# Patient Record
Sex: MALE | Race: WHITE | Employment: OTHER | URBAN - METROPOLITAN AREA
[De-identification: names, ages, dates, MRNs, and addresses within clinical notes are randomized per-mention and may not be internally consistent; named-entity substitution may affect disease eponyms.]

---

## 2020-02-28 RX ORDER — GABAPENTIN 300 MG/1
900 CAPSULE ORAL 2 TIMES DAILY
COMMUNITY
Start: 2019-12-18 | End: 2020-05-07

## 2020-02-28 RX ORDER — GABAPENTIN 600 MG/1
600 TABLET ORAL 2 TIMES DAILY
COMMUNITY

## 2020-02-28 RX ORDER — GLIPIZIDE 5 MG/1
5 TABLET ORAL DAILY
COMMUNITY
Start: 2019-09-25

## 2020-02-28 RX ORDER — ENALAPRIL MALEATE 10 MG/1
10 TABLET ORAL DAILY
COMMUNITY
Start: 2019-09-25

## 2020-02-28 RX ORDER — ROSUVASTATIN CALCIUM 10 MG/1
10 TABLET, COATED ORAL DAILY
COMMUNITY
Start: 2019-09-25

## 2020-02-28 RX ORDER — AMOXICILLIN AND CLAVULANATE POTASSIUM 875; 125 MG/1; MG/1
1 TABLET, FILM COATED ORAL 2 TIMES DAILY
COMMUNITY
Start: 2020-02-12

## 2020-02-28 NOTE — PROGRESS NOTES
Patient instructed to:  Bring picture ID, insurance card,proof of address  Dress in comfortable,loose clothing,no jewelry, no make up/or finger polish/nocontact lenses dos if appplicable  ALL Angelita Apo on operative limb must be removed prior to DOS -if applicable  Nothing to eat or drink after midnight the night before surgery   If instructed to take medicines day of surgery by PCP, take only with sips of water  If you are taking insulin or diabetic medications check with your PCP to adjust doses according to your NPO (not eating) status  Arrange for transportation to and from surgery  With a caregiver staying with you for 24 hours  --make sure you are bring appropriate clothes to fit over large operative drsg - if applicable  Bring copies of H&P and or ekg dos     If your are taking NSAIDS/ASA products/vitamins regularly confirm this with your surgeon regarding taking them preop 5 days before surgery     Notify your Surgeon if you develop any illness between now and surgery time; cough, cold, fever, sore throat, nausea, vomiting, etc.

## 2020-02-28 NOTE — PROGRESS NOTES
Reyna Nicci    Age 59 y.o.    male    1955    [facility] [id number]    3/3/2020  Arrival Time_____________  OR Time____________[address]     Procedure(s):  AMPUTATION OF RIGHT GREAT TOE                      Monitor Anesthesia Care    Surgeon(s):  Keenan Laurent, DPM       Phone [phone] (home) [phone] (work)    [address]  Cell Work  _________________________________________________________________  _________________________________________________________________  _________________________________________________________________  _________________________________________________________________  _________________________________________________________________      PCP _____________________________ Phone_________________       H&P__________________Bringing    Chart            Epic  DOS     Called_______  EKG__________________Bringing    Chart            Epic  DOS     Called_______  LAB__________________ Bringing    Chart            Epic  DOS     Called_______  Cardiac Clearance_______Bringing    Chart            Epic      DOS       Called_______    Cardiologist________________________ Phone___________________________      ? Catholic concerns / Waiver on Chart            PAT Communications_____________  ? Pre-op Instructions Given [place]          ______________________________  ? Directions to Surgery Center                          ______________________________  ? Transportation Home_______________      _______________________________  ?  Crutches/Walker__________________        _______________________________      ________Pre-op Orders   _______Transcribed    _______Wt.  ________Pharmacy          _______SCD  ______VTE     ______Beta Blocker  ________Consent             ________TED Louanna Robert

## 2020-03-03 ENCOUNTER — ANESTHESIA EVENT (OUTPATIENT)
Dept: OPERATING ROOM | Age: 65
End: 2020-03-03
Payer: COMMERCIAL

## 2020-03-03 ENCOUNTER — HOSPITAL ENCOUNTER (OUTPATIENT)
Age: 65
Setting detail: OUTPATIENT SURGERY
Discharge: HOME | End: 2020-03-03
Attending: PODIATRIST | Admitting: PODIATRIST
Payer: COMMERCIAL

## 2020-03-03 ENCOUNTER — ANESTHESIA (OUTPATIENT)
Dept: OPERATING ROOM | Age: 65
End: 2020-03-03
Payer: COMMERCIAL

## 2020-03-03 VITALS
HEIGHT: 70 IN | WEIGHT: 209 LBS | OXYGEN SATURATION: 97 % | BODY MASS INDEX: 29.92 KG/M2 | HEART RATE: 60 BPM | DIASTOLIC BLOOD PRESSURE: 71 MMHG | SYSTOLIC BLOOD PRESSURE: 104 MMHG | TEMPERATURE: 97 F | RESPIRATION RATE: 15 BRPM

## 2020-03-03 VITALS — TEMPERATURE: 98.6 F | SYSTOLIC BLOOD PRESSURE: 92 MMHG | OXYGEN SATURATION: 96 % | DIASTOLIC BLOOD PRESSURE: 67 MMHG

## 2020-03-03 LAB
GLUCOSE BLD-MCNC: 164 MG/DL (ref 70–99)
GLUCOSE BLD-MCNC: 184 MG/DL (ref 70–99)
PERFORMED ON: ABNORMAL
PERFORMED ON: ABNORMAL

## 2020-03-03 PROCEDURE — 7100000011 HC PHASE II RECOVERY - ADDTL 15 MIN: Performed by: PODIATRIST

## 2020-03-03 PROCEDURE — 2580000003 HC RX 258: Performed by: ANESTHESIOLOGY

## 2020-03-03 PROCEDURE — 2500000003 HC RX 250 WO HCPCS: Performed by: PODIATRIST

## 2020-03-03 PROCEDURE — 3700000000 HC ANESTHESIA ATTENDED CARE: Performed by: PODIATRIST

## 2020-03-03 PROCEDURE — 2709999900 HC NON-CHARGEABLE SUPPLY: Performed by: PODIATRIST

## 2020-03-03 PROCEDURE — 6360000002 HC RX W HCPCS: Performed by: NURSE ANESTHETIST, CERTIFIED REGISTERED

## 2020-03-03 PROCEDURE — 3700000001 HC ADD 15 MINUTES (ANESTHESIA): Performed by: PODIATRIST

## 2020-03-03 PROCEDURE — 3600000003 HC SURGERY LEVEL 3 BASE: Performed by: PODIATRIST

## 2020-03-03 PROCEDURE — 3600000013 HC SURGERY LEVEL 3 ADDTL 15MIN: Performed by: PODIATRIST

## 2020-03-03 PROCEDURE — 7100000010 HC PHASE II RECOVERY - FIRST 15 MIN: Performed by: PODIATRIST

## 2020-03-03 PROCEDURE — 88311 DECALCIFY TISSUE: CPT

## 2020-03-03 PROCEDURE — 88305 TISSUE EXAM BY PATHOLOGIST: CPT

## 2020-03-03 PROCEDURE — 6360000002 HC RX W HCPCS: Performed by: PODIATRIST

## 2020-03-03 PROCEDURE — 2500000003 HC RX 250 WO HCPCS: Performed by: NURSE ANESTHETIST, CERTIFIED REGISTERED

## 2020-03-03 RX ORDER — ONDANSETRON 2 MG/ML
4 INJECTION INTRAMUSCULAR; INTRAVENOUS
Status: DISCONTINUED | OUTPATIENT
Start: 2020-03-03 | End: 2020-03-03 | Stop reason: HOSPADM

## 2020-03-03 RX ORDER — HYDRALAZINE HYDROCHLORIDE 20 MG/ML
5 INJECTION INTRAMUSCULAR; INTRAVENOUS EVERY 10 MIN PRN
Status: DISCONTINUED | OUTPATIENT
Start: 2020-03-03 | End: 2020-03-03 | Stop reason: HOSPADM

## 2020-03-03 RX ORDER — SODIUM CHLORIDE, SODIUM LACTATE, POTASSIUM CHLORIDE, CALCIUM CHLORIDE 600; 310; 30; 20 MG/100ML; MG/100ML; MG/100ML; MG/100ML
INJECTION, SOLUTION INTRAVENOUS CONTINUOUS
Status: DISCONTINUED | OUTPATIENT
Start: 2020-03-03 | End: 2020-03-03 | Stop reason: HOSPADM

## 2020-03-03 RX ORDER — LIDOCAINE HYDROCHLORIDE 20 MG/ML
INJECTION, SOLUTION INFILTRATION; PERINEURAL PRN
Status: DISCONTINUED | OUTPATIENT
Start: 2020-03-03 | End: 2020-03-03 | Stop reason: SDUPTHER

## 2020-03-03 RX ORDER — MORPHINE SULFATE 2 MG/ML
2 INJECTION, SOLUTION INTRAMUSCULAR; INTRAVENOUS EVERY 5 MIN PRN
Status: DISCONTINUED | OUTPATIENT
Start: 2020-03-03 | End: 2020-03-03 | Stop reason: HOSPADM

## 2020-03-03 RX ORDER — MEPERIDINE HYDROCHLORIDE 50 MG/ML
12.5 INJECTION INTRAMUSCULAR; INTRAVENOUS; SUBCUTANEOUS EVERY 5 MIN PRN
Status: DISCONTINUED | OUTPATIENT
Start: 2020-03-03 | End: 2020-03-03 | Stop reason: HOSPADM

## 2020-03-03 RX ORDER — PHENYLEPHRINE HCL IN 0.9% NACL 1 MG/10 ML
SYRINGE (ML) INTRAVENOUS PRN
Status: DISCONTINUED | OUTPATIENT
Start: 2020-03-03 | End: 2020-03-03 | Stop reason: SDUPTHER

## 2020-03-03 RX ORDER — SODIUM CHLORIDE 0.9 % (FLUSH) 0.9 %
10 SYRINGE (ML) INJECTION PRN
Status: DISCONTINUED | OUTPATIENT
Start: 2020-03-03 | End: 2020-03-03 | Stop reason: HOSPADM

## 2020-03-03 RX ORDER — PROMETHAZINE HYDROCHLORIDE 25 MG/ML
6.25 INJECTION, SOLUTION INTRAMUSCULAR; INTRAVENOUS
Status: DISCONTINUED | OUTPATIENT
Start: 2020-03-03 | End: 2020-03-03 | Stop reason: HOSPADM

## 2020-03-03 RX ORDER — MIDAZOLAM HYDROCHLORIDE 1 MG/ML
INJECTION INTRAMUSCULAR; INTRAVENOUS PRN
Status: DISCONTINUED | OUTPATIENT
Start: 2020-03-03 | End: 2020-03-03 | Stop reason: SDUPTHER

## 2020-03-03 RX ORDER — OXYCODONE HYDROCHLORIDE AND ACETAMINOPHEN 5; 325 MG/1; MG/1
1 TABLET ORAL EVERY 6 HOURS PRN
Qty: 20 TABLET | Refills: 0 | Status: SHIPPED | OUTPATIENT
Start: 2020-03-03 | End: 2020-03-08

## 2020-03-03 RX ORDER — DIPHENHYDRAMINE HYDROCHLORIDE 50 MG/ML
12.5 INJECTION INTRAMUSCULAR; INTRAVENOUS
Status: DISCONTINUED | OUTPATIENT
Start: 2020-03-03 | End: 2020-03-03 | Stop reason: HOSPADM

## 2020-03-03 RX ORDER — FENTANYL CITRATE 50 UG/ML
INJECTION, SOLUTION INTRAMUSCULAR; INTRAVENOUS PRN
Status: DISCONTINUED | OUTPATIENT
Start: 2020-03-03 | End: 2020-03-03 | Stop reason: SDUPTHER

## 2020-03-03 RX ORDER — OXYCODONE HYDROCHLORIDE AND ACETAMINOPHEN 5; 325 MG/1; MG/1
2 TABLET ORAL PRN
Status: DISCONTINUED | OUTPATIENT
Start: 2020-03-03 | End: 2020-03-03 | Stop reason: HOSPADM

## 2020-03-03 RX ORDER — GLYCOPYRROLATE 1 MG/5 ML
SYRINGE (ML) INTRAVENOUS PRN
Status: DISCONTINUED | OUTPATIENT
Start: 2020-03-03 | End: 2020-03-03 | Stop reason: SDUPTHER

## 2020-03-03 RX ORDER — PROPOFOL 10 MG/ML
INJECTION, EMULSION INTRAVENOUS PRN
Status: DISCONTINUED | OUTPATIENT
Start: 2020-03-03 | End: 2020-03-03 | Stop reason: SDUPTHER

## 2020-03-03 RX ORDER — ONDANSETRON 2 MG/ML
INJECTION INTRAMUSCULAR; INTRAVENOUS PRN
Status: DISCONTINUED | OUTPATIENT
Start: 2020-03-03 | End: 2020-03-03 | Stop reason: SDUPTHER

## 2020-03-03 RX ORDER — MORPHINE SULFATE 2 MG/ML
1 INJECTION, SOLUTION INTRAMUSCULAR; INTRAVENOUS EVERY 5 MIN PRN
Status: DISCONTINUED | OUTPATIENT
Start: 2020-03-03 | End: 2020-03-03 | Stop reason: HOSPADM

## 2020-03-03 RX ORDER — OXYCODONE HYDROCHLORIDE AND ACETAMINOPHEN 5; 325 MG/1; MG/1
1 TABLET ORAL PRN
Status: DISCONTINUED | OUTPATIENT
Start: 2020-03-03 | End: 2020-03-03 | Stop reason: HOSPADM

## 2020-03-03 RX ORDER — SODIUM CHLORIDE 0.9 % (FLUSH) 0.9 %
10 SYRINGE (ML) INJECTION EVERY 12 HOURS SCHEDULED
Status: DISCONTINUED | OUTPATIENT
Start: 2020-03-03 | End: 2020-03-03 | Stop reason: HOSPADM

## 2020-03-03 RX ORDER — LIDOCAINE HYDROCHLORIDE 10 MG/ML
0.3 INJECTION, SOLUTION EPIDURAL; INFILTRATION; INTRACAUDAL; PERINEURAL
Status: DISCONTINUED | OUTPATIENT
Start: 2020-03-03 | End: 2020-03-03 | Stop reason: HOSPADM

## 2020-03-03 RX ORDER — LABETALOL HYDROCHLORIDE 5 MG/ML
5 INJECTION, SOLUTION INTRAVENOUS EVERY 10 MIN PRN
Status: DISCONTINUED | OUTPATIENT
Start: 2020-03-03 | End: 2020-03-03 | Stop reason: HOSPADM

## 2020-03-03 RX ADMIN — Medication 100 MCG: at 10:38

## 2020-03-03 RX ADMIN — FENTANYL CITRATE 50 MCG: 50 INJECTION INTRAMUSCULAR; INTRAVENOUS at 10:26

## 2020-03-03 RX ADMIN — SODIUM CHLORIDE, POTASSIUM CHLORIDE, SODIUM LACTATE AND CALCIUM CHLORIDE: 600; 310; 30; 20 INJECTION, SOLUTION INTRAVENOUS at 09:23

## 2020-03-03 RX ADMIN — Medication 100 MCG: at 10:43

## 2020-03-03 RX ADMIN — PROPOFOL 300 MG: 10 INJECTION, EMULSION INTRAVENOUS at 10:26

## 2020-03-03 RX ADMIN — FENTANYL CITRATE 25 MCG: 50 INJECTION INTRAMUSCULAR; INTRAVENOUS at 10:31

## 2020-03-03 RX ADMIN — LIDOCAINE HYDROCHLORIDE 3 ML: 20 INJECTION, SOLUTION INFILTRATION; PERINEURAL at 10:26

## 2020-03-03 RX ADMIN — FENTANYL CITRATE 50 MCG: 50 INJECTION INTRAMUSCULAR; INTRAVENOUS at 10:23

## 2020-03-03 RX ADMIN — ONDANSETRON 4 MG: 2 INJECTION INTRAMUSCULAR; INTRAVENOUS at 10:35

## 2020-03-03 RX ADMIN — MIDAZOLAM HYDROCHLORIDE 2 MG: 2 INJECTION, SOLUTION INTRAMUSCULAR; INTRAVENOUS at 10:21

## 2020-03-03 RX ADMIN — CEFAZOLIN SODIUM 2 G: 10 INJECTION, POWDER, FOR SOLUTION INTRAVENOUS at 10:23

## 2020-03-03 RX ADMIN — FENTANYL CITRATE 25 MCG: 50 INJECTION INTRAMUSCULAR; INTRAVENOUS at 10:32

## 2020-03-03 RX ADMIN — FENTANYL CITRATE 50 MCG: 50 INJECTION INTRAMUSCULAR; INTRAVENOUS at 10:33

## 2020-03-03 RX ADMIN — Medication 0.2 MG: at 10:48

## 2020-03-03 ASSESSMENT — PAIN SCALES - GENERAL
PAINLEVEL_OUTOF10: 0

## 2020-03-03 ASSESSMENT — PULMONARY FUNCTION TESTS
PIF_VALUE: 1
PIF_VALUE: 1
PIF_VALUE: 0
PIF_VALUE: 1
PIF_VALUE: 2
PIF_VALUE: 1

## 2020-03-03 ASSESSMENT — PAIN - FUNCTIONAL ASSESSMENT: PAIN_FUNCTIONAL_ASSESSMENT: 0-10

## 2020-03-03 NOTE — ANESTHESIA PRE PROCEDURE
 sodium chloride flush 0.9 % injection 10 mL  10 mL Intravenous PRN Tadeo Pereira MD        lidocaine PF 1 % injection 0.3 mL  0.3 mL Intradermal Once PRN Tadeo Pereira MD           Allergies:  No Known Allergies    Problem List:  There is no problem list on file for this patient. Past Medical History:        Diagnosis Date    Diabetes mellitus ([address]     Hyperlipidemia     Hypertension        Past Surgical History:        Procedure Laterality Date    APPENDECTOMY      COLONOSCOPY      EYE SURGERY Left     lasik    FOOT SURGERY Right 2017    big toe       Social History:    Social History     Tobacco Use    Smoking status: Never Smoker    Smokeless tobacco: Never Used   Substance Use Topics    Alcohol use: Yes     Alcohol/week: 14.0 standard drinks     Types: 14 Glasses of wine per week     Comment: 2/day                                Counseling given: Not Answered      Vital Signs (Current):   Vitals:    02/28/20 1434 03/03/20 0900   BP:  (!) 106/58   Pulse:  84   Resp:  16   Temp:  98.4 °F (36.9 °C)   TempSrc:  Temporal   SpO2:  97%   Weight: 209 lb (94.8 kg) 209 lb (94.8 kg)   Height: 5' 10\" (1.778 m) 5' 10\" (1.778 m)                                              BP Readings from Last 3 Encounters:   03/03/20 (!) 106/58       NPO Status: Time of last liquid consumption: 2200                        Time of last solid consumption: 2200                        Date of last liquid consumption: 03/02/20                        Date of last solid food consumption: 03/02/20    BMI:   Wt Readings from Last 3 Encounters:   03/03/20 209 lb (94.8 kg)     Body mass index is 29.99 kg/m².     CBC: No results found for: WBC, RBC, HGB, HCT, MCV, RDW, PLT    CMP: No results found for: NA, K, CL, CO2, BUN, CREATININE, GFRAA, AGRATIO, LABGLOM, GLUCOSE, PROT, CALCIUM, BILITOT, ALKPHOS, AST, ALT    POC Tests:   Recent Labs     03/03/20  0916   POCGLU 164*       Coags: No results found for: PROTIME, INR, APTT    HCG

## 2020-03-03 NOTE — ANESTHESIA POSTPROCEDURE EVALUATION
Department of Anesthesiology  Postprocedure Note    Patient: Edmar Rojas  MRN: [id number]  YOB: 1955  Date of evaluation: 3/3/2020  Time:  11:46 AM     Procedure Summary     Date:  03/03/20 Room / Location:  [facility]  / [facility]    Anesthesia Start:  0108 Anesthesia Stop:  5531    Procedure:  AMPUTATION OF RIGHT GREAT TOE (Right Foot) Diagnosis:       Chronic ulcer of right great toe, with necrosis of muscle ([address]      (Chronic ulcer of right great toe, with necrosis of muscle ([address] [A77.326])    Surgeon:  Sanjana Townsend DPM Responsible Provider:  Shravan De La Rosa MD    Anesthesia Type:  MAC ASA Status:  3          Anesthesia Type: No value filed. Burke Phase I: Burke Score: 10    Burke Phase II: Burke Score: 10    Last vitals: Reviewed and per EMR flowsheets. Anesthesia Post Evaluation    Patient location during evaluation: PACU  Patient participation: complete - patient participated  Level of consciousness: awake and alert  Airway patency: patent  Nausea & Vomiting: no nausea and no vomiting  Complications: no  Cardiovascular status: blood pressure returned to baseline  Respiratory status: acceptable  Hydration status: euvolemic  Comments: VSS on transfer to phase 2 recovery. No anesthetic complications.

## 2020-03-03 NOTE — PROGRESS NOTES
Wife to bedside. Discharge instructions reviewed with patient and responsible adult. Discharge instructions signed and copy given with no additional questions. Patient to be discharged home with belongings. Percocet script given.

## 2020-03-03 NOTE — OP NOTE
[address]                                OPERATIVE REPORT    PATIENT NAME: Candi Chowdhury                 :        1955  MED REC NO:   [id number]                          ROOM:  ACCOUNT NO:   [de-identified]                           ADMIT DATE: 2020  PROVIDER:     Warren López DPM    DATE OF PROCEDURE:  2020    PREOPERATIVE DIAGNOSIS:  Osteomyelitis of the right hallux. POSTOPERATIVE DIAGNOSIS:  Osteomyelitis of the right hallux. PROCEDURE:  Amputation, right hallux. INDICATIONS FOR THE SURGERY:  The patient has been bothered by an ulcer  that was infected with Strep and it has improved with antibiotics, but  the bone was infected in the distal phalanx by plain films. Therefore,  he elected to proceed with surgical intervention. Est Blood Loss: minimal under 5cc    OPERATIVE FINDINGS:  The patient was brought into the operating room and  placed on the operating table in the supine position. Anesthesia was  accomplished by means of posterior tibial and local infiltration  utilizing 10 mL of 1:1 mixture of 0.5% Marcaine plain and 1% lidocaine  plain. The right foot was then prepped and draped in the usual sterile  manner. Foot was elevated and exsanguinated with an Esmarch. Ankle  tourniquet was inflated to 250 mmHg. Attention was directed to the  hallux, where there was a plantar ulceration. A fishmouth incision was  made plantarly proximal to that ulceration and then tapered down  dorsally. The toe was disarticulated at the head of the proximal  phalanx and the infected part of the toe was removed in toto. The head  of the proximal phalanx looked to be solid without any infectious  process in it, but it was cut back, because there was no _____ skin to  close the area approximately 1/3 of the length utilizing an oscillating  equipment.     The flexor and extensor tendons were

## 2020-03-03 NOTE — PROGRESS NOTES
POCT      Blood Glucose: 184    (Normal Range 70-99)  Patient will resume home medication at home. No complaints.

## 2020-04-02 NOTE — BRIEF OP NOTE
Brief Postoperative Note      Patient: Isaac Hernandez  YOB: 1955  MRN: [id number]    Date of Procedure: 3/3/2020    Pre-Op Diagnosis: Chronic ulcer of right great toe, with necrosis of muscle ([facility] [L97.513]    Post-Op Diagnosis: Same       Procedure(s):  AMPUTATION OF RIGHT GREAT TOE    Surgeon(s):  Katina Drake DPM    Assistant:  Surgical Assistant: Juan Prasad    Anesthesia: Monitor Anesthesia Care    Estimated Blood Loss (mL): less than 50     Complications: None    Specimens:   ID Type Source Tests Collected by Time Destination   A : right great toe Tissue Tissue SURGICAL PATHOLOGY Katina Drake DPM 3/3/2020 1042        Implants:  * No implants in log *      Drains: * No LDAs found *    Findings: consistent with dx    Electronically signed by Ktaina Drake DPM on 4/2/2020 at 9:26 AM

## 2022-08-11 LAB
BASOPHILS ABSOLUTE: 0 /ΜL
BASOPHILS RELATIVE PERCENT: 0.4 %
EOSINOPHILS ABSOLUTE: 0.1 /ΜL
EOSINOPHILS RELATIVE PERCENT: 1.3 %
HCT VFR BLD CALC: 40.9 % (ref 41–53)
HEMOGLOBIN: 13.6 G/DL (ref 13.5–17.5)
LYMPHOCYTES ABSOLUTE: 2.1 /ΜL
LYMPHOCYTES RELATIVE PERCENT: 24.3 %
MCH RBC QN AUTO: 31.1 PG
MCHC RBC AUTO-ENTMCNC: 33.3 G/DL
MCV RBC AUTO: 93.6 FL
MONOCYTES ABSOLUTE: 0.7 /ΜL
MONOCYTES RELATIVE PERCENT: 7.9 %
NEUTROPHILS ABSOLUTE: 5.6 /ΜL
NEUTROPHILS RELATIVE PERCENT: 65.9 %
PDW BLD-RTO: 12.8 %
PLATELET # BLD: 333 K/ΜL
PMV BLD AUTO: 10 FL
RBC # BLD: 4.37 10^6/ΜL
WBC # BLD: 8.5 10^3/ML

## 2022-08-23 ENCOUNTER — HOSPITAL ENCOUNTER (OUTPATIENT)
Dept: VASCULAR LAB | Age: 67
Discharge: HOME | End: 2022-08-23
Payer: MEDICARE

## 2022-08-23 DIAGNOSIS — E11.51 TYPE II DIABETES MELLITUS WITH PERIPHERAL CIRCULATORY DISORDER (HCC): ICD-10-CM

## 2022-08-23 PROCEDURE — 93925 LOWER EXTREMITY STUDY: CPT

## 2022-09-21 ENCOUNTER — HOSPITAL ENCOUNTER (OUTPATIENT)
Dept: MRI IMAGING | Age: 67
Discharge: HOME | End: 2022-09-21
Payer: MEDICARE

## 2022-09-21 DIAGNOSIS — L97.512 NON-PRESSURE CHRONIC ULCER OF OTHER PART OF RIGHT FOOT WITH FAT LAYER EXPOSED (HCC): ICD-10-CM

## 2022-09-21 PROCEDURE — 73718 MRI LOWER EXTREMITY W/O DYE: CPT

## 2023-02-02 LAB
CREATININE, URINE: 200
MICROALBUMIN/CREAT 24H UR: 30 MG/G{CREAT}
MICROALBUMIN/CREAT UR-RTO: <30

## 2023-04-04 LAB
ALBUMIN SERPL-MCNC: 4.1 G/DL
ALP BLD-CCNC: 126 U/L
ALT SERPL-CCNC: 7 U/L
ANION GAP SERPL CALCULATED.3IONS-SCNC: NORMAL MMOL/L
AST SERPL-CCNC: 11 U/L
BILIRUB SERPL-MCNC: 0.7 MG/DL (ref 0.1–1.4)
BUN BLDV-MCNC: 15 MG/DL
CALCIUM SERPL-MCNC: 9.4 MG/DL
CHLORIDE BLD-SCNC: 103 MMOL/L
CHOLESTEROL, TOTAL: 103 MG/DL
CHOLESTEROL/HDL RATIO: ABNORMAL
CO2: 21 MMOL/L
CREAT SERPL-MCNC: 0.99 MG/DL
EGFR: 83
GLUCOSE BLD-MCNC: 137 MG/DL
HDLC SERPL-MCNC: 34 MG/DL (ref 35–70)
LDL CHOLESTEROL CALCULATED: 49 MG/DL (ref 0–160)
NONHDLC SERPL-MCNC: ABNORMAL MG/DL
POTASSIUM SERPL-SCNC: 4.3 MMOL/L
SODIUM BLD-SCNC: 141 MMOL/L
TOTAL PROTEIN: 6.8
TRIGL SERPL-MCNC: 107 MG/DL
VLDLC SERPL CALC-MCNC: 20 MG/DL

## 2023-05-01 ENCOUNTER — OFFICE VISIT (OUTPATIENT)
Dept: INTERNAL MEDICINE CLINIC | Age: 68
End: 2023-05-01
Payer: MEDICARE

## 2023-05-01 VITALS
DIASTOLIC BLOOD PRESSURE: 74 MMHG | BODY MASS INDEX: 31.55 KG/M2 | SYSTOLIC BLOOD PRESSURE: 116 MMHG | OXYGEN SATURATION: 99 % | HEIGHT: 69 IN | HEART RATE: 90 BPM | WEIGHT: 213 LBS

## 2023-05-01 DIAGNOSIS — Z12.11 SCREEN FOR COLON CANCER: ICD-10-CM

## 2023-05-01 DIAGNOSIS — Z00.00 INITIAL MEDICARE ANNUAL WELLNESS VISIT: Primary | ICD-10-CM

## 2023-05-01 DIAGNOSIS — E11.43 TYPE 2 DIABETES MELLITUS WITH DIABETIC AUTONOMIC NEUROPATHY, WITHOUT LONG-TERM CURRENT USE OF INSULIN (HCC): ICD-10-CM

## 2023-05-01 DIAGNOSIS — E11.69 HYPERLIPIDEMIA ASSOCIATED WITH TYPE 2 DIABETES MELLITUS (HCC): ICD-10-CM

## 2023-05-01 DIAGNOSIS — S90.414S: ICD-10-CM

## 2023-05-01 DIAGNOSIS — E78.5 HYPERLIPIDEMIA ASSOCIATED WITH TYPE 2 DIABETES MELLITUS (HCC): ICD-10-CM

## 2023-05-01 DIAGNOSIS — G62.9 NEUROPATHY: ICD-10-CM

## 2023-05-01 DIAGNOSIS — I10 PRIMARY HYPERTENSION: ICD-10-CM

## 2023-05-01 DIAGNOSIS — L08.9: ICD-10-CM

## 2023-05-01 LAB — HBA1C MFR BLD: 7.6 %

## 2023-05-01 PROCEDURE — G8427 DOCREV CUR MEDS BY ELIG CLIN: HCPCS | Performed by: INTERNAL MEDICINE

## 2023-05-01 PROCEDURE — 3074F SYST BP LT 130 MM HG: CPT | Performed by: INTERNAL MEDICINE

## 2023-05-01 PROCEDURE — 3017F COLORECTAL CA SCREEN DOC REV: CPT | Performed by: INTERNAL MEDICINE

## 2023-05-01 PROCEDURE — 2022F DILAT RTA XM EVC RTNOPTHY: CPT | Performed by: INTERNAL MEDICINE

## 2023-05-01 PROCEDURE — G0438 PPPS, INITIAL VISIT: HCPCS | Performed by: INTERNAL MEDICINE

## 2023-05-01 PROCEDURE — G8417 CALC BMI ABV UP PARAM F/U: HCPCS | Performed by: INTERNAL MEDICINE

## 2023-05-01 PROCEDURE — 3078F DIAST BP <80 MM HG: CPT | Performed by: INTERNAL MEDICINE

## 2023-05-01 PROCEDURE — 83036 HEMOGLOBIN GLYCOSYLATED A1C: CPT | Performed by: INTERNAL MEDICINE

## 2023-05-01 PROCEDURE — 1036F TOBACCO NON-USER: CPT | Performed by: INTERNAL MEDICINE

## 2023-05-01 PROCEDURE — 1123F ACP DISCUSS/DSCN MKR DOCD: CPT | Performed by: INTERNAL MEDICINE

## 2023-05-01 PROCEDURE — 99204 OFFICE O/P NEW MOD 45 MIN: CPT | Performed by: INTERNAL MEDICINE

## 2023-05-01 PROCEDURE — 3051F HG A1C>EQUAL 7.0%<8.0%: CPT | Performed by: INTERNAL MEDICINE

## 2023-05-01 RX ORDER — ASPIRIN 81 MG/1
81 TABLET ORAL DAILY
Qty: 1 TABLET | Refills: 0 | COMMUNITY
Start: 2023-05-01

## 2023-05-01 RX ORDER — ENALAPRIL MALEATE 10 MG/1
10 TABLET ORAL 2 TIMES DAILY
Qty: 180 TABLET | Refills: 3 | Status: SHIPPED | OUTPATIENT
Start: 2023-05-01

## 2023-05-01 RX ORDER — GLIPIZIDE 10 MG/1
10 TABLET ORAL 2 TIMES DAILY
Qty: 180 TABLET | Refills: 3 | Status: SHIPPED | OUTPATIENT
Start: 2023-05-01

## 2023-05-01 RX ORDER — GABAPENTIN 800 MG/1
800 TABLET ORAL DAILY
Qty: 180 TABLET | Refills: 1 | Status: SHIPPED | OUTPATIENT
Start: 2023-05-01 | End: 2023-10-28

## 2023-05-01 RX ORDER — ROSUVASTATIN CALCIUM 10 MG/1
10 TABLET, COATED ORAL DAILY
Qty: 90 TABLET | Refills: 3 | Status: SHIPPED | OUTPATIENT
Start: 2023-05-01

## 2023-05-01 SDOH — ECONOMIC STABILITY: INCOME INSECURITY: HOW HARD IS IT FOR YOU TO PAY FOR THE VERY BASICS LIKE FOOD, HOUSING, MEDICAL CARE, AND HEATING?: NOT HARD AT ALL

## 2023-05-01 SDOH — ECONOMIC STABILITY: HOUSING INSECURITY
IN THE LAST 12 MONTHS, WAS THERE A TIME WHEN YOU DID NOT HAVE A STEADY PLACE TO SLEEP OR SLEPT IN A SHELTER (INCLUDING NOW)?: NO

## 2023-05-01 SDOH — ECONOMIC STABILITY: FOOD INSECURITY: WITHIN THE PAST 12 MONTHS, YOU WORRIED THAT YOUR FOOD WOULD RUN OUT BEFORE YOU GOT MONEY TO BUY MORE.: NEVER TRUE

## 2023-05-01 SDOH — ECONOMIC STABILITY: FOOD INSECURITY: WITHIN THE PAST 12 MONTHS, THE FOOD YOU BOUGHT JUST DIDN'T LAST AND YOU DIDN'T HAVE MONEY TO GET MORE.: NEVER TRUE

## 2023-05-01 ASSESSMENT — PATIENT HEALTH QUESTIONNAIRE - PHQ9
9. THOUGHTS THAT YOU WOULD BE BETTER OFF DEAD, OR OF HURTING YOURSELF: 0
SUM OF ALL RESPONSES TO PHQ9 QUESTIONS 1 & 2: 0
SUM OF ALL RESPONSES TO PHQ QUESTIONS 1-9: 0
4. FEELING TIRED OR HAVING LITTLE ENERGY: 0
SUM OF ALL RESPONSES TO PHQ QUESTIONS 1-9: 0
6. FEELING BAD ABOUT YOURSELF - OR THAT YOU ARE A FAILURE OR HAVE LET YOURSELF OR YOUR FAMILY DOWN: 0
SUM OF ALL RESPONSES TO PHQ QUESTIONS 1-9: 0
SUM OF ALL RESPONSES TO PHQ QUESTIONS 1-9: 0
8. MOVING OR SPEAKING SO SLOWLY THAT OTHER PEOPLE COULD HAVE NOTICED. OR THE OPPOSITE, BEING SO FIGETY OR RESTLESS THAT YOU HAVE BEEN MOVING AROUND A LOT MORE THAN USUAL: 0
2. FEELING DOWN, DEPRESSED OR HOPELESS: 0
1. LITTLE INTEREST OR PLEASURE IN DOING THINGS: 0
3. TROUBLE FALLING OR STAYING ASLEEP: 0
7. TROUBLE CONCENTRATING ON THINGS, SUCH AS READING THE NEWSPAPER OR WATCHING TELEVISION: 0
5. POOR APPETITE OR OVEREATING: 0

## 2023-05-01 ASSESSMENT — LIFESTYLE VARIABLES
HOW MANY STANDARD DRINKS CONTAINING ALCOHOL DO YOU HAVE ON A TYPICAL DAY: 1 OR 2
HOW OFTEN DO YOU HAVE A DRINK CONTAINING ALCOHOL: MONTHLY OR LESS

## 2023-05-01 NOTE — PATIENT INSTRUCTIONS
is done to find the right prescription for glasses and contact lenses. A color vision test is done to check for color blindness. Color vision is often tested as part of a routine exam. You may also have this test when you apply for a job where recognizing different colors is important, such as , electronics, or the [employer]. How are vision tests done? Visual acuity test   You cover one eye at a time. You read aloud from a wall chart across the room. You read aloud from a small card that you hold in your hand. Refraction   You look into a special device. The device puts lenses of different strengths in front of each eye to see how strong your glasses or contact lenses need to be. Visual field tests   Your doctor may have you look through special machines. Or your doctor may simply have you stare straight ahead while they move a finger into and out of your field of vision. Color vision test   You look at pieces of printed test patterns in various colors. You say what number or symbol you see. Your doctor may have you trace the number or symbol using a pointer. How do these tests feel? There is very little chance of having a problem from this test. If dilating drops are used for a vision test, they may make the eyes sting and cause a medicine taste in the mouth. Follow-up care is a key part of your treatment and safety. Be sure to make and go to all appointments, and call your doctor if you are having problems. It's also a good idea to know your test results and keep a list of the medicines you take. Where can you learn more? Go to http://www.ruiz.com/ and enter G551 to learn more about \"Learning About Vision Tests. \"  Current as of: October 12, 2022               Content Version: 13.6  © 0195-2889 Healthwise, Incorporated. Care instructions adapted under license by [facility] ([facility]).  If you have questions about a medical condition or this instruction, always ask your

## 2023-05-01 NOTE — PROGRESS NOTES
Yajaira Baca  YOB: 1955    Date of Service:  5/1/2023    Chief Complaint:      Chief Complaint   Patient presents with    New Patient    Medicare AWV    Diabetes    Hypertension    Cholesterol Problem    Medication Refill       Assessment/Plan:  Marianne Zepeda was seen today for new patient, medicare awv, diabetes, hypertension, cholesterol problem and medication refill. Diagnoses and all orders for this visit:    Initial Medicare annual wellness visit    Screen for colon cancer  -     AFL - Mily Wade MD, Gastroenterology (ERCP & EUS), [facility]    Type 2 diabetes mellitus with diabetic autonomic neuropathy, without long-term current use of insulin ([facility]  -     POCT glycosylated hemoglobin (Hb A1C)  Change to glipiZIDE (GLUCOTROL) 10 MG tablet; Take 1 tablet by mouth 2 times daily  -     metFORMIN (GLUCOPHAGE) 500 MG tablet; Take 2 tablets by mouth 2 times daily (with meals)  -     Diabetic Foot Exam    Primary hypertension  -     enalapril (VASOTEC) 10 MG tablet; Take 1 tablet by mouth 2 times daily    Neuropathy  Change to gabapentin (NEURONTIN) 800 MG tablet; Take 1 tablet by mouth daily for 180 days. Hyperlipidemia associated with type 2 diabetes mellitus (HCC)  -     rosuvastatin (CRESTOR) 10 MG tablet; Take 1 tablet by mouth daily    Infected abrasion of toe of right foot, sequela  Follow up with podiatrist    Return Diabetes, right big toe, BP, cholesterol 11/1. HPI:  Yajaira Baca is a 79 y.o. He cut his right big toe in Nov, was on Augmentin in Feb and have seen a podiatrist with multiple tests that came back ok.     Treatment Adherence:   Medication compliance:  compliant most of the time  Diet compliance:  compliant most of the time  Weight trend: stable  Current exercise: walks 5 time(s) per week  Barriers: none    Diabetes Mellitus Type 2: Current symptoms/problems include Neuropathy on Neurontin 300 mg 3 bid    Home blood sugar records: fasting range: 180 AM

## 2023-05-01 NOTE — PROGRESS NOTES
Medicare Annual Wellness Visit    Cyndee Mario is here for New Patient and Medicare Suellen Jaquez   Initial Medicare annual wellness visit      Recommendations for Preventive Services Due: see orders and patient instructions/AVS.  Recommended screening schedule for the next 5-10 years is provided to the patient in written form: see Patient Instructions/AVS.     No follow-ups on file. Subjective       Patient's complete Health Risk Assessment and screening values have been reviewed and are found in Flowsheets. The following problems were reviewed today and where indicated follow up appointments were made and/or referrals ordered. Positive Risk Factor Screenings with Interventions:               General HRA Questions:  Select all that apply: (!) New or Increased Pain (soreness in ankles from working)    Pain Interventions:  See above       Weight and Activity:  Physical Activity: Sufficiently Active    Days of Exercise per Week: 4 days    Minutes of Exercise per Session: 40 min     On average, how many days per week do you engage in moderate to strenuous exercise (like a brisk walk)?: 4 days  Have you lost any weight without trying in the past 3 months?: No  Body mass index is 31.45 kg/m². (!) Abnormal  Obesity Interventions:  Patient comments: only pain after walking for 2 miles          Dentist Screen:  Have you seen the dentist within the past year?: (!) No (needs to schedule)    Intervention:  Advised to schedule with their dentist     Vision Screen:  Do you have difficulty driving, watching TV, or doing any of your daily activities because of your eyesight?: (!) Yes (left eye is foggy)  Have you had an eye exam within the past year?: (!) No (needs to schedule)  No results found.     Interventions:   Patient encouraged to make appointment with their eye specialist    Safety:  Do you have either shower bars, grab bars, non-slip mats or non-slip surfaces in your shower or bathtub?: (!) No (pt

## 2023-11-01 ENCOUNTER — OFFICE VISIT (OUTPATIENT)
Dept: INTERNAL MEDICINE CLINIC | Age: 68
End: 2023-11-01
Payer: MEDICARE

## 2023-11-01 VITALS
WEIGHT: 210 LBS | DIASTOLIC BLOOD PRESSURE: 74 MMHG | HEART RATE: 70 BPM | HEIGHT: 69 IN | OXYGEN SATURATION: 99 % | BODY MASS INDEX: 31.1 KG/M2 | SYSTOLIC BLOOD PRESSURE: 128 MMHG

## 2023-11-01 DIAGNOSIS — M15.8 OTHER OSTEOARTHRITIS INVOLVING MULTIPLE JOINTS: ICD-10-CM

## 2023-11-01 DIAGNOSIS — E78.5 HYPERLIPIDEMIA ASSOCIATED WITH TYPE 2 DIABETES MELLITUS (HCC): ICD-10-CM

## 2023-11-01 DIAGNOSIS — G62.9 NEUROPATHY: ICD-10-CM

## 2023-11-01 DIAGNOSIS — M15.9 PRIMARY OSTEOARTHRITIS INVOLVING MULTIPLE JOINTS: ICD-10-CM

## 2023-11-01 DIAGNOSIS — E11.69 HYPERLIPIDEMIA ASSOCIATED WITH TYPE 2 DIABETES MELLITUS (HCC): ICD-10-CM

## 2023-11-01 DIAGNOSIS — E11.43 TYPE 2 DIABETES MELLITUS WITH DIABETIC AUTONOMIC NEUROPATHY, WITHOUT LONG-TERM CURRENT USE OF INSULIN (HCC): Primary | ICD-10-CM

## 2023-11-01 DIAGNOSIS — M06.9 RHEUMATOID ARTHRITIS INVOLVING MULTIPLE JOINTS (HCC): ICD-10-CM

## 2023-11-01 DIAGNOSIS — I10 PRIMARY HYPERTENSION: ICD-10-CM

## 2023-11-01 LAB — HBA1C MFR BLD: 7 %

## 2023-11-01 PROCEDURE — G8417 CALC BMI ABV UP PARAM F/U: HCPCS | Performed by: INTERNAL MEDICINE

## 2023-11-01 PROCEDURE — G8427 DOCREV CUR MEDS BY ELIG CLIN: HCPCS | Performed by: INTERNAL MEDICINE

## 2023-11-01 PROCEDURE — 3051F HG A1C>EQUAL 7.0%<8.0%: CPT | Performed by: INTERNAL MEDICINE

## 2023-11-01 PROCEDURE — 99214 OFFICE O/P EST MOD 30 MIN: CPT | Performed by: INTERNAL MEDICINE

## 2023-11-01 PROCEDURE — 3078F DIAST BP <80 MM HG: CPT | Performed by: INTERNAL MEDICINE

## 2023-11-01 PROCEDURE — 1123F ACP DISCUSS/DSCN MKR DOCD: CPT | Performed by: INTERNAL MEDICINE

## 2023-11-01 PROCEDURE — 3074F SYST BP LT 130 MM HG: CPT | Performed by: INTERNAL MEDICINE

## 2023-11-01 PROCEDURE — G8484 FLU IMMUNIZE NO ADMIN: HCPCS | Performed by: INTERNAL MEDICINE

## 2023-11-01 PROCEDURE — 83036 HEMOGLOBIN GLYCOSYLATED A1C: CPT | Performed by: INTERNAL MEDICINE

## 2023-11-01 PROCEDURE — 1036F TOBACCO NON-USER: CPT | Performed by: INTERNAL MEDICINE

## 2023-11-01 PROCEDURE — 3017F COLORECTAL CA SCREEN DOC REV: CPT | Performed by: INTERNAL MEDICINE

## 2023-11-01 PROCEDURE — 2022F DILAT RTA XM EVC RTNOPTHY: CPT | Performed by: INTERNAL MEDICINE

## 2023-11-01 RX ORDER — DICLOFENAC SODIUM 75 MG/1
75 TABLET, DELAYED RELEASE ORAL 2 TIMES DAILY WITH MEALS
Qty: 60 TABLET | Refills: 2 | Status: SHIPPED | OUTPATIENT
Start: 2023-11-01

## 2023-11-01 RX ORDER — GABAPENTIN 800 MG/1
800 TABLET ORAL DAILY
Qty: 90 TABLET | Refills: 1 | Status: SHIPPED | OUTPATIENT
Start: 2023-11-01 | End: 2024-04-29

## 2023-11-01 NOTE — PROGRESS NOTES
Krystian Calvillo  YOB: 1955    Date of Service:  11/1/2023    Chief Complaint:      Chief Complaint   Patient presents with    Diabetes    Toe Pain    Hypertension    Hyperlipidemia    Joint Pain     Thinks he may have arthritis. Has pain in hands, wrists, elbows, and shoulders. Assessment/Plan:  Corey Lott was seen today for diabetes, toe pain, hypertension, hyperlipidemia and joint pain. Diagnoses and all orders for this visit:    Type 2 diabetes mellitus with diabetic autonomic neuropathy, without long-term current use of insulin (HCC)  -     POCT glycosylated hemoglobin (Hb A1C)    Neuropathy  -     gabapentin (NEURONTIN) 800 MG tablet; Take 1 tablet by mouth daily for 180 days. Hyperlipidemia associated with type 2 diabetes mellitus ([address])    Primary hypertension    Primary osteoarthritis involving multiple joints    Other osteoarthritis involving multiple joints  Start diclofenac (VOLTAREN) 75 MG EC tablet; Take 1 tablet by mouth 2 times daily (with meals)  -     SARAH Miguel MD, Rheumatology, [facility]    Rheumatoid arthritis involving multiple joints ([address])  -     SARAH Miguel MD, Rheumatology, [facility]        Return Fasting AWV and f/u 5/1, arrive by 9:15 AM.      HPI:  Krystian Calvillo is a 76 y.o. Right big toe plantar area lesion is still open. No pain or discharge. He has not follow up with the podiatrist.    He complains of arthritis pain in his hands, wrist, elbow and shoulders.   He was on methotrexate per Dr. Savannah Mckenzie in the past?    Treatment Adherence:   Medication compliance:  compliant most of the time  Diet compliance:  compliant most of the time  Weight trend: stable  Current exercise: walks 5 time(s) per week  Barriers: none     Diabetes Mellitus Type 2: Current symptoms/problems include Neuropathy on Neurontin 800 mg qhs     Home blood sugar records: fasting range: 140-150 AM and 120-170 PM  Glipizide 10 mg 2 twice a day and Metformin 500

## 2023-12-26 ENCOUNTER — APPOINTMENT (OUTPATIENT)
Dept: GENERAL RADIOLOGY | Age: 68
End: 2023-12-26
Payer: MEDICARE

## 2023-12-26 ENCOUNTER — HOSPITAL ENCOUNTER (INPATIENT)
Age: 68
LOS: 10 days | Discharge: HOME HEALTH | End: 2024-01-06
Attending: EMERGENCY MEDICINE | Admitting: STUDENT IN AN ORGANIZED HEALTH CARE EDUCATION/TRAINING PROGRAM
Payer: MEDICARE

## 2023-12-26 DIAGNOSIS — A41.9 SEPTIC SHOCK (HCC): Primary | ICD-10-CM

## 2023-12-26 DIAGNOSIS — R65.21 SEPTIC SHOCK (HCC): Primary | ICD-10-CM

## 2023-12-26 DIAGNOSIS — I42.8 OTHER CARDIOMYOPATHY (HCC): ICD-10-CM

## 2023-12-26 DIAGNOSIS — M86.9 OSTEOMYELITIS OF RIGHT FOOT, UNSPECIFIED TYPE (HCC): ICD-10-CM

## 2023-12-26 DIAGNOSIS — I48.0 PAF (PAROXYSMAL ATRIAL FIBRILLATION) (HCC): ICD-10-CM

## 2023-12-26 LAB
ALBUMIN SERPL-MCNC: 3.4 G/DL (ref 3.4–5)
ALBUMIN/GLOB SERPL: 0.8 {RATIO} (ref 1.1–2.2)
ALP SERPL-CCNC: 124 U/L (ref 40–129)
ALT SERPL-CCNC: 18 U/L (ref 10–40)
ANION GAP SERPL CALCULATED.3IONS-SCNC: 20 MMOL/L (ref 3–16)
AST SERPL-CCNC: 22 U/L (ref 15–37)
BASE EXCESS BLDV CALC-SCNC: -8.2 MMOL/L (ref -3–3)
BASOPHILS # BLD: 0 K/UL (ref 0–0.2)
BASOPHILS NFR BLD: 0.3 %
BILIRUB SERPL-MCNC: 0.8 MG/DL (ref 0–1)
BUN SERPL-MCNC: 37 MG/DL (ref 7–20)
CALCIUM SERPL-MCNC: 9.3 MG/DL (ref 8.3–10.6)
CHLORIDE SERPL-SCNC: 96 MMOL/L (ref 99–110)
CO2 BLDV-SCNC: 18 MMOL/L
CO2 SERPL-SCNC: 16 MMOL/L (ref 21–32)
COHGB MFR BLDV: 3.5 % (ref 0–1.5)
CREAT SERPL-MCNC: 2.2 MG/DL (ref 0.8–1.3)
DEPRECATED RDW RBC AUTO: 12.9 % (ref 12.4–15.4)
EOSINOPHIL # BLD: 0 K/UL (ref 0–0.6)
EOSINOPHIL NFR BLD: 1.1 %
ERYTHROCYTE [SEDIMENTATION RATE] IN BLOOD BY WESTERGREN METHOD: 73 MM/HR (ref 0–20)
GFR SERPLBLD CREATININE-BSD FMLA CKD-EPI: 32 ML/MIN/{1.73_M2}
GLUCOSE BLD-MCNC: 163 MG/DL (ref 70–99)
GLUCOSE SERPL-MCNC: 153 MG/DL (ref 70–99)
HCO3 BLDV-SCNC: 16.9 MMOL/L (ref 23–29)
HCT VFR BLD AUTO: 32.9 % (ref 40.5–52.5)
HGB BLD-MCNC: 11.2 G/DL (ref 13.5–17.5)
LACTATE BLDV-SCNC: 5.6 MMOL/L (ref 0.4–2)
LYMPHOCYTES # BLD: 0.6 K/UL (ref 1–5.1)
LYMPHOCYTES NFR BLD: 14.5 %
MCH RBC QN AUTO: 29.9 PG (ref 26–34)
MCHC RBC AUTO-ENTMCNC: 34 G/DL (ref 31–36)
MCV RBC AUTO: 87.8 FL (ref 80–100)
METHGB MFR BLDV: 0.1 %
MONOCYTES # BLD: 0 K/UL (ref 0–1.3)
MONOCYTES NFR BLD: 0.4 %
NEUTROPHILS # BLD: 3.2 K/UL (ref 1.7–7.7)
NEUTROPHILS NFR BLD: 83.7 %
O2 THERAPY: ABNORMAL
PCO2 BLDV: 33.3 MMHG (ref 40–50)
PERFORMED ON: ABNORMAL
PH BLDV: 7.32 [PH] (ref 7.35–7.45)
PLATELET # BLD AUTO: 310 K/UL (ref 135–450)
PMV BLD AUTO: 7.7 FL (ref 5–10.5)
PO2 BLDV: 39.5 MMHG (ref 25–40)
POTASSIUM SERPL-SCNC: 4.6 MMOL/L (ref 3.5–5.1)
PROT SERPL-MCNC: 7.6 G/DL (ref 6.4–8.2)
RBC # BLD AUTO: 3.75 M/UL (ref 4.2–5.9)
SAO2 % BLDV: 71 %
SODIUM SERPL-SCNC: 132 MMOL/L (ref 136–145)
TROPONIN, HIGH SENSITIVITY: 59 NG/L (ref 0–22)
WBC # BLD AUTO: 3.9 K/UL (ref 4–11)

## 2023-12-26 PROCEDURE — 6360000002 HC RX W HCPCS: Performed by: EMERGENCY MEDICINE

## 2023-12-26 PROCEDURE — 87150 DNA/RNA AMPLIFIED PROBE: CPT

## 2023-12-26 PROCEDURE — 87076 CULTURE ANAEROBE IDENT EACH: CPT

## 2023-12-26 PROCEDURE — 82077 ASSAY SPEC XCP UR&BREATH IA: CPT

## 2023-12-26 PROCEDURE — 87186 SC STD MICRODIL/AGAR DIL: CPT

## 2023-12-26 PROCEDURE — 2580000003 HC RX 258: Performed by: EMERGENCY MEDICINE

## 2023-12-26 PROCEDURE — 73630 X-RAY EXAM OF FOOT: CPT

## 2023-12-26 PROCEDURE — 83605 ASSAY OF LACTIC ACID: CPT

## 2023-12-26 PROCEDURE — 87077 CULTURE AEROBIC IDENTIFY: CPT

## 2023-12-26 PROCEDURE — 87636 SARSCOV2 & INF A&B AMP PRB: CPT

## 2023-12-26 PROCEDURE — 85652 RBC SED RATE AUTOMATED: CPT

## 2023-12-26 PROCEDURE — 85025 COMPLETE CBC W/AUTO DIFF WBC: CPT

## 2023-12-26 PROCEDURE — 71045 X-RAY EXAM CHEST 1 VIEW: CPT

## 2023-12-26 PROCEDURE — 87040 BLOOD CULTURE FOR BACTERIA: CPT

## 2023-12-26 PROCEDURE — 96365 THER/PROPH/DIAG IV INF INIT: CPT

## 2023-12-26 PROCEDURE — 2580000003 HC RX 258: Performed by: NURSE PRACTITIONER

## 2023-12-26 PROCEDURE — 84484 ASSAY OF TROPONIN QUANT: CPT

## 2023-12-26 PROCEDURE — 93005 ELECTROCARDIOGRAM TRACING: CPT | Performed by: NURSE PRACTITIONER

## 2023-12-26 PROCEDURE — 99285 EMERGENCY DEPT VISIT HI MDM: CPT

## 2023-12-26 PROCEDURE — 6370000000 HC RX 637 (ALT 250 FOR IP): Performed by: NURSE PRACTITIONER

## 2023-12-26 PROCEDURE — 80053 COMPREHEN METABOLIC PANEL: CPT

## 2023-12-26 PROCEDURE — 82803 BLOOD GASES ANY COMBINATION: CPT

## 2023-12-26 RX ORDER — 0.9 % SODIUM CHLORIDE 0.9 %
30 INTRAVENOUS SOLUTION INTRAVENOUS ONCE
Status: COMPLETED | OUTPATIENT
Start: 2023-12-26 | End: 2023-12-27

## 2023-12-26 RX ORDER — ASPIRIN 325 MG
325 TABLET ORAL DAILY
COMMUNITY

## 2023-12-26 RX ORDER — ACETAMINOPHEN 500 MG
1000 TABLET ORAL ONCE
Status: COMPLETED | OUTPATIENT
Start: 2023-12-26 | End: 2023-12-26

## 2023-12-26 RX ADMIN — ACETAMINOPHEN 1000 MG: 500 TABLET ORAL at 23:45

## 2023-12-26 RX ADMIN — SODIUM CHLORIDE 2586 ML: 9 INJECTION, SOLUTION INTRAVENOUS at 23:32

## 2023-12-26 RX ADMIN — CEFEPIME 2000 MG: 2 INJECTION, POWDER, FOR SOLUTION INTRAVENOUS at 23:36

## 2023-12-26 ASSESSMENT — PAIN - FUNCTIONAL ASSESSMENT: PAIN_FUNCTIONAL_ASSESSMENT: NONE - DENIES PAIN

## 2023-12-27 ENCOUNTER — APPOINTMENT (OUTPATIENT)
Dept: MRI IMAGING | Age: 68
End: 2023-12-27
Payer: MEDICARE

## 2023-12-27 ENCOUNTER — APPOINTMENT (OUTPATIENT)
Dept: GENERAL RADIOLOGY | Age: 68
End: 2023-12-27
Payer: MEDICARE

## 2023-12-27 PROBLEM — E11.42 TYPE 2 DIABETES MELLITUS WITH DIABETIC POLYNEUROPATHY (HCC): Status: ACTIVE | Noted: 2023-05-01

## 2023-12-27 PROBLEM — R65.21 SEPTIC SHOCK (HCC): Status: ACTIVE | Noted: 2023-12-27

## 2023-12-27 PROBLEM — A41.9 SEPTIC SHOCK (HCC): Status: ACTIVE | Noted: 2023-12-27

## 2023-12-27 PROBLEM — L97.511 ULCER OF RIGHT FOOT, LIMITED TO BREAKDOWN OF SKIN (HCC): Status: ACTIVE | Noted: 2023-12-27

## 2023-12-27 LAB
AMORPH SED URNS QL MICRO: NORMAL /HPF
ANION GAP SERPL CALCULATED.3IONS-SCNC: 16 MMOL/L (ref 3–16)
ANION GAP SERPL CALCULATED.3IONS-SCNC: 16 MMOL/L (ref 3–16)
BASE EXCESS BLDV CALC-SCNC: -10 MMOL/L (ref -3–3)
BASOPHILS # BLD: 0.1 K/UL (ref 0–0.2)
BASOPHILS NFR BLD: 0.3 %
BILIRUB UR QL STRIP.AUTO: ABNORMAL
BUN SERPL-MCNC: 38 MG/DL (ref 7–20)
BUN SERPL-MCNC: 41 MG/DL (ref 7–20)
CALCIUM SERPL-MCNC: 7.6 MG/DL (ref 8.3–10.6)
CALCIUM SERPL-MCNC: 8.1 MG/DL (ref 8.3–10.6)
CHLORIDE SERPL-SCNC: 101 MMOL/L (ref 99–110)
CHLORIDE SERPL-SCNC: 97 MMOL/L (ref 99–110)
CLARITY UR: ABNORMAL
CO2 BLDV-SCNC: 17 MMOL/L
CO2 SERPL-SCNC: 14 MMOL/L (ref 21–32)
CO2 SERPL-SCNC: 15 MMOL/L (ref 21–32)
COLOR UR: YELLOW
CREAT SERPL-MCNC: 2.5 MG/DL (ref 0.8–1.3)
CREAT SERPL-MCNC: 2.6 MG/DL (ref 0.8–1.3)
DEPRECATED RDW RBC AUTO: 12.8 % (ref 12.4–15.4)
EKG ATRIAL RATE: 100 BPM
EKG ATRIAL RATE: 160 BPM
EKG DIAGNOSIS: NORMAL
EKG DIAGNOSIS: NORMAL
EKG P AXIS: 34 DEGREES
EKG P-R INTERVAL: 134 MS
EKG P-R INTERVAL: 144 MS
EKG Q-T INTERVAL: 302 MS
EKG Q-T INTERVAL: 338 MS
EKG QRS DURATION: 68 MS
EKG QRS DURATION: 78 MS
EKG QTC CALCULATION (BAZETT): 436 MS
EKG QTC CALCULATION (BAZETT): 492 MS
EKG R AXIS: 32 DEGREES
EKG R AXIS: 37 DEGREES
EKG T AXIS: 34 DEGREES
EKG T AXIS: 52 DEGREES
EKG VENTRICULAR RATE: 100 BPM
EKG VENTRICULAR RATE: 160 BPM
EOSINOPHIL # BLD: 0 K/UL (ref 0–0.6)
EOSINOPHIL NFR BLD: 0 %
EPI CELLS #/AREA URNS HPF: NORMAL /HPF (ref 0–5)
ETHANOLAMINE SERPL-MCNC: NORMAL MG/DL (ref 0–0.08)
FLUAV RNA RESP QL NAA+PROBE: NOT DETECTED
FLUBV RNA RESP QL NAA+PROBE: NOT DETECTED
GFR SERPLBLD CREATININE-BSD FMLA CKD-EPI: 26 ML/MIN/{1.73_M2}
GFR SERPLBLD CREATININE-BSD FMLA CKD-EPI: 27 ML/MIN/{1.73_M2}
GLUCOSE BLD-MCNC: 178 MG/DL (ref 70–99)
GLUCOSE BLD-MCNC: 247 MG/DL (ref 70–99)
GLUCOSE BLD-MCNC: 252 MG/DL (ref 70–99)
GLUCOSE SERPL-MCNC: 181 MG/DL (ref 70–99)
GLUCOSE SERPL-MCNC: 215 MG/DL (ref 70–99)
GLUCOSE UR STRIP.AUTO-MCNC: NEGATIVE MG/DL
HCO3 BLDV-SCNC: 16.1 MMOL/L (ref 23–29)
HCT VFR BLD AUTO: 27.8 % (ref 40.5–52.5)
HGB BLD-MCNC: 9.3 G/DL (ref 13.5–17.5)
HGB UR QL STRIP.AUTO: NEGATIVE
HYALINE CASTS #/AREA URNS LPF: NORMAL /LPF (ref 0–2)
KETONES UR STRIP.AUTO-MCNC: ABNORMAL MG/DL
LACTATE BLD-SCNC: 1.53 MMOL/L (ref 0.4–2)
LACTATE BLDV-SCNC: 2.1 MMOL/L (ref 0.4–1.9)
LACTATE BLDV-SCNC: 2.5 MMOL/L (ref 0.4–1.9)
LACTATE BLDV-SCNC: 2.8 MMOL/L (ref 0.4–2)
LEUKOCYTE ESTERASE UR QL STRIP.AUTO: NEGATIVE
LYMPHOCYTES # BLD: 0.4 K/UL (ref 1–5.1)
LYMPHOCYTES NFR BLD: 1.7 %
MCH RBC QN AUTO: 29.3 PG (ref 26–34)
MCHC RBC AUTO-ENTMCNC: 33.4 G/DL (ref 31–36)
MCV RBC AUTO: 87.8 FL (ref 80–100)
MONOCYTES # BLD: 0.9 K/UL (ref 0–1.3)
MONOCYTES NFR BLD: 3.7 %
NEUTROPHILS # BLD: 22.4 K/UL (ref 1.7–7.7)
NEUTROPHILS NFR BLD: 94.3 %
NITRITE UR QL STRIP.AUTO: NEGATIVE
PCO2 BLDV: 29.3 MM HG (ref 40–50)
PERFORMED ON: ABNORMAL
PH BLDV: 7.35 [PH] (ref 7.35–7.45)
PH UR STRIP.AUTO: 5.5 [PH] (ref 5–8)
PLATELET # BLD AUTO: 256 K/UL (ref 135–450)
PMV BLD AUTO: 7.7 FL (ref 5–10.5)
PO2 BLDV: 40 MM HG
POC SAMPLE TYPE: ABNORMAL
POTASSIUM SERPL-SCNC: 4.2 MMOL/L (ref 3.5–5.1)
POTASSIUM SERPL-SCNC: 4.6 MMOL/L (ref 3.5–5.1)
PROT UR STRIP.AUTO-MCNC: 30 MG/DL
RBC # BLD AUTO: 3.16 M/UL (ref 4.2–5.9)
RBC #/AREA URNS HPF: NORMAL /HPF (ref 0–4)
REPORT: NORMAL
SAO2 % BLDV: 73 %
SARS-COV-2 RNA RESP QL NAA+PROBE: NOT DETECTED
SODIUM SERPL-SCNC: 128 MMOL/L (ref 136–145)
SODIUM SERPL-SCNC: 131 MMOL/L (ref 136–145)
SP GR UR STRIP.AUTO: >=1.03 (ref 1–1.03)
TROPONIN, HIGH SENSITIVITY: 208 NG/L (ref 0–22)
TROPONIN, HIGH SENSITIVITY: 435 NG/L (ref 0–22)
UA COMPLETE W REFLEX CULTURE PNL UR: ABNORMAL
UA DIPSTICK W REFLEX MICRO PNL UR: YES
URN SPEC COLLECT METH UR: ABNORMAL
UROBILINOGEN UR STRIP-ACNC: 0.2 E.U./DL
VANCOMYCIN SERPL-MCNC: 13.2 UG/ML
WBC # BLD AUTO: 23.8 K/UL (ref 4–11)
WBC #/AREA URNS HPF: NORMAL /HPF (ref 0–5)

## 2023-12-27 PROCEDURE — 6360000002 HC RX W HCPCS: Performed by: INTERNAL MEDICINE

## 2023-12-27 PROCEDURE — 73720 MRI LWR EXTREMITY W/O&W/DYE: CPT

## 2023-12-27 PROCEDURE — 82728 ASSAY OF FERRITIN: CPT

## 2023-12-27 PROCEDURE — 2580000003 HC RX 258: Performed by: EMERGENCY MEDICINE

## 2023-12-27 PROCEDURE — 93306 TTE W/DOPPLER COMPLETE: CPT

## 2023-12-27 PROCEDURE — 51702 INSERT TEMP BLADDER CATH: CPT

## 2023-12-27 PROCEDURE — 82803 BLOOD GASES ANY COMBINATION: CPT

## 2023-12-27 PROCEDURE — 96361 HYDRATE IV INFUSION ADD-ON: CPT

## 2023-12-27 PROCEDURE — 2500000003 HC RX 250 WO HCPCS: Performed by: NURSE PRACTITIONER

## 2023-12-27 PROCEDURE — 96365 THER/PROPH/DIAG IV INF INIT: CPT

## 2023-12-27 PROCEDURE — 81001 URINALYSIS AUTO W/SCOPE: CPT

## 2023-12-27 PROCEDURE — 80048 BASIC METABOLIC PNL TOTAL CA: CPT

## 2023-12-27 PROCEDURE — 6370000000 HC RX 637 (ALT 250 FOR IP): Performed by: STUDENT IN AN ORGANIZED HEALTH CARE EDUCATION/TRAINING PROGRAM

## 2023-12-27 PROCEDURE — 96367 TX/PROPH/DG ADDL SEQ IV INF: CPT

## 2023-12-27 PROCEDURE — 2580000003 HC RX 258: Performed by: STUDENT IN AN ORGANIZED HEALTH CARE EDUCATION/TRAINING PROGRAM

## 2023-12-27 PROCEDURE — 99222 1ST HOSP IP/OBS MODERATE 55: CPT | Performed by: INTERNAL MEDICINE

## 2023-12-27 PROCEDURE — 6370000000 HC RX 637 (ALT 250 FOR IP): Performed by: INTERNAL MEDICINE

## 2023-12-27 PROCEDURE — 36592 COLLECT BLOOD FROM PICC: CPT

## 2023-12-27 PROCEDURE — 83605 ASSAY OF LACTIC ACID: CPT

## 2023-12-27 PROCEDURE — 93005 ELECTROCARDIOGRAM TRACING: CPT | Performed by: NURSE PRACTITIONER

## 2023-12-27 PROCEDURE — 96366 THER/PROPH/DIAG IV INF ADDON: CPT

## 2023-12-27 PROCEDURE — 99291 CRITICAL CARE FIRST HOUR: CPT | Performed by: INTERNAL MEDICINE

## 2023-12-27 PROCEDURE — 36415 COLL VENOUS BLD VENIPUNCTURE: CPT

## 2023-12-27 PROCEDURE — 6360000004 HC RX CONTRAST MEDICATION: Performed by: STUDENT IN AN ORGANIZED HEALTH CARE EDUCATION/TRAINING PROGRAM

## 2023-12-27 PROCEDURE — 36556 INSERT NON-TUNNEL CV CATH: CPT

## 2023-12-27 PROCEDURE — 6360000002 HC RX W HCPCS: Performed by: STUDENT IN AN ORGANIZED HEALTH CARE EDUCATION/TRAINING PROGRAM

## 2023-12-27 PROCEDURE — 2580000003 HC RX 258: Performed by: NURSE PRACTITIONER

## 2023-12-27 PROCEDURE — 71045 X-RAY EXAM CHEST 1 VIEW: CPT

## 2023-12-27 PROCEDURE — 2000000000 HC ICU R&B

## 2023-12-27 PROCEDURE — 2580000003 HC RX 258: Performed by: INTERNAL MEDICINE

## 2023-12-27 PROCEDURE — 83540 ASSAY OF IRON: CPT

## 2023-12-27 PROCEDURE — 87641 MR-STAPH DNA AMP PROBE: CPT

## 2023-12-27 PROCEDURE — 93010 ELECTROCARDIOGRAM REPORT: CPT | Performed by: INTERNAL MEDICINE

## 2023-12-27 PROCEDURE — 84484 ASSAY OF TROPONIN QUANT: CPT

## 2023-12-27 PROCEDURE — 83550 IRON BINDING TEST: CPT

## 2023-12-27 PROCEDURE — 51798 US URINE CAPACITY MEASURE: CPT

## 2023-12-27 PROCEDURE — 82746 ASSAY OF FOLIC ACID SERUM: CPT

## 2023-12-27 PROCEDURE — 82947 ASSAY GLUCOSE BLOOD QUANT: CPT

## 2023-12-27 PROCEDURE — 80202 ASSAY OF VANCOMYCIN: CPT

## 2023-12-27 PROCEDURE — 82607 VITAMIN B-12: CPT

## 2023-12-27 PROCEDURE — A9579 GAD-BASE MR CONTRAST NOS,1ML: HCPCS | Performed by: STUDENT IN AN ORGANIZED HEALTH CARE EDUCATION/TRAINING PROGRAM

## 2023-12-27 PROCEDURE — 6360000002 HC RX W HCPCS: Performed by: EMERGENCY MEDICINE

## 2023-12-27 PROCEDURE — 02HV33Z INSERTION OF INFUSION DEVICE INTO SUPERIOR VENA CAVA, PERCUTANEOUS APPROACH: ICD-10-PCS | Performed by: INTERNAL MEDICINE

## 2023-12-27 PROCEDURE — 85025 COMPLETE CBC W/AUTO DIFF WBC: CPT

## 2023-12-27 PROCEDURE — 2500000003 HC RX 250 WO HCPCS: Performed by: STUDENT IN AN ORGANIZED HEALTH CARE EDUCATION/TRAINING PROGRAM

## 2023-12-27 PROCEDURE — 3E033XZ INTRODUCTION OF VASOPRESSOR INTO PERIPHERAL VEIN, PERCUTANEOUS APPROACH: ICD-10-PCS | Performed by: INTERNAL MEDICINE

## 2023-12-27 RX ORDER — GABAPENTIN 300 MG/1
300 CAPSULE ORAL 3 TIMES DAILY
Status: DISCONTINUED | OUTPATIENT
Start: 2023-12-27 | End: 2024-01-06 | Stop reason: HOSPADM

## 2023-12-27 RX ORDER — ACETAMINOPHEN 325 MG/1
650 TABLET ORAL EVERY 6 HOURS PRN
Status: DISCONTINUED | OUTPATIENT
Start: 2023-12-27 | End: 2024-01-06 | Stop reason: HOSPADM

## 2023-12-27 RX ORDER — SODIUM CHLORIDE 0.9 % (FLUSH) 0.9 %
5-40 SYRINGE (ML) INJECTION PRN
Status: DISCONTINUED | OUTPATIENT
Start: 2023-12-27 | End: 2024-01-06 | Stop reason: HOSPADM

## 2023-12-27 RX ORDER — DEXTROSE MONOHYDRATE 100 MG/ML
INJECTION, SOLUTION INTRAVENOUS CONTINUOUS PRN
Status: DISCONTINUED | OUTPATIENT
Start: 2023-12-27 | End: 2024-01-06 | Stop reason: HOSPADM

## 2023-12-27 RX ORDER — INSULIN LISPRO 100 [IU]/ML
0-4 INJECTION, SOLUTION INTRAVENOUS; SUBCUTANEOUS NIGHTLY
Status: DISCONTINUED | OUTPATIENT
Start: 2023-12-27 | End: 2024-01-06 | Stop reason: HOSPADM

## 2023-12-27 RX ORDER — SODIUM CHLORIDE 9 MG/ML
INJECTION, SOLUTION INTRAVENOUS PRN
Status: DISCONTINUED | OUTPATIENT
Start: 2023-12-27 | End: 2024-01-06 | Stop reason: HOSPADM

## 2023-12-27 RX ORDER — SODIUM CHLORIDE, SODIUM LACTATE, POTASSIUM CHLORIDE, CALCIUM CHLORIDE 600; 310; 30; 20 MG/100ML; MG/100ML; MG/100ML; MG/100ML
INJECTION, SOLUTION INTRAVENOUS CONTINUOUS
Status: DISCONTINUED | OUTPATIENT
Start: 2023-12-27 | End: 2023-12-29

## 2023-12-27 RX ORDER — FAMOTIDINE 10 MG/ML
20 INJECTION, SOLUTION INTRAVENOUS DAILY
Status: COMPLETED | OUTPATIENT
Start: 2023-12-27 | End: 2023-12-28

## 2023-12-27 RX ORDER — SODIUM CHLORIDE, SODIUM LACTATE, POTASSIUM CHLORIDE, AND CALCIUM CHLORIDE .6; .31; .03; .02 G/100ML; G/100ML; G/100ML; G/100ML
1000 INJECTION, SOLUTION INTRAVENOUS ONCE
Status: COMPLETED | OUTPATIENT
Start: 2023-12-27 | End: 2023-12-27

## 2023-12-27 RX ORDER — INSULIN LISPRO 100 [IU]/ML
0-8 INJECTION, SOLUTION INTRAVENOUS; SUBCUTANEOUS
Status: DISCONTINUED | OUTPATIENT
Start: 2023-12-27 | End: 2024-01-06 | Stop reason: HOSPADM

## 2023-12-27 RX ORDER — INSULIN GLARGINE 100 [IU]/ML
0.15 INJECTION, SOLUTION SUBCUTANEOUS NIGHTLY
Status: DISCONTINUED | OUTPATIENT
Start: 2023-12-27 | End: 2024-01-06 | Stop reason: HOSPADM

## 2023-12-27 RX ORDER — ROSUVASTATIN CALCIUM 10 MG/1
10 TABLET, COATED ORAL DAILY
Status: DISCONTINUED | OUTPATIENT
Start: 2023-12-27 | End: 2024-01-06 | Stop reason: HOSPADM

## 2023-12-27 RX ORDER — ASPIRIN 81 MG/1
81 TABLET ORAL DAILY
Status: DISCONTINUED | OUTPATIENT
Start: 2023-12-27 | End: 2024-01-06 | Stop reason: HOSPADM

## 2023-12-27 RX ORDER — 0.9 % SODIUM CHLORIDE 0.9 %
500 INTRAVENOUS SOLUTION INTRAVENOUS ONCE
Status: COMPLETED | OUTPATIENT
Start: 2023-12-27 | End: 2023-12-27

## 2023-12-27 RX ORDER — GLUCAGON 1 MG/ML
1 KIT INJECTION PRN
Status: DISCONTINUED | OUTPATIENT
Start: 2023-12-27 | End: 2024-01-06 | Stop reason: HOSPADM

## 2023-12-27 RX ORDER — ENOXAPARIN SODIUM 100 MG/ML
40 INJECTION SUBCUTANEOUS DAILY
Status: DISCONTINUED | OUTPATIENT
Start: 2023-12-27 | End: 2024-01-06 | Stop reason: HOSPADM

## 2023-12-27 RX ORDER — SODIUM CHLORIDE 0.9 % (FLUSH) 0.9 %
5-40 SYRINGE (ML) INJECTION EVERY 12 HOURS SCHEDULED
Status: DISCONTINUED | OUTPATIENT
Start: 2023-12-27 | End: 2024-01-06 | Stop reason: HOSPADM

## 2023-12-27 RX ORDER — ACETAMINOPHEN 650 MG/1
650 SUPPOSITORY RECTAL EVERY 6 HOURS PRN
Status: DISCONTINUED | OUTPATIENT
Start: 2023-12-27 | End: 2024-01-06 | Stop reason: HOSPADM

## 2023-12-27 RX ORDER — INSULIN LISPRO 100 [IU]/ML
0.05 INJECTION, SOLUTION INTRAVENOUS; SUBCUTANEOUS
Status: DISCONTINUED | OUTPATIENT
Start: 2023-12-27 | End: 2024-01-06 | Stop reason: HOSPADM

## 2023-12-27 RX ADMIN — Medication 10 ML: at 11:13

## 2023-12-27 RX ADMIN — GABAPENTIN 300 MG: 300 CAPSULE ORAL at 19:50

## 2023-12-27 RX ADMIN — FAMOTIDINE 20 MG: 10 INJECTION, SOLUTION INTRAVENOUS at 10:58

## 2023-12-27 RX ADMIN — SODIUM CHLORIDE 4 MCG/MIN: 9 INJECTION, SOLUTION INTRAVENOUS at 01:26

## 2023-12-27 RX ADMIN — INSULIN LISPRO 4 UNITS: 100 INJECTION, SOLUTION INTRAVENOUS; SUBCUTANEOUS at 17:45

## 2023-12-27 RX ADMIN — SODIUM CHLORIDE, POTASSIUM CHLORIDE, SODIUM LACTATE AND CALCIUM CHLORIDE 1000 ML: 600; 310; 30; 20 INJECTION, SOLUTION INTRAVENOUS at 16:17

## 2023-12-27 RX ADMIN — ENOXAPARIN SODIUM 40 MG: 100 INJECTION SUBCUTANEOUS at 11:24

## 2023-12-27 RX ADMIN — MUPIROCIN: 20 OINTMENT TOPICAL at 11:00

## 2023-12-27 RX ADMIN — ROSUVASTATIN 10 MG: 10 TABLET, FILM COATED ORAL at 10:58

## 2023-12-27 RX ADMIN — INSULIN GLARGINE 13 UNITS: 100 INJECTION, SOLUTION SUBCUTANEOUS at 19:50

## 2023-12-27 RX ADMIN — INSULIN LISPRO 4 UNITS: 100 INJECTION, SOLUTION INTRAVENOUS; SUBCUTANEOUS at 12:25

## 2023-12-27 RX ADMIN — SODIUM CHLORIDE 500 ML: 9 INJECTION, SOLUTION INTRAVENOUS at 03:56

## 2023-12-27 RX ADMIN — GABAPENTIN 300 MG: 300 CAPSULE ORAL at 14:49

## 2023-12-27 RX ADMIN — GABAPENTIN 300 MG: 300 CAPSULE ORAL at 10:58

## 2023-12-27 RX ADMIN — ASPIRIN 81 MG: 81 TABLET, COATED ORAL at 10:58

## 2023-12-27 RX ADMIN — SODIUM CHLORIDE 20 MCG/MIN: 0.9 INJECTION, SOLUTION INTRAVENOUS at 03:34

## 2023-12-27 RX ADMIN — INSULIN LISPRO 2 UNITS: 100 INJECTION, SOLUTION INTRAVENOUS; SUBCUTANEOUS at 12:25

## 2023-12-27 RX ADMIN — SODIUM CHLORIDE: 9 INJECTION, SOLUTION INTRAVENOUS at 22:15

## 2023-12-27 RX ADMIN — SODIUM CHLORIDE, POTASSIUM CHLORIDE, SODIUM LACTATE AND CALCIUM CHLORIDE: 600; 310; 30; 20 INJECTION, SOLUTION INTRAVENOUS at 03:33

## 2023-12-27 RX ADMIN — MUPIROCIN: 20 OINTMENT TOPICAL at 19:35

## 2023-12-27 RX ADMIN — SODIUM CHLORIDE, POTASSIUM CHLORIDE, SODIUM LACTATE AND CALCIUM CHLORIDE: 600; 310; 30; 20 INJECTION, SOLUTION INTRAVENOUS at 17:48

## 2023-12-27 RX ADMIN — CEFEPIME 2000 MG: 2 INJECTION, POWDER, FOR SOLUTION INTRAVENOUS at 22:15

## 2023-12-27 RX ADMIN — VANCOMYCIN HYDROCHLORIDE 1000 MG: 1 INJECTION, POWDER, LYOPHILIZED, FOR SOLUTION INTRAVENOUS at 19:34

## 2023-12-27 RX ADMIN — CEFEPIME 2000 MG: 2 INJECTION, POWDER, FOR SOLUTION INTRAVENOUS at 11:24

## 2023-12-27 RX ADMIN — Medication 10 ML: at 19:34

## 2023-12-27 RX ADMIN — GADOTERIDOL 20 ML: 279.3 INJECTION, SOLUTION INTRAVENOUS at 15:48

## 2023-12-27 RX ADMIN — VANCOMYCIN HYDROCHLORIDE 2000 MG: 1 INJECTION, POWDER, LYOPHILIZED, FOR SOLUTION INTRAVENOUS at 00:18

## 2023-12-27 ASSESSMENT — PAIN SCALES - GENERAL: PAINLEVEL_OUTOF10: 0

## 2023-12-27 NOTE — CONSULTS
[facility] Nurse  Consult Note       NAME:  Ant Carrasquillo  MEDICAL RECORD NUMBER:  [id number]  AGE: 68 y.o.   GENDER: male  : 1955  TODAY'S DATE:  2023    Subjective; I am going to see Dr. Luong but got so sick couldn't  piece toilet paper   Reason for WOCN Evaluation and Assessment:        Diabetic wounds      Ant Carrasquillo is a 68 y.o. male referred by:   [] Physician  [x] Nursing  [] Other:     Wound Identification:  Wound Type: diabetic  Contributing Factors: diabetes    Wound History: 68 y.o. male who presented to [facility] with shaking.  PMHx significant for diabetes, hypertension, hyperlipidemia, diabetic foot ulcer, right foot toe amputation.    Current Wound Care Treatment:  MAI    Patient Goal of Care:  [x] Wound Healing  [] Odor Control  [] Palliative Care  [x] Pain Control   [] Other:         PAST MEDICAL HISTORY        Diagnosis Date    Diabetes mellitus (HCC)     Hyperlipidemia     Hypertension        PAST SURGICAL HISTORY    Past Surgical History:   Procedure Laterality Date    APPENDECTOMY      COLONOSCOPY      EYE SURGERY Left     lasik    FOOT SURGERY Right 2017    big toe    TOE AMPUTATION Right 3/3/2020    AMPUTATION OF RIGHT GREAT TOE performed by Candi Nixon DPM at [facility] OR       FAMILY HISTORY    Family History   Problem Relation Age of Onset    Other Mother         complication from hip surgery    Diabetes Father     No Known Problems Sister     No Known Problems Sister     No Known Problems Brother     No Known Problems Maternal Grandmother     No Known Problems Maternal Grandfather     No Known Problems Paternal Grandmother     No Known Problems Paternal Grandfather        SOCIAL HISTORY    Social History     Tobacco Use    Smoking status: Never    Smokeless tobacco: Never   Vaping Use    Vaping Use: Never used   Substance Use Topics    Alcohol use: Yes     Comment: ETOH today    Drug use: Never       ALLERGIES    No Known  12/27/23 1714   Dressing/Treatment Xeroform;Foam 12/27/23 1714   Offloading for Diabetic Foot Ulcers Offloading ordered 12/27/23 1714   Dressing Change Due 12/28/23 12/27/23 1714   Wound Length (cm) 0.3 cm 12/27/23 1714   Wound Width (cm) 0.3 cm 12/27/23 1714   Wound Depth (cm) 0.1 cm 12/27/23 1714   Wound Surface Area (cm^2) 0.09 cm^2 12/27/23 1714   Wound Volume (cm^3) 0.009 cm^3 12/27/23 1714   Distance Tunneling (cm) 0 cm 12/27/23 1714   Tunneling Position ___ O'Clock 0 12/27/23 1714   Undermining Starts ___ O'Clock 0 12/27/23 1714   Undermining Ends___ O'Clock 0 12/27/23 1714   Undermining Maxium Distance (cm) 0 12/27/23 1714   Wound Assessment Dry 12/27/23 1714   Drainage Amount None (dry) 12/27/23 1714   Odor None 12/27/23 1714   Christiana-wound Assessment Dry/flaky 12/27/23 1714   Margins Unattached edges 12/27/23 1714   Wound Thickness Description not for Pressure Injury Partial thickness 12/27/23 1714   Number of days: 0    Rt plantar     Wound 12/27/23 Toe (Comment  which one) Left;Medial medial hallux left (Active)   Wound Image   12/27/23 1714   Wound Etiology Diabetic Aquino 2 12/27/23 1714   Dressing Status New dressing applied 12/27/23 1714   Wound Cleansed Cleansed with saline 12/27/23 1714   Dressing/Treatment Xeroform;Gauze dressing/dressing sponge 12/27/23 1714   Offloading for Diabetic Foot Ulcers Offloading ordered 12/27/23 1714   Dressing Change Due 12/28/23 12/27/23 1714   Wound Length (cm) 0.2 cm 12/27/23 1714   Wound Width (cm) 1 cm 12/27/23 1714   Wound Depth (cm) 0.1 cm 12/27/23 1714   Wound Surface Area (cm^2) 0.2 cm^2 12/27/23 1714   Wound Volume (cm^3) 0.02 cm^3 12/27/23 1714   Distance Tunneling (cm) 0 cm 12/27/23 1714   Tunneling Position ___ O'Clock 0 12/27/23 1714   Undermining Starts ___ O'Clock 0 12/27/23 1714   Undermining Ends___ O'Clock 0 12/27/23 1714   Undermining Maxium Distance (cm) 0 12/27/23 1714   Wound Assessment Pink/red 12/27/23 1714   Drainage Amount None (dry) 12/27/23

## 2023-12-27 NOTE — PROGRESS NOTES
0322: Patient admitted to room 234 from emergency department. Levophed gtt infusing @20mcg/min via central line. Four eyed skin assessment completed, wound consulted per hospital policy for wounds present on admission. CHG bath completed, call light education provided and placed within reach.     Electronically signed by Nivia Haas RN on 12/27/2023 at 4:37 AM

## 2023-12-27 NOTE — PROGRESS NOTES
0500: patient's spouse notified of admission to ICU, all questions answered appropriately.     Electronically signed by Nivia Haas RN on 12/27/2023 at 5:32 AM

## 2023-12-27 NOTE — DISCHARGE INSTRUCTIONS
Wound Care;  Daily cleanse with normal saline bi lateral diabetic feet, pat dry.     Right mid plantar raised area with open dry area apply Xeroform cover with foam /     Right plantar near base of toes, dry area paint with Betadine swab.   Left large toe medial base posterior open area, apply Xeroform, dry gauze, soft tape.    [employer] MONITOR PATCH PATIENT INSTRUCTIONS    Remove Patch in 1 week from application.  Recalibrate and apply next patch.  Call  [employer] CUSTOMER SUPPORT: [phone] and they will assist if needed.    o PIN: [id number]    o Keep the phone with you as much as possible. Be sure to charge the phone overnight and when the battery gets low. If the phone dies completely, be sure to restart the phone and enter the PIN number to confirm the patch is connected to your phone.    o If you are away from the phone for more than 8 hours, please stay within a 30 foot range of the phone for 3 hours to ensure your data fully uploads.    o If you go somewhere with no cellphone service, still keep the phone with you and charged. Your data will upload when you reach cellphone service. You can stay outside of cellphone range for up to 10 days.    o This phone cannot make calls, take pictures, etc. It is solely for medical use and will only Bluetooth connect to your patch.    · Patch Instructions:    o Wait to exercise or shower for 30 minutes after application.    o Shower with the water stream to your back and avoid putting the patch directly under the water stream.    o Do not bathe, swim or fully submerge the patch.    o If the patch starts to lift off after showering or sweating, dry the patch with a towel and allow the adhesive to dry. It should re-adhere to your skin. If it continues to lift, use the adhesive overlay to re-adhere the patch. Video instructions for the adhesive overlay are on the phone under the Menu Tab in the right hand corner - “Help- [employer] Adhesive Overlay”    o One patch

## 2023-12-27 NOTE — CONSULTS
Infectious Diseases   Consult Note      Reason for Consult:  right foot infection, sepsis    Requesting Physician: Dr. Murguia     Date of Admission: 12/26/2023  Subjective:   CHIEF COMPLAINT: rigors     HPI:  68 y.o. male with hx of HTN, HLD, DM that presented on 12/27 for rigors and shaking chills. He has hx of diabetic foot ulcer and right hallux amputation in 3/2020. Denies any fevers, chest pain, shortness of breath, cough, nausea or diarrhea. Patient has a chronic ulcer on the bottom of the right foot that has been draining more recently. He states worsening in last week, does not always wear shoes in the house and does have neuropathy, denies any injury known to the area. The patient also has small wound on the area of right hallux amputation that he sustained about 1 yr ago when he crushed it on sliding door, states it bleeds occasionally. Has not followed with podiatrist but states made new appt for Jan 2024.   On presentation WBC was 3.9 initially that trended up to 28.8, he was noted to have temp of 101.9 and was admitted to the CVU.   He was started on vanco and cefepime. X ray right foot showed ulcer with no bony erosion. Mri is pending. ESR was 73.                      Current abx: vanco, cefepime          Past Surgical History:       Diagnosis Date    Diabetes mellitus (HCC)     Hyperlipidemia     Hypertension          Procedure Laterality Date    APPENDECTOMY      COLONOSCOPY      EYE SURGERY Left     lasik    FOOT SURGERY Right 2017    big toe    TOE AMPUTATION Right 3/3/2020    AMPUTATION OF RIGHT GREAT TOE performed by Candi Nixon DPM at [facility] ASC OR       Social History:    TOBACCO:   reports that he has never smoked. He has never used smokeless tobacco.  ETOH:   reports current alcohol use.  There is no history of illicit drug use or other significant epidemiologic exposures.    Family History:       Problem Relation Age of Onset    Other Mother         complication from hip surgery     white, conjunctive full.  OP with moist mucosal membranes, no thrush, tongue protrudes midline  NECK:  Supple, symmetrical, trachea midline, no adenopathy  LUNGS:  no increased work of breathing and clear to auscultation. No accessory muscle use  CARDIOVASCULAR: S1 and S2, no murmur  ABDOMEN:  normal bowel sounds, non-tender, non-distended, no hepatosplenomegaly  LYMPHADENOPATHY:  no axillary or supraclavicular adenopathy. No cervical adnenopathy  PSYCHIATRIC: Oriented to person place and time. No obvious depression or anxiety.  MUSCULOSKELETAL: slight edema right lower extremity, right foot big toe with amputation and ulceration on the plantar aspect in the middle of the foot with swelling, no drainage significant erythema or warmth   SKIN:  normal skin color, texture, turgor and no redness, warmth, or swelling. No palpable nodules or stigmata of embolic phenomenon  NEUROLOGIC: nonfocal exam  ACCESS: right IJ      DATA:    Old records have been reviewed    CBC:  Recent Labs     12/26/23 2300 12/27/23  0603   WBC 3.9* 23.8*   RBC 3.75* 3.16*   HGB 11.2* 9.3*   HCT 32.9* 27.8*    256   MCV 87.8 87.8   MCH 29.9 29.3   MCHC 34.0 33.4   RDW 12.9 12.8      BMP:  Recent Labs     12/26/23  2300 12/27/23  0603   * 131*   K 4.6 4.2   CL 96* 101   CO2 16* 14*   BUN 37* 38*   CREATININE 2.2* 2.5*   CALCIUM 9.3 8.1*   GLUCOSE 153* 181*        Cultures:   Bcx x 2 12/26: neg to date     Radiology Review:  All pertinent images / reports were reviewed as a part of this visit.     X ray right foot 12/27:  Ulcer/cellulitis along the plantar aspect of the mid foot however there is no adjacent bony erosion to suggest osteomyelitis.  Pes planus with postsurgical changes and extensive degenerative changes as above.    Portable chest X ray 12/27:  Placement of right IJ line with tip projecting over the SVC otherwise no  significant interval change.    Assessment:     Patient Active Problem List   Diagnosis    Type 2 diabetes

## 2023-12-27 NOTE — PLAN OF CARE
Hospital Medicine Plan of Care Note      Date of Admission: 12/26/2023  Hospital Day: 2    Chief Admission Complaint:  Rigors      Subjective:  no new c/o.     Presenting Admission History:         \"68 y.o. male who presented to [facility] with shaking.  PMHx significant for diabetes, hypertension, hyperlipidemia, diabetic foot ulcer, right foot toe amputation.  Patient states around 9 PM yesterday he started to develop significant shaking associated with nausea.  He states that shaking also bad that he was unable to do anything and decided to come to the ER.  Denies any fevers, chest pain, shortness of breath, cough, nausea or diarrhea, foot pain, presyncope or syncope.  Patient states that he has an ulcer on the bottom of his foot which has been draining recently.  He also had 1 episode of vomiting in the ER\"     Assessment/Plan:      Current Principal Problem:  Septic shock (HCC)      Septic shock likely secondary to diabetic foot infection with concern for osteomyelitis  Diabetic foot ulcer  Elevated troponin in the setting of septic shock  Diabetes  Hypertension  Hyperlipidemia  History of right foot toe amputation     Plan:  Received IV fluid boluses in ER and started on Levophed with central line placement in the ER  Improvement in lactic acidosis, will continue to trend lactic acid  Broad-spectrum antibiotics with vancomycin and cefepime, blood cultures pending  Will obtain MRI of the foot for better evaluation of his diabetic foot ulcer with suspected abscess and potentially osteomyelitis given elevated ESR  Insulin for glucose management  Hold home anti-hypertensives  Cardiology consulted from the ER for troponin, recommend trending troponin in the morning.    Physical Exam NOT necessarily Performed:        /62   Pulse 93   Temp 97.7 °F (36.5 °C) (Core)   Resp 20   Ht 1.778 m (5' 10\")   Wt 92.1 kg (203 lb 0.7 oz)   SpO2 98%   BMI 29.13 kg/m²     Diet: ADULT DIET; Regular; 4 carb choices

## 2023-12-27 NOTE — ED PROVIDER NOTES
Emergency Department Attending Provider Note  Location: [facility] C2 CARD TELEMETRY  12/26/2023     Patient Identification  Ant Carrasquillo is a 68 y.o. male      HPI:Ant Carrasquillo was evaluated in the Emergency Department for tremors.  Patient said that over the past few days he had not been feeling well but that a few hours ago he just began shaking uncontrollably.. Although initial history and physical exam information was obtained by LIANNA/NPP/MD/ (who also dictated a record of this visit), I personally saw the patient and performed a substantive portion of the visit including all aspects of the medical decision making.      PHYSICAL EXAM:  Nontoxic but ill-appearing adult male in no acute distress, he is tachycardic.  Abdomen is soft nondistended nontender no chest wall tenderness.  Normal lung and bowel sounds.  Patient has an ulcer with surrounding swelling to the right foot but it is nontender there is no active drainage no significant erythema of the foot no crepitus.    EKG Interpretation  Twelve-lead EKG as read and interpreted by myself shows normal sinus rhythm at a rate of 100 bpm, LA interval QRS and QTc normal.  Normal axis no acute ischemic findings.    Repeat twelve-lead EKG showed sinus tachycardia at a rate of 160 bpm, LA interval QRS and QTc normal.  No acute ischemic findings.    Patient seen and evaluated.  Relevant records reviewed.    MDM:  Adult male who comes in for tremors.  Infection was initially suspected with no identified source.  Did not seem that his foot was the main source.  Patient was afebrile.  Vital signs was initially stable.  Laboratory studies were obtained.  Patient while in the emergency room however developed tachycardia and was tremulous and febrile.  At that time IV fluids were started as well as antipyretic and IV antibiotics.  Diagnostic workup did not reveal any obvious sources of infection however patient is diabetic with a foot ulcer with swelling this is  likely the source.  This patient will require admission to the hospital.  Patient had several episodes of hypotension here in the emergency room even after fluid resuscitation.  This necessitated placement of a central line which was placed by LIANNA.  Patient is started on pressors.  He will be admitted to the ICU.  The hospitalist was notified by the LIANNA and EP we did admit this patient to his service.    CLINICAL IMPRESSION  1. Septic shock (HCC)            I personally saw the patient and independently provided 40 minutes of non-concurrent critical care out of the total shared critical care time provided.    This chart was generated in part by using Dragon Dictation system and may contain errors related to that system including errors in grammar, punctuation, and spelling, as well as words and phrases that may be inappropriate. If there are any questions or concerns please feel free to contact the dictating provider for clarification.     Carolin Clement MD  I am the primary clinician of record.    [employer]       Carolin Clement MD  12/27/23 0874

## 2023-12-27 NOTE — CONSULTS
vial 0-4 Units, Nightly  vancomycin (VANCOCIN) intermittent dosing (placeholder), RX Placeholder  mupirocin (BACTROBAN) 2 % ointment, BID  perflutren lipid microspheres (DEFINITY) injection 1.5 mL, ONCE PRN        Allergies:  Patient has no known allergies.     Review of Systems:   A 14 point review of symptoms completed. Pertinent positives identified in the HPI, all other review of symptoms negative as below.      Objective   PHYSICAL EXAM:    Vitals:    12/27/23 0800   BP: 109/62   Pulse: 93   Resp: 20   Temp: 97.7 °F (36.5 °C)   SpO2: 98%    Weight - Scale: 92.1 kg (203 lb 0.7 oz)         General Appearance:  Alert, cooperative, no distress, appears stated age, able to lie flat and speak in full sentences   Head:  Normocephalic, without obvious abnormality, atraumatic   Throat: Lips, mucosa, and tongue dry   Neck: Supple, no JVP, there is a central line in the right neck   Lungs:   Clear to auscultation bilaterally, respirations unlabored   Chest Wall:  No deformity or tenderness   Heart:  Regular rate and rhythm, S1, S2 normal, 2 out of 6 systolic murmur   Abdomen:   Soft, non-tender,   Extremities: Extremities with edema and wounds in bilateral feet   Pulses: 2+ and symmetric   Skin: As above   Pysch: Normal mood and affect   Neurologic: Generalized weakness        Labs   CBC:   Lab Results   Component Value Date/Time    WBC 23.8 12/27/2023 06:03 AM    RBC 3.16 12/27/2023 06:03 AM    HGB 9.3 12/27/2023 06:03 AM    HCT 27.8 12/27/2023 06:03 AM    MCV 87.8 12/27/2023 06:03 AM    RDW 12.8 12/27/2023 06:03 AM     12/27/2023 06:03 AM     CMP:  Lab Results   Component Value Date/Time     12/27/2023 06:03 AM    K 4.2 12/27/2023 06:03 AM     12/27/2023 06:03 AM    CO2 14 12/27/2023 06:03 AM    BUN 38 12/27/2023 06:03 AM    CREATININE 2.5 12/27/2023 06:03 AM    AGRATIO 0.8 12/26/2023 11:00 PM    LABGLOM 27 12/27/2023 06:03 AM    LABGLOM 83 04/04/2023 12:00 AM    GLUCOSE 181 12/27/2023 06:03 AM     CBCs.    Diabetes, sepsis, shock as per primary service.      Will also try to wean vasopressors.    Critical care [id number]    Thank you for allowing us to participate in the care of Ant Carrasquillo. Please call me with any questions [phone].    Ko Mckeon MD, [facility]   Interventional Cardiologist  [facility]  [phone] [place] Office  [phone] [place] Office  12/27/2023 9:15 AM

## 2023-12-27 NOTE — ED PROVIDER NOTES
[facility]  ED  EMERGENCY DEPARTMENT ENCOUNTER        Pt Name: Ant Carrasquillo  MRN: [id number]  Birthdate 1955  Date of evaluation: 12/26/2023  Provider: GILBERT Olguin - SHRUTHI  PCP: Minnie Coelho MD  Note Started: 2:17 AM EST 12/27/23       I have seen and evaluated this patient with my supervising physician Dr. Clement      CHIEF COMPLAINT       Chief Complaint   Patient presents with    Tremors     States \"I've just had the shakes for two hours.\" Denies any further s/s    Emesis       HISTORY OF PRESENT ILLNESS: 1 or more Elements     History From: the patient  Limitations to history : None    Ant Carrasquillo is a 68 y.o. male who presents to the emergency room today with complaints of \"tremors\".  Patient states that he is just been shaking for the last couple hours, denies any alcohol use, denied fevers states that he just was shaking uncontrollably.  Patient is a diabetic, he does state that he has an ulceration on his foot that has been there chronically.  He is here for further evaluation.    Nursing Notes were all reviewed and agreed with or any disagreements were addressed in the HPI.    REVIEW OF SYSTEMS :      Review of Systems    Positives and Pertinent negatives as per HPI.     SURGICAL HISTORY     Past Surgical History:   Procedure Laterality Date    APPENDECTOMY      COLONOSCOPY      EYE SURGERY Left     lasik    FOOT SURGERY Right 2017    big toe    TOE AMPUTATION Right 3/3/2020    AMPUTATION OF RIGHT GREAT TOE performed by Candi Nixon DPM at [facility] OR       CURRENTMEDICATIONS       Previous Medications    ASPIRIN 325 MG TABLET    Take 1 tablet by mouth daily    CYANOCOBALAMIN (VITAMIN B 12 PO)    Take by mouth    DICLOFENAC (VOLTAREN) 75 MG EC TABLET    Take 1 tablet by mouth 2 times daily (with meals)    ENALAPRIL (VASOTEC) 10 MG TABLET    Take 1 tablet by mouth 2 times daily    GABAPENTIN (NEURONTIN) 800 MG TABLET    Take 1 tablet by mouth daily for 180

## 2023-12-27 NOTE — ACP (ADVANCE CARE PLANNING)
Advance Care Planning     General Advance Care Planning (ACP) Conversation    Date of Conversation: 12/26/2023  Conducted with: Patient with Decision Making Capacity    Healthcare Decision Maker:    Primary Decision Maker: Janet Carrasquillo - Spouse - [phone]  Click here to complete Healthcare Decision Makers including selection of the Healthcare Decision Maker Relationship (ie \"Primary\").   Today we documented Decision Maker(s) consistent with Legal Next of Kin hierarchy.    Content/Action Overview:  Has ACP document(s) NOT on file - requested patient to provide.  States  he believes his brother may have DPOA designation- but no documents currently in Epic.   Remains full code    Length of Voluntary ACP Conversation in minutes:  <16 minutes (Non-Billable)    Sweta Guthrie RN

## 2023-12-27 NOTE — CONSULTS
Nephrology Consult Note          Patient ID: Ant Carrasquillo  Referring/ Physician: Garrick Murguia MD      HPI/Summary:   Ant Carrasquillo is being seen by nephrology for Acute kidney injury (WOLF). He is a 68 y.o. male with a PMH significant for T2DM, hypertesnion, diabetic foot ulcer s/p right foot toe amputation who presented to the ED 12/26/2023 with shaking and nausea. He is noted to have ulcers on his right foot with concern for osteomyelitis.    Seen at bedside  /62, was asl low was 69/52  On low dose levo, being weaned down  230 mL urine since presentation    Plan:   Continue IVF and pressors for hemodynamic support  Check renal US  Abx for diabetic foot infection  He will likely need a LHC later this week. Expect Cr to improve in the next day or so. If Cr is improving no objection to proceeding with a heart cath    Assessment:  WOLF:  - baseline Cr 0.9-1.2  - Cr on admission was 2.5  - associated with septic shock, hypotension  - UA with protein but otherwise bland  - WOLF 2/2 hemodynamic insult    Acute metabolic acidosis:  - 2/2 WOLF  - will try to manage medically.    Septic shock:  - 2/2 diabetic right foot ulcer  - per primary    Hypertension:  - home regimen: enalapril 10 mg BID  - will hold.    Diabetic foot ulcer:  - per ID, podiatry.        [facility] would like to thank you for the opportunity to serve this patient. Please call with any questions or concerns.    Brittany Cevallos MD  [facility]  [address] [place], [place] [id number]  Fax: [phone]  Office: [phone]         CC/Reason for consult:   Right foot ulcer/WOLF        Medications:   aspirin EC, 81 mg, Daily  gabapentin, 300 mg, TID  rosuvastatin, 10 mg, Daily  sodium chloride flush, 5-40 mL, 2 times per day  enoxaparin, 40 mg, Daily  insulin glargine, 0.15 Units/kg, Nightly  insulin lispro, 0.05 Units/kg, TID WC  insulin lispro, 0-8 Units, TID WC  insulin lispro, 0-4 Units, Nightly  vancomycin (VANCOCIN)  intermittent dosing (placeholder), , RX Placeholder  mupirocin, , BID  famotidine (PEPCID) injection, 20 mg, Daily  cefepime, 2,000 mg, Q12H       Patient has no known allergies.    Allergies:   No Known Allergies      Physical Exam/Objective:   Vitals:    12/27/23 0800   BP: 109/62   Pulse: 93   Resp: 20   Temp: 97.7 °F (36.5 °C)   SpO2: 98%       Intake/Output Summary (Last 24 hours) at 12/27/2023 0947  Last data filed at 12/27/2023 0800  Gross per 24 hour   Intake 2294.47 ml   Output 245 ml   Net 2049.47 ml         General appearance: in no acute distress   Respiratory: Respiratory effort normal, bilateral equal chest rise. No wheeze, no crackles  Cardiovascular: Ausculation shows RRR and  no edema   Abdomen: abdomen is soft, non distended, no masses, no pain with palpation.

## 2023-12-27 NOTE — CONSULTS
Pharmacy to Dose Vancomycin    Dx: SSTI  Goal trough = 10-20  Pt wt = 86.2 kg  Recent Labs     12/26/23  2300   CREATININE 2.2*     Recent Labs     12/26/23 2300   WBC 3.9*     Vanc 2000 mg given in ED.  Pulse dose for now.  Vancomycin random 12/27 1800.  Mario Bryant RPH 12/27/2023 3:34 AM

## 2023-12-27 NOTE — CONSULTS
PULMONARY AND CRITICAL CARE INPATIENT NOTE        Ant Carrasquillo   : 1955  MRN: [id number]     Admitting Physician: Mercedes Nguyen MD  Attending Physician: Garrick Murguia MD  PCP: Minnie Coelho MD    Admission: 2023   Date of Service: 2023    Chief Complaint   Patient presents with    Tremors     States \"I've just had the shakes for two hours.\" Denies any further s/s    Emesis           ASSESSMENT & PLAN       68 y.o. pleasant  male patient with:    Hospital Problems             Last Modified POA    * (Principal) Septic shock (HCC) 2023 Yes          # Septic shock  # Fever and shivering  # Diabetic foot ulcer of right foot.  Previous toe amputation.  # Nausea and vomiting  # Acute kidney injury with poor urine output  # Lactic acidosis  # Leukocytosis  # Mildly elevated troponin  Continue broad-spectrum antibiotics.  ID consulted by primary  Titrate vasopressors for map of 65  Follow-up right foot MRI  Trend troponin and follow-up on echo.  Cardiology on board  With lack of urine output and no response to 3 L of fluids will hold maintenance fluids once patient is off vasopressors until nephrology evaluates.  Repeat labs around noon today  DVT prophylaxis      LOS: Hospital Day: 2    Code:Full Code          Subjective/Objective           CC/Reason for Consult: Septic shock        HPI: 2023  68-year-old male patient with history of diabetes, hypertension, dyslipidemia, diabetic foot ulcer with a previous right toe amputation who was admitted on  with shivering, nausea and found to be septic with suspected source of right diabetic foot infection.  Patient denies any new respiratory symptoms of cough or phlegm or shortness of breath.  No other clear source of infection identified.    The following portions of the patient's history were reviewed: past medical history, surgical history, family history, social history, current medications & allergies.       ROS:

## 2023-12-27 NOTE — CONSULTS
[facility]    Consult Note      Requesting Physician:  Ko Mckeon    CHIEF COMPLAINT:  anemia, possible heart cath      HISTORY OF PRESENT ILLNESS:    Ant Carrasquillo is a 68 year old male with PMHX T2DM, HTN, HLD, diabetic foot ulcer, and right foot toe amputation who presented to [facility] ED on 12/26 with concerns for tremors and nausea. Developed brown emesis in ED. Afebrile on arrival but became febrile and hypotensive refractory to IV fluids. Started on vasopressors and central line placed in right internal jugular. Admitted for septic shock and further management/workup.     Hem/onc consulted for evaluation of anemia. Patient denies history of anemia. Denies any noted bleeding from bowel movements or vomit. No hemoptysis.     Mr. Carrasquillo  is a 68 y.o. male we are seeing in consultation for     ICD-10-CM    1. Septic shock (HCC)  A41.9     R65.21              Past Medical History:  Past Medical History:   Diagnosis Date    Diabetes mellitus (HCC)     Hyperlipidemia     Hypertension        Past Surgical History:  Past Surgical History:   Procedure Laterality Date    APPENDECTOMY      COLONOSCOPY      EYE SURGERY Left     lasik    FOOT SURGERY Right 2017    big toe    TOE AMPUTATION Right 3/3/2020    AMPUTATION OF RIGHT GREAT TOE performed by Candi Nixon DPM at [facility] OR       Current Medications:  Current Facility-Administered Medications   Medication Dose Route Frequency Provider Last Rate Last Admin    aspirin EC tablet 81 mg  81 mg Oral Daily Mercedes Nguyen MD   81 mg at 12/27/23 1058    gabapentin (NEURONTIN) capsule 300 mg  300 mg Oral TID Mercedes Nguyen MD   300 mg at 12/27/23 1058    rosuvastatin (CRESTOR) tablet 10 mg  10 mg Oral Daily Mercedes Nguyen MD   10 mg at 12/27/23 1058    sodium chloride flush 0.9 % injection 5-40 mL  5-40 mL IntraVENous 2 times per day  Placement of right IJ line with tip projecting over the SVC otherwise no  significant interval change.  XR FOOT RIGHT (MIN 3 VIEWS)  Narrative: EXAMINATION:  3 XRAY VIEWS OF THE RIGHT FOOT    12/26/2023 11:49 pm    COMPARISON:  None.    HISTORY:  ORDERING SYSTEM PROVIDED HISTORY: Fever, diabetic ulcer  TECHNOLOGIST PROVIDED HISTORY:  Reason for exam:->Fever, diabetic ulcer  Reason for Exam: Fever, diabetic ulcer came to er for Tremors (States \"I've  just had the shakes for two hours.\" Denies any further s/s) and Emesis    FINDINGS:  Pes planus.  Postsurgical changes involving the 1st metatarsal base and  medial cuneiform.  Prior amputation of the 1st toe at the mid proximal  phalanx.  There is no evidence of acute fracture or dislocation.  Arterial  calcifications are present.  Soft tissue swelling with subcutaneous air along  the plantar aspect of the mid foot however there is no evidence of adjacent  osteomyelitis/bony erosion.  Degenerative changes of the midfoot and plantar  calcaneal spur.  Extensive arterial calcifications.  Impression: Ulcer/cellulitis along the plantar aspect of the mid foot however there is no  adjacent bony erosion to suggest osteomyelitis.    Pes planus with postsurgical changes and extensive degenerative changes as  above.  XR CHEST PORTABLE  Narrative: EXAMINATION:  ONE XRAY VIEW OF THE CHEST    12/26/2023 11:33 pm    COMPARISON:  None.    HISTORY:  ORDERING SYSTEM PROVIDED HISTORY: Tachycardic  TECHNOLOGIST PROVIDED HISTORY:  Reason for exam:->Tachycardic  Reason for Exam: Tremors (States \"I've just had the shakes for two hours.\"  Denies any further s/s)    FINDINGS:  The heart is normal.  The pulmonary vessels are slightly prominent centrally.  The lungs are hypoinflated with mild linear densities along the lung bases.  No effusion or consolidation is seen.  Impression: Hypoinflation with mild bibasilar discoid atelectasis or scarring      Problem List  Patient Active Problem List

## 2023-12-28 PROBLEM — E87.1 HYPONATREMIA: Status: ACTIVE | Noted: 2023-12-28

## 2023-12-28 PROBLEM — E66.3 OVERWEIGHT (BMI 25.0-29.9): Status: ACTIVE | Noted: 2023-12-28

## 2023-12-28 PROBLEM — E11.628 TYPE 2 DIABETES MELLITUS WITH RIGHT DIABETIC FOOT INFECTION (HCC): Status: ACTIVE | Noted: 2023-05-01

## 2023-12-28 PROBLEM — L08.9 TYPE 2 DIABETES MELLITUS WITH RIGHT DIABETIC FOOT INFECTION (HCC): Status: ACTIVE | Noted: 2023-05-01

## 2023-12-28 PROBLEM — N17.9 ACUTE RENAL FAILURE (HCC): Status: ACTIVE | Noted: 2023-12-28

## 2023-12-28 LAB
ALBUMIN SERPL-MCNC: 2.5 G/DL (ref 3.4–5)
ALBUMIN SERPL-MCNC: 2.6 G/DL (ref 3.4–5)
ANION GAP SERPL CALCULATED.3IONS-SCNC: 10 MMOL/L (ref 3–16)
ANION GAP SERPL CALCULATED.3IONS-SCNC: 13 MMOL/L (ref 3–16)
BUN SERPL-MCNC: 48 MG/DL (ref 7–20)
BUN SERPL-MCNC: 51 MG/DL (ref 7–20)
CALCIUM SERPL-MCNC: 7.6 MG/DL (ref 8.3–10.6)
CALCIUM SERPL-MCNC: 7.9 MG/DL (ref 8.3–10.6)
CHLORIDE SERPL-SCNC: 100 MMOL/L (ref 99–110)
CHLORIDE SERPL-SCNC: 100 MMOL/L (ref 99–110)
CK SERPL-CCNC: 41 U/L (ref 39–308)
CO2 SERPL-SCNC: 15 MMOL/L (ref 21–32)
CO2 SERPL-SCNC: 17 MMOL/L (ref 21–32)
CREAT SERPL-MCNC: 3.1 MG/DL (ref 0.8–1.3)
CREAT SERPL-MCNC: 3.3 MG/DL (ref 0.8–1.3)
CRP SERPL-MCNC: 194.1 MG/L (ref 0–5.1)
DAT IGG CAPTURE: NORMAL
DEPRECATED RDW RBC AUTO: 13.4 % (ref 12.4–15.4)
EKG ATRIAL RATE: 166 BPM
EKG DIAGNOSIS: NORMAL
EKG Q-T INTERVAL: 292 MS
EKG QRS DURATION: 70 MS
EKG QTC CALCULATION (BAZETT): 479 MS
EKG R AXIS: 21 DEGREES
EKG T AXIS: 36 DEGREES
EKG VENTRICULAR RATE: 162 BPM
FOLATE SERPL-MCNC: 3.42 NG/ML (ref 4.78–24.2)
GFR SERPLBLD CREATININE-BSD FMLA CKD-EPI: 20 ML/MIN/{1.73_M2}
GFR SERPLBLD CREATININE-BSD FMLA CKD-EPI: 21 ML/MIN/{1.73_M2}
GLUCOSE BLD-MCNC: 101 MG/DL (ref 70–99)
GLUCOSE BLD-MCNC: 153 MG/DL (ref 70–99)
GLUCOSE BLD-MCNC: 176 MG/DL (ref 70–99)
GLUCOSE BLD-MCNC: 203 MG/DL (ref 70–99)
GLUCOSE BLD-MCNC: 208 MG/DL (ref 70–99)
GLUCOSE SERPL-MCNC: 118 MG/DL (ref 70–99)
GLUCOSE SERPL-MCNC: 175 MG/DL (ref 70–99)
HAPTOGLOB SERPL-MCNC: 332 MG/DL (ref 30–200)
HCT VFR BLD AUTO: 22.7 % (ref 40.5–52.5)
HGB BLD-MCNC: 7.7 G/DL (ref 13.5–17.5)
LDH SERPL L TO P-CCNC: 269 U/L (ref 100–190)
MCH RBC QN AUTO: 29.2 PG (ref 26–34)
MCHC RBC AUTO-ENTMCNC: 33.9 G/DL (ref 31–36)
MCV RBC AUTO: 86 FL (ref 80–100)
MRSA DNA SPEC QL NAA+PROBE: NORMAL
PERFORMED ON: ABNORMAL
PHOSPHATE SERPL-MCNC: 2.9 MG/DL (ref 2.5–4.9)
PHOSPHATE SERPL-MCNC: 3 MG/DL (ref 2.5–4.9)
PLATELET # BLD AUTO: 182 K/UL (ref 135–450)
PMV BLD AUTO: 7.6 FL (ref 5–10.5)
POTASSIUM SERPL-SCNC: 4 MMOL/L (ref 3.5–5.1)
POTASSIUM SERPL-SCNC: 4.2 MMOL/L (ref 3.5–5.1)
RBC # BLD AUTO: 2.64 M/UL (ref 4.2–5.9)
SODIUM SERPL-SCNC: 127 MMOL/L (ref 136–145)
SODIUM SERPL-SCNC: 128 MMOL/L (ref 136–145)
TROPONIN, HIGH SENSITIVITY: 383 NG/L (ref 0–22)
VANCOMYCIN SERPL-MCNC: 18.5 UG/ML
VIT B12 SERPL-MCNC: 1121 PG/ML (ref 211–911)
WBC # BLD AUTO: 15 K/UL (ref 4–11)

## 2023-12-28 PROCEDURE — 6360000004 HC RX CONTRAST MEDICATION: Performed by: INTERNAL MEDICINE

## 2023-12-28 PROCEDURE — 93308 TTE F-UP OR LMTD: CPT

## 2023-12-28 PROCEDURE — 2000000000 HC ICU R&B

## 2023-12-28 PROCEDURE — 0KBV0ZZ EXCISION OF RIGHT FOOT MUSCLE, OPEN APPROACH: ICD-10-PCS | Performed by: PODIATRIST

## 2023-12-28 PROCEDURE — 99233 SBSQ HOSP IP/OBS HIGH 50: CPT | Performed by: INTERNAL MEDICINE

## 2023-12-28 PROCEDURE — 2500000003 HC RX 250 WO HCPCS: Performed by: NURSE PRACTITIONER

## 2023-12-28 PROCEDURE — 86880 COOMBS TEST DIRECT: CPT

## 2023-12-28 PROCEDURE — 82550 ASSAY OF CK (CPK): CPT

## 2023-12-28 PROCEDURE — 86140 C-REACTIVE PROTEIN: CPT

## 2023-12-28 PROCEDURE — 84484 ASSAY OF TROPONIN QUANT: CPT

## 2023-12-28 PROCEDURE — 99291 CRITICAL CARE FIRST HOUR: CPT | Performed by: INTERNAL MEDICINE

## 2023-12-28 PROCEDURE — 2580000003 HC RX 258: Performed by: INTERNAL MEDICINE

## 2023-12-28 PROCEDURE — 99232 SBSQ HOSP IP/OBS MODERATE 35: CPT | Performed by: INTERNAL MEDICINE

## 2023-12-28 PROCEDURE — 85027 COMPLETE CBC AUTOMATED: CPT

## 2023-12-28 PROCEDURE — 83615 LACTATE (LD) (LDH) ENZYME: CPT

## 2023-12-28 PROCEDURE — 82668 ASSAY OF ERYTHROPOIETIN: CPT

## 2023-12-28 PROCEDURE — 36592 COLLECT BLOOD FROM PICC: CPT

## 2023-12-28 PROCEDURE — 6360000002 HC RX W HCPCS: Performed by: INTERNAL MEDICINE

## 2023-12-28 PROCEDURE — 93005 ELECTROCARDIOGRAM TRACING: CPT | Performed by: INTERNAL MEDICINE

## 2023-12-28 PROCEDURE — 80202 ASSAY OF VANCOMYCIN: CPT

## 2023-12-28 PROCEDURE — 83036 HEMOGLOBIN GLYCOSYLATED A1C: CPT

## 2023-12-28 PROCEDURE — 80069 RENAL FUNCTION PANEL: CPT

## 2023-12-28 PROCEDURE — 6360000002 HC RX W HCPCS: Performed by: STUDENT IN AN ORGANIZED HEALTH CARE EDUCATION/TRAINING PROGRAM

## 2023-12-28 PROCEDURE — 93010 ELECTROCARDIOGRAM REPORT: CPT | Performed by: INTERNAL MEDICINE

## 2023-12-28 PROCEDURE — 6370000000 HC RX 637 (ALT 250 FOR IP): Performed by: STUDENT IN AN ORGANIZED HEALTH CARE EDUCATION/TRAINING PROGRAM

## 2023-12-28 PROCEDURE — 83010 ASSAY OF HAPTOGLOBIN QUANT: CPT

## 2023-12-28 PROCEDURE — 2580000003 HC RX 258: Performed by: STUDENT IN AN ORGANIZED HEALTH CARE EDUCATION/TRAINING PROGRAM

## 2023-12-28 RX ADMIN — ACETAMINOPHEN 650 MG: 325 TABLET ORAL at 21:28

## 2023-12-28 RX ADMIN — INSULIN GLARGINE 13 UNITS: 100 INJECTION, SOLUTION SUBCUTANEOUS at 21:27

## 2023-12-28 RX ADMIN — PERFLUTREN 1.5 ML: 6.52 INJECTION, SUSPENSION INTRAVENOUS at 08:19

## 2023-12-28 RX ADMIN — GABAPENTIN 300 MG: 300 CAPSULE ORAL at 08:28

## 2023-12-28 RX ADMIN — Medication 10 ML: at 21:17

## 2023-12-28 RX ADMIN — INSULIN LISPRO 4 UNITS: 100 INJECTION, SOLUTION INTRAVENOUS; SUBCUTANEOUS at 11:36

## 2023-12-28 RX ADMIN — CEFEPIME 2000 MG: 2 INJECTION, POWDER, FOR SOLUTION INTRAVENOUS at 10:15

## 2023-12-28 RX ADMIN — SODIUM CHLORIDE, POTASSIUM CHLORIDE, SODIUM LACTATE AND CALCIUM CHLORIDE: 600; 310; 30; 20 INJECTION, SOLUTION INTRAVENOUS at 03:40

## 2023-12-28 RX ADMIN — ENOXAPARIN SODIUM 40 MG: 100 INJECTION SUBCUTANEOUS at 08:28

## 2023-12-28 RX ADMIN — DAPTOMYCIN 450 MG: 500 INJECTION, POWDER, LYOPHILIZED, FOR SOLUTION INTRAVENOUS at 21:15

## 2023-12-28 RX ADMIN — CEFEPIME 1000 MG: 1 INJECTION, POWDER, FOR SOLUTION INTRAMUSCULAR; INTRAVENOUS at 22:20

## 2023-12-28 RX ADMIN — GABAPENTIN 300 MG: 300 CAPSULE ORAL at 16:31

## 2023-12-28 RX ADMIN — MUPIROCIN: 20 OINTMENT TOPICAL at 21:17

## 2023-12-28 RX ADMIN — FAMOTIDINE 20 MG: 10 INJECTION, SOLUTION INTRAVENOUS at 08:34

## 2023-12-28 RX ADMIN — MUPIROCIN: 20 OINTMENT TOPICAL at 08:33

## 2023-12-28 RX ADMIN — ASPIRIN 81 MG: 81 TABLET, COATED ORAL at 08:28

## 2023-12-28 RX ADMIN — INSULIN LISPRO 4 UNITS: 100 INJECTION, SOLUTION INTRAVENOUS; SUBCUTANEOUS at 16:35

## 2023-12-28 RX ADMIN — Medication 10 ML: at 08:29

## 2023-12-28 RX ADMIN — INSULIN LISPRO 2 UNITS: 100 INJECTION, SOLUTION INTRAVENOUS; SUBCUTANEOUS at 16:35

## 2023-12-28 RX ADMIN — INSULIN LISPRO 2 UNITS: 100 INJECTION, SOLUTION INTRAVENOUS; SUBCUTANEOUS at 11:35

## 2023-12-28 RX ADMIN — GABAPENTIN 300 MG: 300 CAPSULE ORAL at 21:28

## 2023-12-28 RX ADMIN — ROSUVASTATIN 10 MG: 10 TABLET, FILM COATED ORAL at 08:28

## 2023-12-28 ASSESSMENT — PAIN SCALES - GENERAL
PAINLEVEL_OUTOF10: 0

## 2023-12-28 NOTE — PROGRESS NOTES
I10    Neuropathy G62.9    Hyperlipidemia associated with type 2 diabetes mellitus (HCC) E11.69, E78.5    Septic shock (HCC) A41.9, R65.21    Ulcer of right foot, limited to breakdown of skin (HCC) L97.511    Overweight (BMI 25.0-29.9) E66.3    Hyponatremia E87.1    Acute renal failure (HCC) N17.9       Please note that this chart was generated using Dragon dictation software. Although every effort was made to ensure the accuracy of this automated transcription, some errors in transcription may have occurred inadvertently. If you may need any clarification, please do not hesitate to contact me through EPIC or at the phone number provided below with my electronic signature.  Any pictures or media included in this note were obtained after taking informed verbal consent from the patient and with their approval to include those in the patient's medical record.        Connor Velasquez MD, MPH, FACP, [facility]  12/28/2023, 6:13 PM  [facility]   [address], [address] ([employer] [facility] [address]  Office: [phone]  Fax: [phone]  In-person Clinic days:  Tuesday & Thursday a.m.  Virtual clinic days: Monday, Wednesday & Friday a.m.

## 2023-12-28 NOTE — PROGRESS NOTES
[facility]   Daily Cardiovascular Progress Note    Admit Date: 12/26/2023    Chief complaint: Shaking  HPI:     Pt. denies CP, SOB, dizziness or syncope.        Medications/Labs all Reviewed:  Patient Active Problem List   Diagnosis    Type 2 diabetes mellitus with diabetic polyneuropathy (HCC)    Primary hypertension    Neuropathy    Hyperlipidemia associated with type 2 diabetes mellitus (HCC)    Septic shock (HCC)    Ulcer of right foot, limited to breakdown of skin (HCC)       Medications:  aspirin EC tablet 81 mg, Daily  gabapentin (NEURONTIN) capsule 300 mg, TID  rosuvastatin (CRESTOR) tablet 10 mg, Daily  sodium chloride flush 0.9 % injection 5-40 mL, 2 times per day  sodium chloride flush 0.9 % injection 5-40 mL, PRN  0.9 % sodium chloride infusion, PRN  enoxaparin (LOVENOX) injection 40 mg, Daily  acetaminophen (TYLENOL) tablet 650 mg, Q6H PRN   Or  acetaminophen (TYLENOL) suppository 650 mg, Q6H PRN  lactated ringers IV soln infusion, Continuous  glucose chewable tablet 16 g, PRN  dextrose bolus 10% 125 mL, PRN   Or  dextrose bolus 10% 250 mL, PRN  glucagon injection 1 mg, PRN  dextrose 10 % infusion, Continuous PRN  insulin glargine (LANTUS) injection vial 13 Units, Nightly  insulin lispro (HUMALOG) injection vial 4 Units, TID WC  insulin lispro (HUMALOG) injection vial 0-8 Units, TID WC  insulin lispro (HUMALOG) injection vial 0-4 Units, Nightly  vancomycin (VANCOCIN) intermittent dosing (placeholder), RX Placeholder  mupirocin (BACTROBAN) 2 % ointment, BID  perflutren lipid microspheres (DEFINITY) injection 1.5 mL, ONCE PRN  ceFEPIme (MAXIPIME) 2,000 mg in sodium chloride 0.9 % 100 mL IVPB (mini-bag), Q12H           PHYSICAL EXAM   /67   Pulse 77   Temp 97.1 °F (36.2 °C) (Bladder)   Resp 20   Ht 1.778 m (5' 10\")   Wt 83.6 kg (184 lb 4.9 oz)   SpO2 96%   BMI 26.44 kg/m²    Vitals:    12/28/23 0500 12/28/23 0600 12/28/23 0700 12/28/23 0800   BP: 95/62 (!) 87/63 96/66 103/67

## 2023-12-28 NOTE — PROGRESS NOTES
labs as documented for evidence of drug toxicity.     Infusion Medications    sodium chloride Stopped (12/28/23 1015)    [Held by provider] lactated ringers IV soln Stopped (12/28/23 1057)    dextrose       Scheduled Medications    aspirin EC  81 mg Oral Daily    gabapentin  300 mg Oral TID    rosuvastatin  10 mg Oral Daily    sodium chloride flush  5-40 mL IntraVENous 2 times per day    enoxaparin  40 mg SubCUTAneous Daily    insulin glargine  0.15 Units/kg SubCUTAneous Nightly    insulin lispro  0.05 Units/kg SubCUTAneous TID WC    insulin lispro  0-8 Units SubCUTAneous TID WC    insulin lispro  0-4 Units SubCUTAneous Nightly    vancomycin (VANCOCIN) intermittent dosing (placeholder)   Other RX Placeholder    mupirocin   Each Nostril BID    cefepime  2,000 mg IntraVENous Q12H     PRN Meds: sodium chloride flush, sodium chloride, acetaminophen **OR** acetaminophen, glucose, dextrose bolus **OR** dextrose bolus, glucagon (rDNA), dextrose, perflutren lipid microspheres     Labs:  Personally reviewed and interpreted for clinical significance.     Recent Labs     12/26/23 2300 12/27/23  0603 12/28/23  0353   WBC 3.9* 23.8* 15.0*   HGB 11.2* 9.3* 7.7*   HCT 32.9* 27.8* 22.7*    256 182     Recent Labs     12/27/23  0603 12/27/23  1120 12/28/23  0353   * 128* 127*   K 4.2 4.6 4.0    97* 100   CO2 14* 15* 17*   BUN 38* 41* 48*   CREATININE 2.5* 2.6* 3.1*   CALCIUM 8.1* 7.6* 7.6*   PHOS  --   --  3.0     Recent Labs     12/27/23  0021 12/27/23  0603 12/28/23  0353   TROPHS 208* 435* 383*     No results for input(s): \"LABA1C\" in the last 72 hours.  Recent Labs     12/26/23 2300   AST 22   ALT 18   BILITOT 0.8   ALKPHOS 124     Recent Labs     12/26/23  2300 12/27/23  0021   LACTA 5.6* 2.8*       Urine Cultures: No results found for: \"LABURIN\"  Blood Cultures:   Lab Results   Component Value Date/Time    BC  12/26/2023 11:00 PM     Gram stain Aerobic bottle:  Gram variable rods  Information to  follow  Gram stain Anaerobic bottle:  Gram positive cocci in clusters  resembling Staphylococcus  Information to follow  Organism identification not detected by PCR (Film Array)  See additional report for complete BCID panel  Organism identification not detected by PCR (Film Array)  See additional report for complete BCID panel       Lab Results   Component Value Date/Time    BLOODCULT2  12/26/2023 11:15 PM     No Growth to date.  Any change in status will be called.     Organism: No results found for: \"ORG\"      Garrick Murguia MD

## 2023-12-28 NOTE — PROGRESS NOTES
ONCOLOGY HEMATOLOGY CARE PROGRESS NOTE      SUBJECTIVE:      ROS:     Constitutional:  No weight loss, No fever, No chills, No night sweats.  Energy level good.  Eyes:  No impairment or change in vision  ENT / Mouth:  No pain, abnormal ulceration, bleeding, nasal drip or change in voice or hearing  Cardiovascular:  No chest pain, palpitations, new edema, or calf discomfort  Respiratory:  No pain, hemoptysis, change to breathing  Breast:  No pain, discharge, change in appearance or texture  Gastrointestinal:  No pain, cramping, jaundice, change to eating and bowel habits  Urinary:  No pain, bleeding or change in continence  Genitalia: No pain, bleeding or discharge  Musculoskeletal:  No redness, pain, edema or weakness  Skin:  No pruritus, rash, change to nodules or lesions  Neurologic:  No discomfort, change in mental status, speech, sensory or motor activity  Psychiatric:  No change in concentration or change to affect or mood  Endocrine:  No hot flashes, increased thirst, or change to urine production  Hematologic: No petechiae, ecchymosis or bleeding  Lymphatic:  No lymphadenopathy or lymphedema  Allergy / Immunologic:  No eczema, hives, frequent or recurrent infections    OBJECTIVE        Physical    VITALS:  Patient Vitals for the past 24 hrs:   BP Temp Temp src Pulse Resp SpO2 Weight   12/28/23 0800 103/67 97.1 °F (36.2 °C) Bladder 77 20 96 % --   12/28/23 0700 96/66 -- -- 70 -- -- --   12/28/23 0600 (!) 87/63 -- -- 76 -- -- 83.6 kg (184 lb 4.9 oz)   12/28/23 0500 95/62 -- -- 92 -- -- --   12/28/23 0400 (!) 86/68 98 °F (36.7 °C) Bladder 89 20 99 % --   12/28/23 0300 93/60 -- -- 85 -- -- --   12/28/23 0200 (!) 88/62 -- -- 94 -- -- --   12/28/23 0100 (!) 89/66 -- -- (!) 109 -- -- --   12/28/23 0000 (!) 83/59 97.8 °F (36.6 °C) Bladder (!) 109 18 98 % --   12/27/23 2300 (!) 86/56 -- -- 88 -- -- --   12/27/23 2200 (!) 88/62 -- -- 86 -- -- --   12/27/23 2100 (!) 84/60 -- -- (!)  106 -- -- --   12/27/23 2000 (!) 90/52 97 °F (36.1 °C) Bladder (!) 106 20 100 % --   12/27/23 1900 96/66 -- -- 98 -- -- --   12/27/23 1800 109/73 -- -- 100 -- -- --   12/27/23 1700 92/66 -- -- 89 -- -- --   12/27/23 1600 (!) 84/65 96.9 °F (36.1 °C) CORE 79 21 99 % --   12/27/23 1500 91/63 -- -- 84 -- -- --   12/27/23 1400 95/70 -- -- 84 -- -- --   12/27/23 1300 -- -- -- 86 -- -- --   12/27/23 1200 96/68 97.5 °F (36.4 °C) CORE 80 22 99 % --   12/27/23 1100 98/62 -- -- 85 -- 100 % --   12/27/23 1000 92/67 -- -- 84 21 100 % --   12/27/23 0900 99/84 -- -- 88 21 99 % --       24HR INTAKE/OUTPUT:    Intake/Output Summary (Last 24 hours) at 12/28/2023 0828  Last data filed at 12/28/2023 0800  Gross per 24 hour   Intake 6480.35 ml   Output 837 ml   Net 5643.35 ml       CONSTITUTIONAL: awake, alert, cooperative, no apparent distress   EYES: pupils equal, round and reactive to light, sclera clear and conjunctiva normal  ENT: Normocephalic, without obvious abnormality, atraumatic  NECK: supple, symmetrical, no jugular venous distension and no carotid bruits   HEMATOLOGIC/LYMPHATIC: no cervical, supraclavicular or axillary lymphadenopathy   LUNGS: no increased work of breathing and clear to auscultation   CARDIOVASCULAR: regular rate and rhythm, normal S1 and S2, no murmur noted  ABDOMEN: normal bowel sounds x 4, soft, non-distended, non-tender, no masses palpated, no hepatosplenomgaly   MUSCULOSKELETAL: full range of motion noted, tone is normal  NEUROLOGIC: awake, alert, oriented to name, place and time. Motor skills grossly intact.   SKIN: Normal skin color, texture, turgor and no jaundice. appears intact   EXTREMITIES: no LE edema     DATA:  CBC:    Recent Labs     12/28/23  0353 12/27/23  0603 12/26/23  2300   WBC 15.0* 23.8* 3.9*   NEUTROABS  --  22.4* 3.2   LYMPHOPCT  --  1.7 14.5   RBC 2.64* 3.16* 3.75*   HGB 7.7* 9.3* 11.2*   HCT 22.7* 27.8* 32.9*   MCV 86.0 87.8 87.8   MCH 29.2 29.3 29.9   MCHC 33.9 33.4 34.0   RDW

## 2023-12-28 NOTE — PROGRESS NOTES
12-Lead EKG completed, discussed with Pulm/Critical Care MD Benavides and notified Cardiology. Patient did go into Afib rvr Hr 160's-180's and then self converted to NR on monitor.     Will notify Cardiology and CC team of any changes and if rhythm returns to sustained Afib rvr.      s/p right foot partial second toe amputation:/yes Statement Selected

## 2023-12-28 NOTE — PLAN OF CARE
Problem: Discharge Planning  Goal: Discharge to home or other facility with appropriate resources  12/27/2023 2013 by Mario Garcia, RN  Outcome: Progressing  Flowsheets (Taken 12/27/2023 2000)  Discharge to home or other facility with appropriate resources: Identify barriers to discharge with patient and caregiver     Problem: Safety - Adult  Goal: Free from fall injury  12/27/2023 2013 by Mario Garcia, RN  Outcome: Progressing  Flowsheets (Taken 12/27/2023 2000)  Free From Fall Injury: Instruct family/caregiver on patient safety     Problem: Chronic Conditions and Co-morbidities  Goal: Patient's chronic conditions and co-morbidity symptoms are monitored and maintained or improved  12/28/2023 1003 by Dexter Lomax, RN  Outcome: Progressing  Flowsheets (Taken 12/27/2023 2000 by Mario Garcia, RN)  Care Plan - Patient's Chronic Conditions and Co-Morbidity Symptoms are Monitored and Maintained or Improved: Monitor and assess patient's chronic conditions and comorbid symptoms for stability, deterioration, or improvement

## 2023-12-28 NOTE — PLAN OF CARE
CHF Care Plan      Patient's EF (Ejection Fraction) is less than 40%    Heart Failure Medications:  Diuretics:: None    (One of the following REQUIRED for EF </= 40%/SYSTOLIC FAILURE but MAY be used in EF% >40%/DIASTOLIC FAILURE)        ACE:: Enalapril - Held by MD        ARB:: None         ARNI:: None    (Beta Blockers)  NON- Evidenced Based Beta Blocker (for EF% >40%/DIASTOLIC FAILURE): None    Evidenced Based Beta Blocker::(REQUIRED for EF% <40%/SYSTOLIC FAILURE) None  ...................................................................................................................................................          Patient's weights and intake/output reviewed    Daily Weight log at bedside, patient/family participation in use of log: \"yes    Patient's current weight today is 83.6 kg shows a difference of 8.5 kg less than last documented weight.      Intake/Output Summary (Last 24 hours) at 12/28/2023 1004  Last data filed at 12/28/2023 0917  Gross per 24 hour   Intake 6375.68 ml   Output 948 ml   Net 5427.68 ml         Education Booklet Provided: no    Comorbidities Reviewed Yes    Patient has a past medical history of Diabetes mellitus (HCC), Hyperlipidemia, and Hypertension.     >>For CHF and Comorbidity documentation on Education Time and Topics, please see Education Tab    Progressive Mobility Assessment:  What is this patient's Current Level of Mobility?: Ambulatory- with Assistance  How was this patient Mobilized today?: Edge of Bed, ambulated 0 ft                 With Whom? Self                 Level of Difficulty/Assistance: Independent     Pt sitting in bed at this time on room air. Pt denies shortness of breath. Pt without lower extremity edema.     Patient and/or Family's stated Goal of Care this Admission: reduce shortness of breath, increase activity tolerance, better understand heart failure and disease management, be more comfortable, and reduce lower extremity edema prior to

## 2023-12-28 NOTE — PLAN OF CARE
Problem: Discharge Planning  Goal: Discharge to home or other facility with appropriate resources  Outcome: Progressing  Flowsheets (Taken 12/27/2023 2000)  Discharge to home or other facility with appropriate resources: Identify barriers to discharge with patient and caregiver     Problem: Safety - Adult  Goal: Free from fall injury  Outcome: Progressing  Flowsheets (Taken 12/27/2023 2000)  Free From Fall Injury: Instruct family/caregiver on patient safety     Problem: Chronic Conditions and Co-morbidities  Goal: Patient's chronic conditions and co-morbidity symptoms are monitored and maintained or improved  Outcome: Progressing  Flowsheets (Taken 12/27/2023 2000)  Care Plan - Patient's Chronic Conditions and Co-Morbidity Symptoms are Monitored and Maintained or Improved: Monitor and assess patient's chronic conditions and comorbid symptoms for stability, deterioration, or improvement     Problem: Pain  Goal: Verbalizes/displays adequate comfort level or baseline comfort level  Outcome: Progressing

## 2023-12-28 NOTE — PROGRESS NOTES
PULMONARY AND CRITICAL CARE INPATIENT NOTE        Ant Carrasquillo   : 1955  MRN: [id number]     Admitting Physician: Mercedes Nguyen MD  Attending Physician: Garrick Murguia MD  PCP: Minnie Coelho MD    Admission: 2023   Date of Service: 2023    Chief Complaint   Patient presents with    Tremors     States \"I've just had the shakes for two hours.\" Denies any further s/s    Emesis           ASSESSMENT & PLAN       68 y.o. pleasant  male patient with:    Hospital Problems             Last Modified POA    * (Principal) Septic shock ([facility]) 2023 Yes    Type 2 diabetes mellitus with diabetic polyneuropathy ([facility]) 2023 Yes    Ulcer of right foot, limited to breakdown of skin ([facility]) 2023 Yes          # Septic shock  # Fever and shivering  # Diabetic foot ulcer of right foot.  Previous toe amputation.  Shallow ulcer with cellulitis with no drainable abscess but possible osteitis/osteomyelitis on MRI foot on   # Gram-positive cocci resembling staph in 1 out of 2 blood cultures  # A-fib with RVR  # Mild elevated troponin  # Nausea and vomiting.  Improved  # Acute kidney injury with poor urine output  # Lactic acidosis.  Improved  # Leukocytosis  Patient weaned off vasopressors completely  Continue broad-spectrum antibiotics.  ID consulted.    Cardiology is following for the arrhythmias and troponin  Nephrology is managing acute kidney injury.  No education for dialysis right now  DVT prophylaxis      LOS: Hospital Day: 3    Code:Full Code    We will sign off.  Please let us know if you have any questions.  Thank you for involving us in this patient's care.             Subjective/Objective           CC/Reason for Consult: Septic shock        HPI: 2023  68-year-old male patient with history of diabetes, hypertension, dyslipidemia, diabetic foot ulcer with a previous right toe amputation who was admitted on  with shivering, nausea and found to be septic with  the right foot with warm foot but no clear cellulitis.  No discharge.  Previous toe amputation in the right foot  Neurological: No focal deficit..       ________________________________________________________  Electronically signed by:  Presley Benavides MD,FACP    12/28/2023    8:07 AM.     [facility]  [address], [place] [address]   Phone (office): [phone]

## 2023-12-28 NOTE — PROGRESS NOTES
CHF Care Plan      Patient's EF (Ejection Fraction) is less than 40%    Heart Failure Medications:  Diuretics:: None    (One of the following REQUIRED for EF </= 40%/SYSTOLIC FAILURE but MAY be used in EF% >40%/DIASTOLIC FAILURE)        ACE:: None        ARB:: None         ARNI:: None    (Beta Blockers)  NON- Evidenced Based Beta Blocker (for EF% >40%/DIASTOLIC FAILURE): None    Evidenced Based Beta Blocker::(REQUIRED for EF% <40%/SYSTOLIC FAILURE) None  ...................................................................................................................................................          Patient's weights and intake/output reviewed    Daily Weight log at bedside, patient/family participation in use of log: \"yes    Patient's current weight today shows a difference of 5.9 kg more than last documented weight.      Intake/Output Summary (Last 24 hours) at 12/28/2023 0656  Last data filed at 12/28/2023 0649  Gross per 24 hour   Intake 6799.57 ml   Output 727 ml   Net 6072.57 ml       Education Booklet Provided: no    Comorbidities Reviewed Yes    Patient has a past medical history of Diabetes mellitus (HCC), Hyperlipidemia, and Hypertension.     >>For CHF and Comorbidity documentation on Education Time and Topics, please see Education Tab    Progressive Mobility Assessment:  What is this patient's Current Level of Mobility?: Ambulatory- with Assistance  How was this patient Mobilized today?: Edge of Bed, ambulated 0 ft                 With Whom? Self                 Level of Difficulty/Assistance: Independent     Pt sitting in bed at this time on room air. Pt denies shortness of breath. Pt without lower extremity edema.     Patient and/or Family's stated Goal of Care this Admission: reduce shortness of breath, increase activity tolerance, better understand heart failure and disease management, be more comfortable, and reduce lower extremity edema prior to discharge        :

## 2023-12-28 NOTE — PROGRESS NOTES
Progress note    CC: chills and rigors  Summary:   Ant Carrasquillo is being seen by nephrology for Acute kidney injury (WOLF). He is a 68 y.o. male with a PMH significant for T2DM, hypertesnion, diabetic foot ulcer s/p right foot toe amputation who presented to the ED 12/26/2023 with shaking and nausea. He is noted to have ulcers on his right foot with concern for osteomyelitis.    Interval History  Seen at bedside  /65, not on pressors.  On room air  Made 727 mL urine yesterday  Cr worsening today, 3.1 today from 2.5 yesterday  Bicarb improved to 17  Na down to 127 form 131    Plan:   - UO seems to be improving today but overall Cr is worse today  - at this time would prefer to hold off on cardiac work up until Cr improves.  - since hemodynamically doing better and hyponatremia is worsening and he is 5 L positive will stop IVF today  - repeat labs at 1 pm.-> Cr still trending up at 3.3, Na stable at 128. Likely developed ATN.      Brittany Cevallos MD  [facility]  Office: [phone]    Assessment:   WOLF:  - baseline Cr 0.9-1.2  - Cr on admission was 2.5  - associated with septic shock, hypotension  - UA with protein but otherwise bland  - WOLF 2/2 hemodynamic insult     Acute metabolic acidosis:  - 2/2 WOLF  - will try to manage medically.     Septic shock:  - 2/2 diabetic right foot ulcer  - per primary     Hypertension:  - home regimen: enalapril 10 mg BID  - will hold.     Diabetic foot ulcer:  - per ID, podiatry.        PE:   Vitals:    12/28/23 1000   BP: 105/65   Pulse: (!) 116   Resp: 20   Temp: 97.3 °F (36.3 °C)   SpO2: 95%       General appearance: in NAD  Respiratory: Respiratory effort appears normal, bilateral equal chest rise, no wheeze, no crackles  Cardiovascular: Ausculation shows RRR no edema  Abdomen: No visible mass or tenderness, non distended.

## 2023-12-28 NOTE — PROGRESS NOTES
Notified providers: Cardiology and Nephrology regarding changes in HR, and electrolyte status (respectively).     Nephrology: stopped IVF, will run renal panel later today and continue to monitor hemodynamic status as well as UOP.     Cardiology: will continue to monitor HR and rhythm at this time. No changes at this moment.

## 2023-12-28 NOTE — PROGRESS NOTES
Shift: PM    Admitting diagnosis: Septic shock    Presentation to hospital: to ED c/o shakes & malaise    Surgery: no     Nursing assessment at handoff  stable    Emergency Contact/POA:Meghananahum  Family updated: no    Most recent vitals: BP (!) 87/63   Pulse 76   Temp 98 °F (36.7 °C) (Bladder)   Resp 20   Ht 1.778 m (5' 10\")   Wt 83.6 kg (184 lb 4.9 oz)   SpO2 99%   BMI 26.44 kg/m²      Rhythm: Normal Sinus Rhythm 80s-100s with PVCs and PACs     NC/HFNC-  Room air  Respiratory support: - No ventilator support    Vent days: N/A    Increased O2 requirements: no    Admission weight Weight - Scale: 86.2 kg (190 lb)  Today's weight   Wt Readings from Last 1 Encounters:   12/28/23 83.6 kg (184 lb 4.9 oz)         UOP >30ml/hr: yes      So need assessed each shift: yes    Restraints: no  Order current and documentation up to date? N/A    Lines/Drains  LDA Insertion Date Discontinued Date Dressing Changes   PIV  12/26     TLC  12/27 12/28   Arterial       So  12/27     Vas Cath      ETT       Surgical drains        Night Shift Hospitalist Interventions    Problem(Brief) Date Time Intervention Physician contacted                                               Drip rates at handoff:    sodium chloride 5 mL/hr at 12/28/23 0649    lactated ringers IV soln 100 mL/hr at 12/28/23 0649    dextrose         Hospital Course Daily Updates:  Admit 12/27: c/o shakes at home, malaise/fatigue. Found to be septic with unknown source. Hypotensive, 3L IVF given and levo started. Titrated up to 25, able to be weaned off later in the AM. Hx DM with ulcers on BLE; wound care & ID c/s. MRI R foot showing possible early OM. Hem c/s d/t unexplained anemia; r/t infection. Cardiology c/s d/t elevated troponin, EF on echo 35%. Needs LHC once WOLF subsides. Neph following.  12/28: Kidney function worsening despite improved BP and UO. Big drop in hgb w/o evidence of bleeding.       Lab Data:   CBC:   Recent Labs     12/27/23  0603

## 2023-12-29 ENCOUNTER — ANESTHESIA EVENT (OUTPATIENT)
Dept: OPERATING ROOM | Age: 68
End: 2023-12-29
Payer: MEDICARE

## 2023-12-29 ENCOUNTER — ANESTHESIA (OUTPATIENT)
Dept: OPERATING ROOM | Age: 68
End: 2023-12-29
Payer: MEDICARE

## 2023-12-29 LAB
ALBUMIN SERPL-MCNC: 2.5 G/DL (ref 3.4–5)
ANION GAP SERPL CALCULATED.3IONS-SCNC: 14 MMOL/L (ref 3–16)
BUN SERPL-MCNC: 49 MG/DL (ref 7–20)
CALCIUM SERPL-MCNC: 8.3 MG/DL (ref 8.3–10.6)
CHLORIDE SERPL-SCNC: 100 MMOL/L (ref 99–110)
CO2 SERPL-SCNC: 15 MMOL/L (ref 21–32)
CREAT SERPL-MCNC: 3.4 MG/DL (ref 0.8–1.3)
DEPRECATED RDW RBC AUTO: 13.3 % (ref 12.4–15.4)
EPO SERPL-ACNC: 68 MU/ML (ref 4–27)
EST. AVERAGE GLUCOSE BLD GHB EST-MCNC: 177.2 MG/DL
FERRITIN SERPL IA-MCNC: 434.1 NG/ML (ref 30–400)
GFR SERPLBLD CREATININE-BSD FMLA CKD-EPI: 19 ML/MIN/{1.73_M2}
GLUCOSE BLD-MCNC: 137 MG/DL (ref 70–99)
GLUCOSE BLD-MCNC: 145 MG/DL (ref 70–99)
GLUCOSE BLD-MCNC: 158 MG/DL (ref 70–99)
GLUCOSE BLD-MCNC: 229 MG/DL (ref 70–99)
GLUCOSE SERPL-MCNC: 164 MG/DL (ref 70–99)
HBA1C MFR BLD: 7.8 %
HCT VFR BLD AUTO: 25 % (ref 40.5–52.5)
HGB BLD-MCNC: 8.3 G/DL (ref 13.5–17.5)
IRON SATN MFR SERPL: 9 % (ref 20–50)
IRON SERPL-MCNC: 15 UG/DL (ref 59–158)
MCH RBC QN AUTO: 28.9 PG (ref 26–34)
MCHC RBC AUTO-ENTMCNC: 33.3 G/DL (ref 31–36)
MCV RBC AUTO: 86.7 FL (ref 80–100)
PERFORMED ON: ABNORMAL
PHOSPHATE SERPL-MCNC: 3.2 MG/DL (ref 2.5–4.9)
PLATELET # BLD AUTO: 208 K/UL (ref 135–450)
PMV BLD AUTO: 7.8 FL (ref 5–10.5)
POTASSIUM SERPL-SCNC: 3.9 MMOL/L (ref 3.5–5.1)
RBC # BLD AUTO: 2.88 M/UL (ref 4.2–5.9)
SODIUM SERPL-SCNC: 129 MMOL/L (ref 136–145)
TIBC SERPL-MCNC: 172 UG/DL (ref 260–445)
WBC # BLD AUTO: 15.5 K/UL (ref 4–11)

## 2023-12-29 PROCEDURE — 6370000000 HC RX 637 (ALT 250 FOR IP): Performed by: STUDENT IN AN ORGANIZED HEALTH CARE EDUCATION/TRAINING PROGRAM

## 2023-12-29 PROCEDURE — 6360000002 HC RX W HCPCS: Performed by: INTERNAL MEDICINE

## 2023-12-29 PROCEDURE — 87077 CULTURE AEROBIC IDENTIFY: CPT

## 2023-12-29 PROCEDURE — 87205 SMEAR GRAM STAIN: CPT

## 2023-12-29 PROCEDURE — 80069 RENAL FUNCTION PANEL: CPT

## 2023-12-29 PROCEDURE — 0QBQ0ZX EXCISION OF RIGHT TOE PHALANX, OPEN APPROACH, DIAGNOSTIC: ICD-10-PCS | Performed by: PODIATRIST

## 2023-12-29 PROCEDURE — 88311 DECALCIFY TISSUE: CPT

## 2023-12-29 PROCEDURE — 3600000003 HC SURGERY LEVEL 3 BASE: Performed by: PODIATRIST

## 2023-12-29 PROCEDURE — 2580000003 HC RX 258: Performed by: STUDENT IN AN ORGANIZED HEALTH CARE EDUCATION/TRAINING PROGRAM

## 2023-12-29 PROCEDURE — 3600000013 HC SURGERY LEVEL 3 ADDTL 15MIN: Performed by: PODIATRIST

## 2023-12-29 PROCEDURE — 2580000003 HC RX 258: Performed by: INTERNAL MEDICINE

## 2023-12-29 PROCEDURE — 3700000000 HC ANESTHESIA ATTENDED CARE: Performed by: PODIATRIST

## 2023-12-29 PROCEDURE — 88307 TISSUE EXAM BY PATHOLOGIST: CPT

## 2023-12-29 PROCEDURE — 2500000003 HC RX 250 WO HCPCS: Performed by: NURSE ANESTHETIST, CERTIFIED REGISTERED

## 2023-12-29 PROCEDURE — 7100000001 HC PACU RECOVERY - ADDTL 15 MIN: Performed by: PODIATRIST

## 2023-12-29 PROCEDURE — 2580000003 HC RX 258: Performed by: NURSE ANESTHETIST, CERTIFIED REGISTERED

## 2023-12-29 PROCEDURE — 87075 CULTR BACTERIA EXCEPT BLOOD: CPT

## 2023-12-29 PROCEDURE — 6370000000 HC RX 637 (ALT 250 FOR IP): Performed by: INTERNAL MEDICINE

## 2023-12-29 PROCEDURE — 7100000000 HC PACU RECOVERY - FIRST 15 MIN: Performed by: PODIATRIST

## 2023-12-29 PROCEDURE — 2500000003 HC RX 250 WO HCPCS: Performed by: STUDENT IN AN ORGANIZED HEALTH CARE EDUCATION/TRAINING PROGRAM

## 2023-12-29 PROCEDURE — 6360000002 HC RX W HCPCS: Performed by: NURSE ANESTHETIST, CERTIFIED REGISTERED

## 2023-12-29 PROCEDURE — 2500000003 HC RX 250 WO HCPCS: Performed by: PODIATRIST

## 2023-12-29 PROCEDURE — 99232 SBSQ HOSP IP/OBS MODERATE 35: CPT | Performed by: INTERNAL MEDICINE

## 2023-12-29 PROCEDURE — 87070 CULTURE OTHR SPECIMN AEROBIC: CPT

## 2023-12-29 PROCEDURE — 6370000000 HC RX 637 (ALT 250 FOR IP)

## 2023-12-29 PROCEDURE — 85027 COMPLETE CBC AUTOMATED: CPT

## 2023-12-29 PROCEDURE — 3700000001 HC ADD 15 MINUTES (ANESTHESIA): Performed by: PODIATRIST

## 2023-12-29 PROCEDURE — 6360000002 HC RX W HCPCS: Performed by: PODIATRIST

## 2023-12-29 PROCEDURE — 2060000000 HC ICU INTERMEDIATE R&B

## 2023-12-29 PROCEDURE — 2709999900 HC NON-CHARGEABLE SUPPLY: Performed by: PODIATRIST

## 2023-12-29 RX ORDER — FERROUS SULFATE 325(65) MG
325 TABLET ORAL
Status: DISCONTINUED | OUTPATIENT
Start: 2023-12-30 | End: 2024-01-06

## 2023-12-29 RX ORDER — PROPOFOL 10 MG/ML
INJECTION, EMULSION INTRAVENOUS CONTINUOUS PRN
Status: DISCONTINUED | OUTPATIENT
Start: 2023-12-29 | End: 2023-12-29 | Stop reason: SDUPTHER

## 2023-12-29 RX ORDER — FOLIC ACID 1 MG/1
1 TABLET ORAL DAILY
Status: DISCONTINUED | OUTPATIENT
Start: 2023-12-29 | End: 2024-01-06 | Stop reason: HOSPADM

## 2023-12-29 RX ORDER — OXYCODONE HYDROCHLORIDE 5 MG/1
5 TABLET ORAL PRN
Status: DISCONTINUED | OUTPATIENT
Start: 2023-12-29 | End: 2023-12-29 | Stop reason: HOSPADM

## 2023-12-29 RX ORDER — LABETALOL HYDROCHLORIDE 5 MG/ML
10 INJECTION, SOLUTION INTRAVENOUS
Status: DISCONTINUED | OUTPATIENT
Start: 2023-12-29 | End: 2023-12-29 | Stop reason: HOSPADM

## 2023-12-29 RX ORDER — SODIUM CHLORIDE 0.9 % (FLUSH) 0.9 %
5-40 SYRINGE (ML) INJECTION PRN
Status: DISCONTINUED | OUTPATIENT
Start: 2023-12-29 | End: 2023-12-29 | Stop reason: HOSPADM

## 2023-12-29 RX ORDER — SODIUM CHLORIDE 9 MG/ML
INJECTION, SOLUTION INTRAVENOUS PRN
Status: DISCONTINUED | OUTPATIENT
Start: 2023-12-29 | End: 2023-12-29 | Stop reason: HOSPADM

## 2023-12-29 RX ORDER — FENTANYL CITRATE 50 UG/ML
INJECTION, SOLUTION INTRAMUSCULAR; INTRAVENOUS PRN
Status: DISCONTINUED | OUTPATIENT
Start: 2023-12-29 | End: 2023-12-29 | Stop reason: SDUPTHER

## 2023-12-29 RX ORDER — ONDANSETRON 2 MG/ML
4 INJECTION INTRAMUSCULAR; INTRAVENOUS EVERY 30 MIN PRN
Status: DISCONTINUED | OUTPATIENT
Start: 2023-12-29 | End: 2023-12-29 | Stop reason: HOSPADM

## 2023-12-29 RX ORDER — LIDOCAINE HYDROCHLORIDE 20 MG/ML
INJECTION, SOLUTION INFILTRATION; PERINEURAL PRN
Status: DISCONTINUED | OUTPATIENT
Start: 2023-12-29 | End: 2023-12-29 | Stop reason: SDUPTHER

## 2023-12-29 RX ORDER — SODIUM CHLORIDE 0.9 % (FLUSH) 0.9 %
5-40 SYRINGE (ML) INJECTION EVERY 12 HOURS SCHEDULED
Status: DISCONTINUED | OUTPATIENT
Start: 2023-12-29 | End: 2023-12-29 | Stop reason: HOSPADM

## 2023-12-29 RX ORDER — OXYCODONE HYDROCHLORIDE 5 MG/1
10 TABLET ORAL PRN
Status: DISCONTINUED | OUTPATIENT
Start: 2023-12-29 | End: 2023-12-29 | Stop reason: HOSPADM

## 2023-12-29 RX ORDER — SODIUM CHLORIDE 9 MG/ML
INJECTION, SOLUTION INTRAVENOUS CONTINUOUS PRN
Status: DISCONTINUED | OUTPATIENT
Start: 2023-12-29 | End: 2023-12-29 | Stop reason: SDUPTHER

## 2023-12-29 RX ADMIN — GABAPENTIN 300 MG: 300 CAPSULE ORAL at 08:10

## 2023-12-29 RX ADMIN — ROSUVASTATIN 10 MG: 10 TABLET, FILM COATED ORAL at 08:10

## 2023-12-29 RX ADMIN — LIDOCAINE HYDROCHLORIDE 80 MG: 20 INJECTION, SOLUTION INFILTRATION; PERINEURAL at 17:15

## 2023-12-29 RX ADMIN — FOLIC ACID 1 MG: 1 TABLET ORAL at 09:29

## 2023-12-29 RX ADMIN — GABAPENTIN 300 MG: 300 CAPSULE ORAL at 20:13

## 2023-12-29 RX ADMIN — CEFEPIME 1000 MG: 1 INJECTION, POWDER, FOR SOLUTION INTRAMUSCULAR; INTRAVENOUS at 20:17

## 2023-12-29 RX ADMIN — ASPIRIN 81 MG: 81 TABLET, COATED ORAL at 08:10

## 2023-12-29 RX ADMIN — FENTANYL CITRATE 50 MCG: 50 INJECTION, SOLUTION INTRAMUSCULAR; INTRAVENOUS at 17:22

## 2023-12-29 RX ADMIN — SODIUM BICARBONATE: 84 INJECTION, SOLUTION INTRAVENOUS at 18:49

## 2023-12-29 RX ADMIN — INSULIN GLARGINE 13 UNITS: 100 INJECTION, SOLUTION SUBCUTANEOUS at 20:13

## 2023-12-29 RX ADMIN — MUPIROCIN: 20 OINTMENT TOPICAL at 08:10

## 2023-12-29 RX ADMIN — Medication 10 ML: at 20:13

## 2023-12-29 RX ADMIN — Medication 10 ML: at 08:10

## 2023-12-29 RX ADMIN — CEFEPIME 1000 MG: 1 INJECTION, POWDER, FOR SOLUTION INTRAMUSCULAR; INTRAVENOUS at 08:09

## 2023-12-29 RX ADMIN — PROPOFOL 100 MCG/KG/MIN: 10 INJECTION, EMULSION INTRAVENOUS at 17:15

## 2023-12-29 RX ADMIN — SODIUM CHLORIDE: 9 INJECTION, SOLUTION INTRAVENOUS at 17:15

## 2023-12-29 RX ADMIN — FENTANYL CITRATE 50 MCG: 50 INJECTION, SOLUTION INTRAMUSCULAR; INTRAVENOUS at 17:15

## 2023-12-29 ASSESSMENT — PAIN SCALES - GENERAL
PAINLEVEL_OUTOF10: 0
PAINLEVEL_OUTOF10: 0

## 2023-12-29 ASSESSMENT — PAIN - FUNCTIONAL ASSESSMENT: PAIN_FUNCTIONAL_ASSESSMENT: NONE - DENIES PAIN

## 2023-12-29 NOTE — PROGRESS NOTES
Patient admitted to room 210 from 234.  Patient oriented to room, call light, bed rails, phone, lights and bathroom.  Patient instructed about the schedule of the day including: vital sign frequency, lab draws, possible tests, frequency of MD and staff rounds, including RN/MD rounding together at bedside, daily weights, and I &O's.  Patient instructed about prescribed diet, how to use 8MENU, and television. Bed alarm in place, patient aware of placement and reason. Telemetry box in place, patient aware of placement and reason.  Bed locked, in lowest position, side rails up 2/4, call light within reach.  Will continue to monitor.

## 2023-12-29 NOTE — INTERVAL H&P NOTE
Update History & Physical    The patient's History and Physical of December 27, 2023 was reviewed with the patient and I examined the patient. There was no change. The surgical site was confirmed by the patient and me.     Plan: The risks, benefits, expected outcome, and alternative to the recommended procedure have been discussed with the patient. Patient understands and wants to proceed with the procedure.     Electronically signed by Scout Camp DPM on 12/29/2023 at 5:04 PM

## 2023-12-29 NOTE — PLAN OF CARE
Problem: Discharge Planning  Goal: Discharge to home or other facility with appropriate resources  Outcome: Progressing     Problem: Safety - Adult  Goal: Free from fall injury  Outcome: Progressing     Problem: Chronic Conditions and Co-morbidities  Goal: Patient's chronic conditions and co-morbidity symptoms are monitored and maintained or improved  12/28/2023 2028 by Miquel Montenegro, RN  Outcome: Progressing  12/28/2023 1003 by Dexter Lomax, RN  Outcome: Progressing  Flowsheets (Taken 12/27/2023 2000 by Mario Garcia, RN)  Care Plan - Patient's Chronic Conditions and Co-Morbidity Symptoms are Monitored and Maintained or Improved: Monitor and assess patient's chronic conditions and comorbid symptoms for stability, deterioration, or improvement     Problem: Pain  Goal: Verbalizes/displays adequate comfort level or baseline comfort level  Outcome: Progressing

## 2023-12-29 NOTE — PROGRESS NOTES
Hospital Medicine Progress Note      Date of Admission: 12/26/2023  Hospital Day: 4    Chief Admission Complaint:  Rigors    Subjective:  no new c/o.     Presenting Admission History:         \"68 y.o. male who presented to [facility] with shaking.  PMHx significant for diabetes, hypertension, hyperlipidemia, diabetic foot ulcer, right foot toe amputation.  Patient states around 9 PM yesterday he started to develop significant shaking associated with nausea.  He states that shaking also bad that he was unable to do anything and decided to come to the ER.  Denies any fevers, chest pain, shortness of breath, cough, nausea or diarrhea, foot pain, presyncope or syncope.  Patient states that he has an ulcer on the bottom of his foot which has been draining recently.  He also had 1 episode of vomiting in the ER\"      Assessment/Plan:      Current Principal Problem:  Septic shock (HCC)      Osteomyelitis - clinically suspected w/ diabetic foot infection/ulcer.  Started on Vanco/Cefepime - continued.  Infectious Disease consulted and appreciated. Podiatry consulted and appreciated w/ plan for OR tentatively 29 Dec pending Cardiology clearance.     Sepsis - w/ septic shock requiring central line, pressor support and ICU admission.  2nd to above.     Troponin elevation - c/w myocardial injury.  Likely 2nd to sepsis. Cardiology consulted and appreciated.        [] STEMI/NSTEMI  [x] Trop leak 2nd to ARF  [x] Chronic Myocardial Injury 2nd to CHF  [] Recent MI (with 4 weeks of admission) dated:     of unclear clinical significance w/out signs/sxs of active ischemia.  Documented and reviewed. Echo w/ reduced EF 35-40% dated this admission.  Likely in need of future ischemic w/up.    ARF - w/ elevated BUN/Cr ratio c/w pre-renal azotemia - likely 2nd to hypotension 2nd to sepsis. Given IVF hydration and follow serial labs - documented and reviewed.  Nephrology consulted and appreciated. Follow serial labs w/out evidence of ABX

## 2023-12-29 NOTE — BRIEF OP NOTE
Brief Postoperative Note      Patient: Ant Carrasquillo  YOB: 1955  MRN: [id number]    Date of Procedure: 12/29/2023    Pre-Op Diagnosis Codes:     * Osteomyelitis of right foot, unspecified type (HCC) [M86.9]    Post-Op Diagnosis: Same       Procedure(s):  FOOT DEBRIDEMENT TO BONE WITH BONE BIOPSY  INCISION AND DRAINAGE RIGHT FOOT    Surgeon(s):  [place] DPM    Assistant:  Surgical Assistant: Lenore Mg    Anesthesia: Monitor Anesthesia Care    Estimated Blood Loss (mL): Minimal    Complications: None    Specimens:   ID Type Source Tests Collected by Time Destination   A : RIGHT MID FOOT BONE Specimen Toe SURGICAL PATHOLOGY, CULTURE, SURGICAL Scout Camp DPM 12/29/2023 1727    B : RIGHT HALLUX Specimen Foot SURGICAL PATHOLOGY, CULTURE, SURGICAL Scout Camp DPM 12/29/2023 1732        Implants:  * No implants in log *      Drains:   Urinary Catheter 12/27/23 So-Temperature (Active)   $ Urethral catheter insertion $ Not inserted for procedure 12/27/23 0615   Catheter Indications Urinary retention (acute or chronic), continuous bladder irrigation or bladder outlet obstruction 12/29/23 1000   Site Assessment Pink;No urethral drainage 12/29/23 1000   Urine Color Yellow 12/29/23 1000   Urine Appearance Clear 12/29/23 1000   Urine Odor Malodorous 12/29/23 1000   Collection Container Standard 12/29/23 1000   Securement Method Securing device (Describe) 12/29/23 1000   Catheter Care  Perineal wipes 12/29/23 1443   Catheter Best Practices  Drainage tube clipped to bed;Catheter secured to thigh;Tamper seal intact;Bag below bladder;Bag not on floor;Lack of dependent loop in tubing;Drainage bag less than half full 12/29/23 1000   Status Draining;Patent 12/29/23 1000   Output (mL) 900 mL 12/29/23 1145       Findings: Soft bone medial cuneiform and hallux on right foot. Excisional debridement of midfoot ulcer and hallux. Biopsy of bone taken from each site. No deep space infection

## 2023-12-29 NOTE — PROGRESS NOTES
Patient is intermediate to high risk from cardiac standpoint if general anesthesia is used for planned podiatry procedure.  However, no further cardiac workup or intervention is needed at this time before this planned procedure.    Full daily progress note to follow.    Please call with questions.    Jalen Kaufman MD, FACC, [facility]  Interventional Cardiology  [facility]  [phone] (c)

## 2023-12-29 NOTE — PLAN OF CARE
Patient did not state, in pre-op, any family to call or present that he wished for me to speak with after surgery.    DAGMAR SánchezM

## 2023-12-29 NOTE — PLAN OF CARE
Problem: Discharge Planning  Goal: Discharge to home or other facility with appropriate resources  12/29/2023 1015 by Quiana Rodriguez RN  Outcome: Progressing     Problem: Safety - Adult  Goal: Free from fall injury  12/29/2023 1015 by Quiana Rodriguez RN  Outcome: Progressing     Problem: Chronic Conditions and Co-morbidities  Goal: Patient's chronic conditions and co-morbidity symptoms are monitored and maintained or improved  12/29/2023 1015 by Quiana Rodriguez RN  Outcome: Progressing     Problem: Pain  Goal: Verbalizes/displays adequate comfort level or baseline comfort level  12/29/2023 1015 by Quiana Rodriguez RN  Outcome: Progressing

## 2023-12-29 NOTE — CONSULTS
Protective sensation is grossly diminished to light touch at the level of the toes, bilateral.  Vascular:  DP and PT pulses are palpable, bilateral.  CFT is brisk to all toes.  No significant edema appreciated.  Musculoskeletal:  Patient has 5/5 strength on inversion/everison/dorsiflexion/plantarflexion, bilateral.  No gross musculoskeletal deformities noted.    Labs:  CBC with Differential:    Lab Results   Component Value Date/Time    WBC 15.0 12/28/2023 03:53 AM    RBC 2.64 12/28/2023 03:53 AM    HGB 7.7 12/28/2023 03:53 AM    HCT 22.7 12/28/2023 03:53 AM     12/28/2023 03:53 AM    MCV 86.0 12/28/2023 03:53 AM    MCH 29.2 12/28/2023 03:53 AM    MCHC 33.9 12/28/2023 03:53 AM    RDW 13.4 12/28/2023 03:53 AM    LYMPHOPCT 1.7 12/27/2023 06:03 AM    MONOPCT 3.7 12/27/2023 06:03 AM    EOSPCT 1.3 08/11/2022 12:00 AM    BASOPCT 0.3 12/27/2023 06:03 AM    MONOSABS 0.9 12/27/2023 06:03 AM    LYMPHSABS 0.4 12/27/2023 06:03 AM    EOSABS 0.0 12/27/2023 06:03 AM    BASOSABS 0.1 12/27/2023 06:03 AM     CMP:    Lab Results   Component Value Date/Time     12/28/2023 03:20 PM    K 4.2 12/28/2023 03:20 PM    K 4.2 12/27/2023 06:03 AM     12/28/2023 03:20 PM    CO2 15 12/28/2023 03:20 PM    BUN 51 12/28/2023 03:20 PM    CREATININE 3.3 12/28/2023 03:20 PM    AGRATIO 0.8 12/26/2023 11:00 PM    LABGLOM 20 12/28/2023 03:20 PM    LABGLOM 83 04/04/2023 12:00 AM    GLUCOSE 175 12/28/2023 03:20 PM    PROT 7.6 12/26/2023 11:00 PM    LABALBU 2.6 12/28/2023 03:20 PM    CALCIUM 7.9 12/28/2023 03:20 PM    BILITOT 0.8 12/26/2023 11:00 PM    ALKPHOS 124 12/26/2023 11:00 PM    AST 22 12/26/2023 11:00 PM    ALT 18 12/26/2023 11:00 PM     PT/INR:  No results found for: \"PROTIME\", \"INR\"  HgBA1c:    Lab Results   Component Value Date/Time    LABA1C 7.0 11/01/2023 10:12 AM       Imaging:   MRI FOOT RIGHT W WO CONTRAST   Final Result   1. Shallow soft tissue ulcerations plantar to the medial cuneiform and distal   aspect of the 1st  proximal phalanx with underlying subcutaneous edema   compatible with cellulitis.  No drainable fluid collection identified.   2. Mild marrow edema in the distal aspect of the 1st proximal phalanx   compatible with noninfectious reactive osteitis versus early osteomyelitis.   3. Significant marrow edema throughout the medial cuneiform partially   obscured by susceptibility artifact. Differential diagnosis includes   osteomyelitis versus edema related to postoperative and degenerative changes.   4. Status post 1st tarsometatarsal arthrodesis. Severe degenerative changes   of the joint without significant osseous fusion identified.         XR CHEST PORTABLE   Final Result   Placement of right IJ line with tip projecting over the SVC otherwise no   significant interval change.         XR FOOT RIGHT (MIN 3 VIEWS)   Final Result   Ulcer/cellulitis along the plantar aspect of the mid foot however there is no   adjacent bony erosion to suggest osteomyelitis.      Pes planus with postsurgical changes and extensive degenerative changes as   above.         XR CHEST PORTABLE   Final Result   Hypoinflation with mild bibasilar discoid atelectasis or scarring             Impression/Recommendations:      The patient is a pleasant 68 y.o. male with:      - Cellulitis right foot, complicated by DM with neuropathy   - E&M.   - Imaging: MRI personally reviewed. Labs and MRI suspicious for osteomyelitis of the plantar medial cuneiform. With hardware present in the area, difficult to establish.   - Procedure(s) performed: After verbal consent was received from the patient or POA, the ulcer was excisionally debrided down to and including the level of muscle/fascia, utilizing a sterile 15 blade. All devitalized tissue was debrided until healthy bleeding tissue was noted. After debridement, the site was irrigated with sterile saline. Hemostasis was obtained. The patient tolerated procedure well.   - Dressings: Ulcer site covered with

## 2023-12-29 NOTE — PROGRESS NOTES
Patient arrived to PACU bay 9, phase one initiated. Placed on bedside monitor, VSS. Report obtained from OR RN and anesthesia. Patient on O2 via nasal cannula at 2lpm. See flowsheets for assessment. Side rails in place, will monitor patient closely.

## 2023-12-29 NOTE — PROGRESS NOTES
Progress note    CC: chills and rigors  Summary:   Ant Carrasquillo is being seen by nephrology for Acute kidney injury (WOLF). He is a 68 y.o. male with a PMH significant for T2DM, hypertesnion, diabetic foot ulcer s/p right foot toe amputation who presented to the ED 12/26/2023 with shaking and nausea. He is noted to have ulcers on his right foot with concern for osteomyelitis.    Interval History  Seen at bedside  /84  On room air  Made 3620 mL urine yesterday, quite a robust improvement.  Cr I still trending up but seeming to plateau, 3.4 today  Bicarb dropped to 15  Na 129, overall stable.    Plan:   - evolving ATN. UO improving so seems to be in the polyuric phase of ATN  - expect Cr to begin trending down  - will give bicarb infusion today to address the acidosis  - continue to monitor UO  - to OR today for goot debridement with bone biopsy      Brittany Cevallos MD  [facility]ph[facility]  Office: [phone]    Assessment:   WOLF:  - baseline Cr 0.9-1.2  - Cr on admission was 2.5  - associated with septic shock, hypotension  - UA with protein but otherwise bland  - WOLF 2/2 hemodynamic insult     Acute metabolic acidosis:  - 2/2 WOLF  - will try to manage medically.     Septic shock:  - 2/2 diabetic right foot ulcer  - per primary     Hypertension:  - home regimen: enalapril 10 mg BID  - will hold.     Diabetic foot ulcer:  - per ID, podiatry.        PE:   Vitals:    12/29/23 1621   BP: 138/84   Pulse: 84   Resp: 18   Temp: 97.2 °F (36.2 °C)   SpO2: 98%       General appearance: in NAD  Respiratory: Respiratory effort appears normal, bilateral equal chest rise, no wheeze, no crackles  Cardiovascular: Ausculation shows RRR no edema  Abdomen: No visible mass or tenderness, non distended.

## 2023-12-29 NOTE — PROGRESS NOTES
ONCOLOGY HEMATOLOGY CARE PROGRESS NOTE      SUBJECTIVE:    Ant denies new concerns today. Denies pain.     ROS:     Constitutional:  No weight loss, No fever, No chills, No night sweats.  Energy level good.  Eyes:  No impairment or change in vision  ENT / Mouth:  No pain, abnormal ulceration, bleeding, nasal drip or change in voice or hearing  Cardiovascular:  No chest pain, palpitations, new edema, or calf discomfort  Respiratory:  No pain, hemoptysis, change to breathing  Breast:  No pain, discharge, change in appearance or texture  Gastrointestinal:  No pain, cramping, jaundice, change to eating and bowel habits  Urinary:  No pain, bleeding or change in continence  Genitalia: No pain, bleeding or discharge  Musculoskeletal:  No redness, pain, edema or weakness  Skin:  No pruritus, rash, change to nodules or lesions  Neurologic:  No discomfort, change in mental status, speech, sensory or motor activity  Psychiatric:  No change in concentration or change to affect or mood  Endocrine:  No hot flashes, increased thirst, or change to urine production  Hematologic: No petechiae, ecchymosis or bleeding  Lymphatic:  No lymphadenopathy or lymphedema  Allergy / Immunologic:  No eczema, hives, frequent or recurrent infections    OBJECTIVE        Physical    VITALS:  Patient Vitals for the past 24 hrs:   BP Temp Temp src Pulse Resp SpO2 Weight   12/29/23 0800 124/76 97.3 °F (36.3 °C) Bladder 85 16 98 % --   12/29/23 0700 106/74 -- -- 80 -- 95 % --   12/29/23 0600 124/73 -- -- 80 -- 96 % --   12/29/23 0500 120/73 -- -- 83 -- 98 % --   12/29/23 0416 -- -- -- -- -- -- 98.5 kg (217 lb 2.5 oz)   12/29/23 0400 104/62 97.9 °F (36.6 °C) Bladder 83 18 96 % --   12/29/23 0300 116/72 -- -- 88 -- 95 % --   12/29/23 0200 126/73 -- -- 89 -- 100 % --   12/29/23 0100 114/66 -- -- 93 -- 94 % --   12/29/23 0000 125/70 98.7 °F (37.1 °C) Bladder 86 16 -- --   12/28/23 2300 126/74 -- -- 86 -- 96 % --    EXTREMITIES: no LE edema     DATA:  CBC:    Recent Labs     12/29/23  0510 12/28/23  0353 12/27/23  0603 12/26/23  2300   WBC 15.5* 15.0* 23.8* 3.9*   NEUTROABS  --   --  22.4* 3.2   LYMPHOPCT  --   --  1.7 14.5   RBC 2.88* 2.64* 3.16* 3.75*   HGB 8.3* 7.7* 9.3* 11.2*   HCT 25.0* 22.7* 27.8* 32.9*   MCV 86.7 86.0 87.8 87.8   MCH 28.9 29.2 29.3 29.9   MCHC 33.3 33.9 33.4 34.0   RDW 13.3 13.4 12.8 12.9    182 256 310       PT/INR:    Recent Labs     12/26/23  2300   PROT 7.6     PTT:  No results for input(s): \"APTT\" in the last 720 hours.    CMP:    Recent Labs     12/29/23  0510 12/28/23  1520 12/28/23  0353 12/27/23  1120 12/27/23  0603 12/26/23  2300   * 128* 127* 128*   < > 132*   K 3.9 4.2 4.0 4.6   < > 4.6    100 100 97*   < > 96*   CO2 15* 15* 17* 15*   < > 16*   GLUCOSE 164* 175* 118* 215*   < > 153*   BUN 49* 51* 48* 41*   < > 37*   CREATININE 3.4* 3.3* 3.1* 2.6*   < > 2.2*   LABGLOM 19* 20* 21* 26*   < > 32*   CALCIUM 8.3 7.9* 7.6* 7.6*   < > 9.3   PROT  --   --   --   --   --  7.6   LABALBU 2.5* 2.6* 2.5*  --   --  3.4   AGRATIO  --   --   --   --   --  0.8*   BILITOT  --   --   --   --   --  0.8   ALKPHOS  --   --   --   --   --  124   ALT  --   --   --   --   --  18   AST  --   --   --   --   --  22    < > = values in this interval not displayed.       Lab Results   Component Value Date    CALCIUM 8.3 12/29/2023    PHOS 3.2 12/29/2023       LDH:  Recent Labs     12/28/23  0853   *       Radiology Review:  MRI FOOT RIGHT W WO CONTRAST  Narrative: EXAMINATION:  MRI OF THE RIGHT FOOT WITHOUT AND WITH CONTRAST, 12/27/2023 3:12 pm    TECHNIQUE:  Multiplanar multisequence MRI of the right foot was performed without and  with the administration of intravenous contrast.    COMPARISON:  Right foot radiographs 12/27/2023.    HISTORY:  ORDERING SYSTEM PROVIDED HISTORY: osteomyelitis  TECHNOLOGIST PROVIDED HISTORY:  Reason for exam:->osteomyelitis    FINDINGS:  LISFRANC JOINT:  Lisfranc

## 2023-12-30 LAB
ALBUMIN SERPL-MCNC: 2.3 G/DL (ref 3.4–5)
ANION GAP SERPL CALCULATED.3IONS-SCNC: 12 MMOL/L (ref 3–16)
BUN SERPL-MCNC: 48 MG/DL (ref 7–20)
CALCIUM SERPL-MCNC: 8.5 MG/DL (ref 8.3–10.6)
CHLORIDE SERPL-SCNC: 106 MMOL/L (ref 99–110)
CO2 SERPL-SCNC: 19 MMOL/L (ref 21–32)
CREAT SERPL-MCNC: 3.2 MG/DL (ref 0.8–1.3)
DEPRECATED RDW RBC AUTO: 13.5 % (ref 12.4–15.4)
GFR SERPLBLD CREATININE-BSD FMLA CKD-EPI: 20 ML/MIN/{1.73_M2}
GLUCOSE BLD-MCNC: 190 MG/DL (ref 70–99)
GLUCOSE BLD-MCNC: 236 MG/DL (ref 70–99)
GLUCOSE BLD-MCNC: 238 MG/DL (ref 70–99)
GLUCOSE BLD-MCNC: 239 MG/DL (ref 70–99)
GLUCOSE SERPL-MCNC: 262 MG/DL (ref 70–99)
HCT VFR BLD AUTO: 26.4 % (ref 40.5–52.5)
HGB BLD-MCNC: 8.8 G/DL (ref 13.5–17.5)
MCH RBC QN AUTO: 29 PG (ref 26–34)
MCHC RBC AUTO-ENTMCNC: 33.4 G/DL (ref 31–36)
MCV RBC AUTO: 86.7 FL (ref 80–100)
PERFORMED ON: ABNORMAL
PHOSPHATE SERPL-MCNC: 3.2 MG/DL (ref 2.5–4.9)
PLATELET # BLD AUTO: 231 K/UL (ref 135–450)
PMV BLD AUTO: 7.8 FL (ref 5–10.5)
POTASSIUM SERPL-SCNC: 3.9 MMOL/L (ref 3.5–5.1)
RBC # BLD AUTO: 3.05 M/UL (ref 4.2–5.9)
SODIUM SERPL-SCNC: 137 MMOL/L (ref 136–145)
WBC # BLD AUTO: 11.1 K/UL (ref 4–11)

## 2023-12-30 PROCEDURE — 2580000003 HC RX 258: Performed by: INTERNAL MEDICINE

## 2023-12-30 PROCEDURE — 85027 COMPLETE CBC AUTOMATED: CPT

## 2023-12-30 PROCEDURE — 36415 COLL VENOUS BLD VENIPUNCTURE: CPT

## 2023-12-30 PROCEDURE — 6360000002 HC RX W HCPCS: Performed by: INTERNAL MEDICINE

## 2023-12-30 PROCEDURE — 2500000003 HC RX 250 WO HCPCS: Performed by: STUDENT IN AN ORGANIZED HEALTH CARE EDUCATION/TRAINING PROGRAM

## 2023-12-30 PROCEDURE — 97530 THERAPEUTIC ACTIVITIES: CPT

## 2023-12-30 PROCEDURE — 80069 RENAL FUNCTION PANEL: CPT

## 2023-12-30 PROCEDURE — 6370000000 HC RX 637 (ALT 250 FOR IP): Performed by: INTERNAL MEDICINE

## 2023-12-30 PROCEDURE — 99233 SBSQ HOSP IP/OBS HIGH 50: CPT | Performed by: INTERNAL MEDICINE

## 2023-12-30 PROCEDURE — 2580000003 HC RX 258: Performed by: STUDENT IN AN ORGANIZED HEALTH CARE EDUCATION/TRAINING PROGRAM

## 2023-12-30 PROCEDURE — 6370000000 HC RX 637 (ALT 250 FOR IP)

## 2023-12-30 PROCEDURE — 2060000000 HC ICU INTERMEDIATE R&B

## 2023-12-30 PROCEDURE — 97166 OT EVAL MOD COMPLEX 45 MIN: CPT

## 2023-12-30 PROCEDURE — 97162 PT EVAL MOD COMPLEX 30 MIN: CPT

## 2023-12-30 RX ORDER — OXYCODONE HYDROCHLORIDE AND ACETAMINOPHEN 5; 325 MG/1; MG/1
1 TABLET ORAL EVERY 4 HOURS PRN
Status: DISCONTINUED | OUTPATIENT
Start: 2023-12-30 | End: 2024-01-06 | Stop reason: HOSPADM

## 2023-12-30 RX ADMIN — ROSUVASTATIN 10 MG: 10 TABLET, FILM COATED ORAL at 09:28

## 2023-12-30 RX ADMIN — ASPIRIN 81 MG: 81 TABLET, COATED ORAL at 09:28

## 2023-12-30 RX ADMIN — CEFEPIME 1000 MG: 1 INJECTION, POWDER, FOR SOLUTION INTRAMUSCULAR; INTRAVENOUS at 10:44

## 2023-12-30 RX ADMIN — FERROUS SULFATE TAB 325 MG (65 MG ELEMENTAL FE) 325 MG: 325 (65 FE) TAB at 09:28

## 2023-12-30 RX ADMIN — INSULIN LISPRO 2 UNITS: 100 INJECTION, SOLUTION INTRAVENOUS; SUBCUTANEOUS at 12:29

## 2023-12-30 RX ADMIN — GABAPENTIN 300 MG: 300 CAPSULE ORAL at 09:28

## 2023-12-30 RX ADMIN — GABAPENTIN 300 MG: 300 CAPSULE ORAL at 15:49

## 2023-12-30 RX ADMIN — SODIUM BICARBONATE: 84 INJECTION, SOLUTION INTRAVENOUS at 05:29

## 2023-12-30 RX ADMIN — CEFEPIME 1000 MG: 1 INJECTION, POWDER, FOR SOLUTION INTRAMUSCULAR; INTRAVENOUS at 23:57

## 2023-12-30 RX ADMIN — INSULIN GLARGINE 13 UNITS: 100 INJECTION, SOLUTION SUBCUTANEOUS at 21:52

## 2023-12-30 RX ADMIN — ENOXAPARIN SODIUM 40 MG: 100 INJECTION SUBCUTANEOUS at 09:28

## 2023-12-30 RX ADMIN — SODIUM CHLORIDE: 9 INJECTION, SOLUTION INTRAVENOUS at 23:55

## 2023-12-30 RX ADMIN — FOLIC ACID 1 MG: 1 TABLET ORAL at 09:28

## 2023-12-30 RX ADMIN — GABAPENTIN 300 MG: 300 CAPSULE ORAL at 21:51

## 2023-12-30 RX ADMIN — INSULIN LISPRO 2 UNITS: 100 INJECTION, SOLUTION INTRAVENOUS; SUBCUTANEOUS at 09:28

## 2023-12-30 RX ADMIN — DAPTOMYCIN 450 MG: 500 INJECTION, POWDER, LYOPHILIZED, FOR SOLUTION INTRAVENOUS at 22:00

## 2023-12-30 RX ADMIN — INSULIN LISPRO 4 UNITS: 100 INJECTION, SOLUTION INTRAVENOUS; SUBCUTANEOUS at 09:29

## 2023-12-30 RX ADMIN — INSULIN LISPRO 4 UNITS: 100 INJECTION, SOLUTION INTRAVENOUS; SUBCUTANEOUS at 12:29

## 2023-12-30 RX ADMIN — SODIUM CHLORIDE: 9 INJECTION, SOLUTION INTRAVENOUS at 21:59

## 2023-12-30 NOTE — PROGRESS NOTES
Order for Avasure placed at this time d/t pt pulling out IJ, constantly taking off his telemetry, and his increasing confusing.  Writer asked pt where he was and he stated he was at his house.  RN attempted to notify pt's wife and was unable to get a hold of her.

## 2023-12-30 NOTE — PROGRESS NOTES
Electrophysiology Progress Note     Admit Date: 2023     Reason for follow up: Cardiomyopathy    Interval History:   - Patient seen and examined.   - Clinical notes reviewed.   - Telemetry reviewed.  No recurrent atrial fibrillation.  - No new complaints today.   - No major events overnight.   - Denies having chest pain, shortness of breath, dyspnea on exertion, Orthopnea, PND at the time of this visit.    Physical Examination:  Vitals:    23 1213   BP: 132/83   Pulse: 88   Resp: 18   Temp: 99.5 °F (37.5 °C)   SpO2: 97%        Intake/Output Summary (Last 24 hours) at 2023 1414  Last data filed at 2023 0650  Gross per 24 hour   Intake 370 ml   Output 1400 ml   Net -1030 ml     In: 395 [P.O.:145; I.V.:250]  Out: 2800    Wt Readings from Last 3 Encounters:   23 98.5 kg (217 lb 2.5 oz)   23 95.3 kg (210 lb)   23 96.6 kg (213 lb)     Temp  Av °F (36.7 °C)  Min: 97.2 °F (36.2 °C)  Max: 99.5 °F (37.5 °C)  Pulse  Av.4  Min: 77  Max: 97  BP  Min: 101/66  Max: 146/94  SpO2  Av.3 %  Min: 93 %  Max: 100 %    Telemetry: Sinus rhythm with PACs.  Constitutional: Alert. Oriented to person, place, and time. No distress.   Head: Normocephalic and atraumatic.   Mouth/Throat: Lips appear moist. Oropharynx is clear and moist.  Neck: Neck supple. No lymphadenopathy. No rigidity. No JVD present.   Cardiovascular: Normal rate, regular rhythm. Normal S1&S2.   Pulmonary/Chest: Bilateral respiratory sounds present. No respiratory accessory muscle use.  No wheezes, No rhonchi.    Abdominal: Soft. Normal bowel sounds present. No distension, No tenderness. No splenomegaly. No hernia.  Musculoskeletal: No tenderness. No edema    Neurological: Alert and oriented. Cranial nerve II-XII grossly intact.  Skin: Skin is warm and dry. No rash, lesions, ulcerations noted.  Psychiatric: No anxiety nor agitation.    Labs, diagnostic and imaging results reviewed.   Reviewed.   Recent Labs      Continue telemetry.  - Follow up with EP as outpatient.  - Four week cardiac monitor at time of discharge.    Diabetic foot.  - Per primary team.    Diabetes mellitus type II.  - Per primary team.    Thank you for allowing me to participate in the care of this patient. If you have any questions, please do not hesitate to contact me.    Keyon Junior MD  Cardiac Electrophysiology  [facility]  [phone] [facility]

## 2023-12-30 NOTE — PROGRESS NOTES
Hospital Medicine Progress Note      Date of Admission: 12/26/2023  Hospital Day: 5    Chief Admission Complaint:  rigors     Subjective: He is sitting up in bed, in no acute distress.  He denies any chest pain or shortness of breath at this time.  No other complaint    Presenting Admission History:       68 y.o. male who presented to [facility] with shaking.  PMHx significant for diabetes, hypertension, hyperlipidemia, diabetic foot ulcer, right foot toe amputation.  Patient states around 9 PM yesterday he started to develop significant shaking associated with nausea.  He states that shaking also bad that he was unable to do anything and decided to come to the ER.    Patient states that he has an ulcer on the bottom of his foot which has been draining recently.      He has been seen by podiatry, cardiology, ID and nephrology in consultation.  He did go on 12/29/23 for foot debridement to the bone with bone biopsy, incision and drainage of right foot.  He is receiving IV antibiotics    Assessment/Plan:      Current Principal Problem:  Septic shock (HCC)    Osteomyelitis - clinically suspected w/ diabetic foot infection/ulcer..  Infectious Disease consulted and appreciated.   He is receiving IV antibiotics, daptomycin and cefepime.  Podiatry consulted and appreciated.  He did go to OR on  29 Dec.  For right foot foot debridement to the bone with bone biopsy and incision and drainage.       Sepsis - w/ septic shock requiring central line, pressor support and ICU admission.  2nd to above.  Hemodynamically he is improving.     Troponin elevation - c/w myocardial injury.  Likely 2nd to sepsis. Cardiology consulted and appreciated.                                            [] STEMI/NSTEMI  [x] Trop leak 2nd to ARF  [x] Chronic Myocardial Injury 2nd to CHF  [] Recent MI (with 4 weeks of admission) dated:      of unclear clinical significance w/out signs/sxs of active ischemia.  Documented and reviewed. Echo w/  Array)  See additional report for complete BCID panel  Organism identification not detected by PCR (Film Array)  See additional report for complete BCID panel      BC  12/26/2023 11:00 PM     POSITIVE for  Sensitivity to follow  Isolated one of two sets       Lab Results   Component Value Date/Time    BLOODCULT2  12/26/2023 11:15 PM     Gram stain Anaerobic bottle:  Gram variable rods  Information to follow       Organism:   Lab Results   Component Value Date/Time    ORG Gram negative ilana 12/26/2023 11:00 PM         ELIZABETH ENGEL MD

## 2023-12-30 NOTE — PROGRESS NOTES
RN entered room. Pt pulled IJ and RFA IV out. Minimal bleeding at IJ site. Pt stated he pulled them out due to itching. RN educated pt on importance of leaving IV access in. Pt verbalized understanding. Pt is alert and oriented at this time. Gauze and tegaderm placed at IJ site. Bleeding controlled. 2 new Ivs placed by this RN.

## 2023-12-30 NOTE — PROGRESS NOTES
Progress note    CC: chills and rigors  Summary:   Ant Carrasquillo is being seen by nephrology for Acute kidney injury (WOLF). He is a 68 y.o. male with a PMH significant for T2DM, hypertesnion, diabetic foot ulcer s/p right foot toe amputation who presented to the ED 12/26/2023 with shaking and nausea. He is noted to have ulcers on his right foot with concern for osteomyelitis.    Interval History  Seen at bedside  /83  On room air  Made 2800 mL urine yesterday without diuretics, quite a robust improvement.  Cr now beginning to trend down, 3.2 today  Bicarb improved to 19 today  Na 137 today.  Went to OR for foot debridement 12/29/2023 with podiatry    Plan:   - evolving ATN. UO improving. Cr beginning to trend down today  - stop bicarb infusion today  - continue to monitor UO  - continue supportive care      Brittany Cevallos MD  [facility]  Office: [phone]    Assessment:   WOLF:  - baseline Cr 0.9-1.2  - Cr on admission was 2.5  - associated with septic shock, hypotension  - UA with protein but otherwise bland  - WOLF 2/2 hemodynamic insult     Acute metabolic acidosis:  - 2/2 WOLF  - will try to manage medically.     Septic shock:  - 2/2 diabetic right foot ulcer  - per primary     Hypertension:  - home regimen: enalapril 10 mg BID  - will hold.     Diabetic foot ulcer:  - per ID, podiatry.        PE:   Vitals:    12/30/23 1213   BP: 132/83   Pulse: 88   Resp: 18   Temp: 99.5 °F (37.5 °C)   SpO2: 97%       General appearance: in NAD  Respiratory: Respiratory effort appears normal, bilateral equal chest rise, no wheeze, no crackles  Cardiovascular: Ausculation shows RRR no edema  Abdomen: No visible mass or tenderness, non distended.

## 2023-12-30 NOTE — ANESTHESIA POSTPROCEDURE EVALUATION
Department of Anesthesiology  Postprocedure Note    Patient: Gladys Mansfield  MRN: [id number]  YOB: 1955  Date of evaluation: 12/29/2023    Procedure Summary       Date: 12/29/23 Room / Location: [address] [facility] / [address] [place] [address]    Anesthesia Start: 3897 Anesthesia Stop: 8698    Procedure: FOOT DEBRIDEMENT TO BONE WITH BONE BIOPSY  INCISION AND DRAINAGE RIGHT FOOT (Right: Foot) Diagnosis:       Osteomyelitis of right foot, unspecified type ([address])      (Osteomyelitis of right foot, unspecified type ([address]) [M86.9])    Surgeons: Ramón Lucio DPM Responsible Provider: Adal Armijo MD    Anesthesia Type: MAC ASA Status: 3            Anesthesia Type: No value filed. Burke Phase I: Burke Score: 9    Burke Phase II:      Anesthesia Post Evaluation    Patient location during evaluation: PACU  Level of consciousness: awake  Airway patency: patent  Nausea & Vomiting: no vomiting  Cardiovascular status: blood pressure returned to baseline  Respiratory status: acceptable  Hydration status: stable  Pain management: adequate    No notable events documented.

## 2023-12-30 NOTE — PROGRESS NOTES
Patient slightly confused this morning. Wife states that this is new compared to yesterday. Shared concerns with physician over the phone during rounding. Electronically signed by Jaime Stevens RN on 12/30/2023 at 11:56 AM

## 2023-12-30 NOTE — PROGRESS NOTES
normal, no murmur, no rub or gallop  Abdomen - Soft, non-tender, bowel sounds active all four quadrants,  no masses, no organomegaly  Extremities - R foot with dressing in place, otherwise extremities normal, atraumatic, no cyanosis or edema  Pulses - 2+ and symmetric upper and lower extremities  Skin - Skin color, texture, turgor normal, no rashes or lesions    LABS:  Lab Results   Component Value Date/Time    WBC 15.5 12/29/2023 05:10 AM    RBC 2.88 12/29/2023 05:10 AM    HGB 8.3 12/29/2023 05:10 AM    HCT 25.0 12/29/2023 05:10 AM    MCV 86.7 12/29/2023 05:10 AM    RDW 13.3 12/29/2023 05:10 AM     12/29/2023 05:10 AM     Lab Results   Component Value Date/Time     12/29/2023 05:10 AM    K 3.9 12/29/2023 05:10 AM    K 4.2 12/27/2023 06:03 AM     12/29/2023 05:10 AM    CO2 15 12/29/2023 05:10 AM    BUN 49 12/29/2023 05:10 AM    CREATININE 3.4 12/29/2023 05:10 AM    AGRATIO 0.8 12/26/2023 11:00 PM    LABGLOM 19 12/29/2023 05:10 AM    LABGLOM 83 04/04/2023 12:00 AM    GLUCOSE 164 12/29/2023 05:10 AM    PROT 7.6 12/26/2023 11:00 PM    CALCIUM 8.3 12/29/2023 05:10 AM    BILITOT 0.8 12/26/2023 11:00 PM    ALKPHOS 124 12/26/2023 11:00 PM    AST 22 12/26/2023 11:00 PM    ALT 18 12/26/2023 11:00 PM     Lab Results   Component Value Date    CKTOTAL 41 12/28/2023     Lab Results   Component Value Date    CHOL 103 04/03/2023     Lab Results   Component Value Date    TRIG 107 04/03/2023     Lab Results   Component Value Date    LDLCALC 49 04/03/2023         CARDIAC STUDIES    ECG -A-fib with RVR    Echo - 12/28/2023   Ejection fraction is difficult to assess due to irregular rhythm but appears   to be visually estimated to be 35-40 %.   There is moderate diffuse hypokinesis. Definity echo contrast is given, no   intracardiac thrombus.   No evidence of any pericardial effusion.   An agitated saline study was performed and there appears to be right to left   interatrial shunt consistent with patent foramen  charlotte.    Stress test - n/a    Catheterization - n/a      ASSESSMENT & PLAN    Elevated troponin - no symptoms of angina.  Troponin trending down now.  Likely type II event in the setting of infection and sepsis  Cardiomyopathy with reduced LV function - EF confirmed to be 35 to 40% on repeat echo.  No clinical signs or symptoms of heart failure  A-fib with RVR - new diagnosis.  Brief episode yesterday with spontaneous conversion back to sinus rhythm.  No further sustained episodes.  Frequent atrial ectopy noted on telemetry.  Anticoagulation on hold given pending anemia workup but so far no clear source of blood loss found and appears to be anemia of chronic disease versus iron deficiency anemia versus both  WOLF on CKD    Will defer IV heparin to primary service and hematology pending anemia workup and surgical interventions by podiatry  Continue aspirin, rosuvastatin  Once recovered from infection and sepsis, will need ischemia evaluation with left heart cath before discharge  If recurrent persistent A-fib with RVR, please contact cardiology on-call.  Avoid IV diltiazem due to risk of decompensation and low ejection fraction.  Metoprolol, esmolol or amiodarone are reasonable alternative rate control agents      All questions and concerns were addressed to the patient/family. Alternatives to my treatment as well as risks and benefits of proposed treatment were discussed and understood by patient/family.     Thank you for the consult, please call with questions.    Jalen Kaufman MD, FAC, [facility]  Interventional Cardiology  [facility]  [phone] (c)  12/29/2023       Inadvertent computerized transcription errors may be present

## 2023-12-30 NOTE — PROGRESS NOTES
Pt has pulled heart monitor off multiple times during the shift stating he is tired of all of this. He states he wants to go home. Pt can answer all orientation questions. Calling pt's wife now. No answer, left VM.

## 2023-12-30 NOTE — PROGRESS NOTES
Podiatry Progress Note      Subjective:              The patient is a 68 y.o. male who is s/p 1 day right foot excisional debridement to bone with bone biopsy of midfoot and hallux. Reports no pain. Resting comfortably.    Past Medical History:        Diagnosis Date    Diabetes mellitus (HCC)     Hyperlipidemia     Hypertension        Past Surgical History:        Procedure Laterality Date    APPENDECTOMY      COLONOSCOPY      EYE SURGERY Left     lasik    FOOT SURGERY Right 2017    big toe    TOE AMPUTATION Right 3/3/2020    AMPUTATION OF RIGHT GREAT TOE performed by Candi Nixon DPM at [facility] OR       Current Medications:    Current Facility-Administered Medications: oxyCODONE-acetaminophen (PERCOCET) 5-325 MG per tablet 1 tablet, 1 tablet, Oral, Q4H PRN  folic acid (FOLVITE) tablet 1 mg, 1 mg, Oral, Daily  ferrous sulfate (IRON 325) tablet 325 mg, 325 mg, Oral, Daily with breakfast  sodium bicarbonate 150 mEq in sterile water 1,000 mL infusion, , IntraVENous, Continuous  DAPTOmycin (CUBICIN) 450 mg in sodium chloride 0.9 % 50 mL IVPB, 6 mg/kg (Adjusted), IntraVENous, Q48H  cefepime (MAXIPIME) 1,000 mg in sodium chloride 0.9 % 50 mL IVPB (mini-bag), 1,000 mg, IntraVENous, Q12H  aspirin EC tablet 81 mg, 81 mg, Oral, Daily  gabapentin (NEURONTIN) capsule 300 mg, 300 mg, Oral, TID  rosuvastatin (CRESTOR) tablet 10 mg, 10 mg, Oral, Daily  sodium chloride flush 0.9 % injection 5-40 mL, 5-40 mL, IntraVENous, 2 times per day  sodium chloride flush 0.9 % injection 5-40 mL, 5-40 mL, IntraVENous, PRN  0.9 % sodium chloride infusion, , IntraVENous, PRN  enoxaparin (LOVENOX) injection 40 mg, 40 mg, SubCUTAneous, Daily  acetaminophen (TYLENOL) tablet 650 mg, 650 mg, Oral, Q6H PRN **OR** acetaminophen (TYLENOL) suppository 650 mg, 650 mg, Rectal, Q6H PRN  glucose chewable tablet 16 g, 4 tablet, Oral, PRN  dextrose bolus 10% 125 mL, 125 mL, IntraVENous, PRN **OR** dextrose bolus 10% 250 mL, 250 mL, IntraVENous,    adjacent bony erosion to suggest osteomyelitis.      Pes planus with postsurgical changes and extensive degenerative changes as   above.         XR CHEST PORTABLE   Final Result   Hypoinflation with mild bibasilar discoid atelectasis or scarring             Impression/Recommendations:      The patient is a pleasant 68 y.o. male with:      - Cellulitis right foot, complicated by DM with neuropathy   - S/P 1 day right foot debridement and biopsy of bone.   - Await cultures and pathology results.   - Continue IV ABX per ID.   - Pending results, will determine length of time for ABX therapy.   - Will continue to follow.   - WB right foot, heel only for transfers.    - T2DM   - Management per primary.      - Other medical co-morbidities   - Management per primary.      Sc[place] DPM    Perfect Serve Message  Office: [phone]

## 2023-12-30 NOTE — PROGRESS NOTES
Occupational Therapy  Facility/Department: [facility] A2 CARD TELEMETRY  Occupational Therapy Initial Assessment    Name: Ant Carrasquillo  : 1955  MRN: [id number]  Date of Service: 2023    Discharge Recommendations:  Subacute/Skilled Nursing Facility  Therapy discharge recommendations take into account each patient's current medical complexities and are subject to input/oversight from the patient's healthcare team.   Barriers to Home Discharge:      [x] Unable to transfer, ambulate, or propel wheelchair household distances without assist   [x] Limited available assist at home upon discharge      [x]Unable to maintain ordered weight bearing status       If pt is unable to be seen after this session, please let this note serve as discharge summary.  Please see case management note for discharge disposition.  Thank you.           Patient Diagnosis(es): The primary encounter diagnosis was Septic shock (HCC). A diagnosis of Osteomyelitis of right foot, unspecified type (HCC) was also pertinent to this visit.  Past Medical History:  has a past medical history of Diabetes mellitus (HCC), Hyperlipidemia, and Hypertension.  Past Surgical History:  has a past surgical history that includes Foot surgery (Right, ); Colonoscopy; eye surgery (Left); Appendectomy; and Toe amputation (Right, 3/3/2020).           Assessment   Performance deficits / Impairments: Decreased functional mobility ;Decreased strength;Decreased ADL status;Decreased safe awareness;Decreased cognition;Decreased balance;Decreased coordination  Assessment: pt from home with wife, reports normally independent with BADL's & functional mobility without AD; pt admitted for sepsis, EToH withdrawal with right foot ulcer; s/p RIght foot I & D, now NWB, confused, difficulty following directions, dependent with LE self care, mod assist of 2 with stand-step transfers NWB RIght LE; continues to benefit from skilled OT services  REQUIRES OT FOLLOW-UP:  (Oreinted to place with cues)  Perception  Overall Perceptual Status: Impaired  Perseveration: Perseverates during conversation               Education Given To: Patient  Education Provided: Role of Therapy;Plan of Care;Precautions  Education Provided Comments: disease specific: importance of use of RED/nurse call light for assist with ADL's, positioning, transfers with staff assist d/t NWB RIght LE  Education Method: Verbal;Demonstration  Barriers to Learning: Cognition  Education Outcome: Continued education needed;Unable to verbalize                        G-Code     OutComes Score                                                  AM-PAC - ADL  AM-PAC Daily Activity - Inpatient   How much help is needed for putting on and taking off regular lower body clothing?: Total  How much help is needed for bathing (which includes washing, rinsing, drying)?: Total  How much help is needed for toileting (which includes using toilet, bedpan, or urinal)?: Total  How much help is needed for putting on and taking off regular upper body clothing?: A Lot  How much help is needed for taking care of personal grooming?: A Little  How much help for eating meals?: None  AM-[facility] Inpatient Daily Activity Raw Score: 12  AM-PAC Inpatient ADL T-Scale Score : 30.6  ADL Inpatient CMS 0-100% Score: 66.57  ADL Inpatient CMS G-Code Modifier : CL    [place] Score       Goals  Short Term Goals  Time Frame for Short Term Goals: 1 week(01-06-24)  Short Term Goal 1: mod assist of 1 stand-pivot transfers with walker by 01-06-24  Short Term Goal 2: tolerate 2 sets of 15 reps BUE strengthening exercises to increase strength for transfers/NWB Right LE  Short Term Goal 3: set up for UE self care by 01-04-24  Patient Goals   Patient goals : \"be able to get up to chair\"       Therapy Time   Individual Concurrent Group Co-treatment   Time In 0758         Time Out 0833         Minutes 35                 Tiffany Spain, OT

## 2023-12-30 NOTE — PLAN OF CARE
Patient has history of DM, blood sugars were uncontrolled this morning, appropriate insulin dosage was given to patient according to doctor's orders. Patient has no complaints of pain so far this shift. Patient is on wait list for telesitter as patient had difficulty overnight (pulling at lines and tele monitor.) Plans to move patient closer to the nurses station once a room becomes available. Patient has the bed alarm in place, wheels locked, bed in lowest positions and was instructed to call out if assistance was needed. Electronically signed by Jaime Stevens RN on 12/30/2023 at 12:00 PM

## 2023-12-30 NOTE — PROGRESS NOTES
Spoke to pt's wife and gave her an update. Pt said okay to call her. Wife states she is taking care of the animals and will come up shortly.

## 2023-12-30 NOTE — PROGRESS NOTES
Physical Therapy  Facility/Department: [facility] A2 CARD TELEMETRY  Physical Therapy Initial Assessment/Treatment     Name: Ant Carrasquillo  : 1955  MRN: [id number]  Date of Service: 2023    Discharge Recommendations:  Subacute/Skilled Nursing Facility   PT Equipment Recommendations  Equipment Needed: No  Other: Defer to next level of care    Therapy discharge recommendations take into account each patient's current medical complexities and are subject to input/oversight from the patient's healthcare team.     Barriers to Home Discharge:   [] Steps to access home entry or bed/bath:   [x] Unable to transfer, ambulate, or propel wheelchair household distances without assist   [x] Limited available assist at home upon discharge    [] Patient or family requests d/c to post-acute facility    [x] Poor cognition/safety awareness for d/c to home alone    [x] Unable to maintain ordered weight bearing status    [] Patient with salient signs of long-standing immobility   [] Decreased independence with ADLs   [x] Increased risk for falls   [] Other:    If pt is unable to be seen after this session, please let this note serve as discharge summary.  Please see case management note for discharge disposition.  Thank you.      Patient Diagnosis(es): The primary encounter diagnosis was Septic shock (HCC). A diagnosis of Osteomyelitis of right foot, unspecified type (HCC) was also pertinent to this visit.  Past Medical History:  has a past medical history of Diabetes mellitus (HCC), Hyperlipidemia, and Hypertension.  Past Surgical History:  has a past surgical history that includes Foot surgery (Right, ); Colonoscopy; eye surgery (Left); Appendectomy; and Toe amputation (Right, 3/3/2020).    Assessment   Body Structures, Functions, Activity Limitations Requiring Skilled Therapeutic Intervention: Decreased functional mobility ;Decreased cognition;Decreased endurance;Decreased posture;Decreased balance;Decreased safe

## 2023-12-31 LAB
ANION GAP SERPL CALCULATED.3IONS-SCNC: 10 MMOL/L (ref 3–16)
BACTERIA SPEC AEROBE CULT: ABNORMAL
BACTERIA SPEC AEROBE CULT: ABNORMAL
BUN SERPL-MCNC: 41 MG/DL (ref 7–20)
CALCIUM SERPL-MCNC: 8.4 MG/DL (ref 8.3–10.6)
CHLORIDE SERPL-SCNC: 102 MMOL/L (ref 99–110)
CO2 SERPL-SCNC: 23 MMOL/L (ref 21–32)
CREAT SERPL-MCNC: 2.7 MG/DL (ref 0.8–1.3)
DEPRECATED RDW RBC AUTO: 13.3 % (ref 12.4–15.4)
GFR SERPLBLD CREATININE-BSD FMLA CKD-EPI: 25 ML/MIN/{1.73_M2}
GLUCOSE BLD-MCNC: 126 MG/DL (ref 70–99)
GLUCOSE BLD-MCNC: 132 MG/DL (ref 70–99)
GLUCOSE BLD-MCNC: 169 MG/DL (ref 70–99)
GLUCOSE BLD-MCNC: 221 MG/DL (ref 70–99)
GLUCOSE SERPL-MCNC: 152 MG/DL (ref 70–99)
GRAM STN SPEC: ABNORMAL
HCT VFR BLD AUTO: 25.4 % (ref 40.5–52.5)
HGB BLD-MCNC: 8.6 G/DL (ref 13.5–17.5)
MCH RBC QN AUTO: 29.4 PG (ref 26–34)
MCHC RBC AUTO-ENTMCNC: 33.8 G/DL (ref 31–36)
MCV RBC AUTO: 86.8 FL (ref 80–100)
ORGANISM: ABNORMAL
ORGANISM: ABNORMAL
PERFORMED ON: ABNORMAL
PLATELET # BLD AUTO: 243 K/UL (ref 135–450)
PMV BLD AUTO: 7.5 FL (ref 5–10.5)
POTASSIUM SERPL-SCNC: 3.6 MMOL/L (ref 3.5–5.1)
RBC # BLD AUTO: 2.93 M/UL (ref 4.2–5.9)
SODIUM SERPL-SCNC: 135 MMOL/L (ref 136–145)
WBC # BLD AUTO: 9.6 K/UL (ref 4–11)

## 2023-12-31 PROCEDURE — 36415 COLL VENOUS BLD VENIPUNCTURE: CPT

## 2023-12-31 PROCEDURE — 2580000003 HC RX 258: Performed by: INTERNAL MEDICINE

## 2023-12-31 PROCEDURE — 2060000000 HC ICU INTERMEDIATE R&B

## 2023-12-31 PROCEDURE — 6370000000 HC RX 637 (ALT 250 FOR IP): Performed by: INTERNAL MEDICINE

## 2023-12-31 PROCEDURE — 85027 COMPLETE CBC AUTOMATED: CPT

## 2023-12-31 PROCEDURE — 6360000002 HC RX W HCPCS: Performed by: INTERNAL MEDICINE

## 2023-12-31 PROCEDURE — 80048 BASIC METABOLIC PNL TOTAL CA: CPT

## 2023-12-31 PROCEDURE — 6370000000 HC RX 637 (ALT 250 FOR IP)

## 2023-12-31 RX ORDER — FLUTICASONE PROPIONATE 50 MCG
1 SPRAY, SUSPENSION (ML) NASAL DAILY
Status: DISCONTINUED | OUTPATIENT
Start: 2023-12-31 | End: 2024-01-06 | Stop reason: HOSPADM

## 2023-12-31 RX ADMIN — GABAPENTIN 300 MG: 300 CAPSULE ORAL at 16:46

## 2023-12-31 RX ADMIN — ASPIRIN 81 MG: 81 TABLET, COATED ORAL at 10:20

## 2023-12-31 RX ADMIN — FERROUS SULFATE TAB 325 MG (65 MG ELEMENTAL FE) 325 MG: 325 (65 FE) TAB at 10:20

## 2023-12-31 RX ADMIN — FOLIC ACID 1 MG: 1 TABLET ORAL at 10:20

## 2023-12-31 RX ADMIN — INSULIN LISPRO 4 UNITS: 100 INJECTION, SOLUTION INTRAVENOUS; SUBCUTANEOUS at 10:24

## 2023-12-31 RX ADMIN — ROSUVASTATIN 10 MG: 10 TABLET, FILM COATED ORAL at 10:20

## 2023-12-31 RX ADMIN — INSULIN LISPRO 2 UNITS: 100 INJECTION, SOLUTION INTRAVENOUS; SUBCUTANEOUS at 12:58

## 2023-12-31 RX ADMIN — INSULIN GLARGINE 13 UNITS: 100 INJECTION, SOLUTION SUBCUTANEOUS at 20:38

## 2023-12-31 RX ADMIN — Medication 10 ML: at 20:39

## 2023-12-31 RX ADMIN — INSULIN LISPRO 4 UNITS: 100 INJECTION, SOLUTION INTRAVENOUS; SUBCUTANEOUS at 12:58

## 2023-12-31 RX ADMIN — CEFEPIME 1000 MG: 1 INJECTION, POWDER, FOR SOLUTION INTRAMUSCULAR; INTRAVENOUS at 10:30

## 2023-12-31 RX ADMIN — INSULIN LISPRO 4 UNITS: 100 INJECTION, SOLUTION INTRAVENOUS; SUBCUTANEOUS at 16:46

## 2023-12-31 RX ADMIN — CEFEPIME 1000 MG: 1 INJECTION, POWDER, FOR SOLUTION INTRAMUSCULAR; INTRAVENOUS at 20:48

## 2023-12-31 RX ADMIN — GABAPENTIN 300 MG: 300 CAPSULE ORAL at 10:20

## 2023-12-31 RX ADMIN — Medication 10 ML: at 10:20

## 2023-12-31 RX ADMIN — FLUTICASONE PROPIONATE 1 SPRAY: 50 SPRAY, METERED NASAL at 16:47

## 2023-12-31 RX ADMIN — GABAPENTIN 300 MG: 300 CAPSULE ORAL at 20:38

## 2023-12-31 ASSESSMENT — PAIN SCALES - GENERAL
PAINLEVEL_OUTOF10: 0
PAINLEVEL_OUTOF10: 0

## 2023-12-31 NOTE — PROGRESS NOTES
Progress note    CC: chills and rigors  Summary:   Ant Carrasquillo is being seen by nephrology for Acute kidney injury (WOLF). He is a 68 y.o. male with a PMH significant for T2DM, hypertesnion, diabetic foot ulcer s/p right foot toe amputation who presented to the ED 12/26/2023 with shaking and nausea. He is noted to have ulcers on his right foot with concern for osteomyelitis.    Interval History  Seen at bedside  /83  On room air  Made 1350 mL urine yesterday without diuretics. UO now normalizing  Cr now beginning to trend down, 2.7 today  Bicarb improved to 23 today  Na 135 today.  Went to OR for foot debridement 12/29/2023 with podiatry    Plan:   - evolving ATN. Cr trending down today  - continue to monitor UO  - continue supportive care      Brittany Cevallos MD  [facility] Nephrology  Office: [phone]    Assessment:   WOLF:  - baseline Cr 0.9-1.2  - Cr on admission was 2.5  - associated with septic shock, hypotension  - UA with protein but otherwise bland  - WOLF 2/2 hemodynamic insult     Acute metabolic acidosis:  - 2/2 WOLF  - will try to manage medically.     Septic shock:  - 2/2 diabetic right foot ulcer  - per primary     Hypertension:  - home regimen: enalapril 10 mg BID  - will hold.     Diabetic foot ulcer:  - per ID, podiatry.        PE:   Vitals:    12/31/23 1645   BP: 124/83   Pulse: 74   Resp: 20   Temp: 98.6 °F (37 °C)   SpO2: 99%       General appearance: in NAD  Respiratory: Respiratory effort appears normal, bilateral equal chest rise, no wheeze, no crackles  Cardiovascular: Ausculation shows RRR no edema  Abdomen: No visible mass or tenderness, non distended.

## 2023-12-31 NOTE — PROGRESS NOTES
results for input(s): \"LABA1C\" in the last 72 hours.    No results for input(s): \"AST\", \"ALT\", \"BILIDIR\", \"BILITOT\", \"ALKPHOS\" in the last 72 hours.  No results for input(s): \"INR\", \"LACTA\", \"TSH\" in the last 72 hours.    Urine Cultures: No results found for: \"LABURIN\"  Blood Cultures:   Lab Results   Component Value Date/Time    BC  12/26/2023 11:00 PM     Gram stain Aerobic bottle:  Gram variable rods  Information to follow  Gram stain Anaerobic bottle:  Gram positive cocci in clusters  resembling Staphylococcus  Information to follow  Organism identification not detected by PCR (Film Array)  See additional report for complete BCID panel  Organism identification not detected by PCR (Film Array)  See additional report for complete BCID panel      BC  12/26/2023 11:00 PM     POSITIVE for  Sensitivity to follow  Isolated one of two sets       Lab Results   Component Value Date/Time    BLOODCULT2  12/26/2023 11:15 PM     Gram stain Anaerobic bottle:  Gram variable rods  Information to follow       Organism:   Lab Results   Component Value Date/Time    ORG Strep agalactiae (Beta Strep Group B) 12/29/2023 08:30 AM    ORG Coagulase Negative Staphylococci 12/29/2023 08:30 AM         Jose J Nava MD

## 2023-12-31 NOTE — PROGRESS NOTES
[facility] on [facility]. PT pulled out multiple lines over night, attempts to pull krishna, and get out of bed when NWB on right foot. PT has new onset of confusion. RN contacted clinical to request sitter, clinical stated to restrain pt. RN feels pt is not appropriate for restraints. RN will continue to monitor pt

## 2023-12-31 NOTE — PLAN OF CARE
Problem: Discharge Planning  Goal: Discharge to home or other facility with appropriate resources  Outcome: Progressing     Problem: Safety - Adult  Goal: Free from fall injury  12/31/2023 0118 by Rafat Finley RN  Outcome: Progressing   Pt high fall risk. Instructed to use call light before getting out of bed. Call light within reach. Pt is impulsive and has some confusion.  Has non weight bearing R foot. Bed in low position. Bed alarm on.  Will continue to monitor.      Problem: Chronic Conditions and Co-morbidities  Goal: Patient's chronic conditions and co-morbidity symptoms are monitored and maintained or improved  12/31/2023 0118 by Rafat Finley RN  Outcome: Progressing     Problem: Pain  Goal: Verbalizes/displays adequate comfort level or baseline comfort level  12/31/2023 0118 by Rafat Finley RN  Outcome: Progressing     Problem: Confusion  Goal: Confusion, delirium, dementia, or psychosis is improved or at baseline  Description: INTERVENTIONS:  1. Assess for possible contributors to thought disturbance, including medications, impaired vision or hearing, underlying metabolic abnormalities, dehydration, psychiatric diagnoses, and notify attending LIP  2. [place] high risk fall precautions, as indicated  3. Provide frequent short contacts to provide reality reorientation, refocusing and direction  4. Decrease environmental stimuli, including noise as appropriate  5. Monitor and intervene to maintain adequate nutrition, hydration, elimination, sleep and activity  6. If unable to ensure safety without constant attention obtain sitter and review sitter guidelines with assigned personnel  7. Initiate Psychosocial CNS and Spiritual Care consult, as indicated  Outcome: Progressing

## 2023-12-31 NOTE — PROGRESS NOTES
Pt coughed dark bloody clot and sputum for a couple hours. Paged MD. MD by the bedside agrees with Pt and RN that bleeding source was from the nose. Advised to hold Lovenox.

## 2023-12-31 NOTE — PROGRESS NOTES
Pt was non-cooperative earlier this shift. Refused med, pulled out R wrist IV and attempted to pull krishna. RN spoke with wife who denies Pts active drinking of EtOH. Pt spoke with wife on RN's phone and said am  \"I am going crazy here. I need to go home\" Pt was redirected and later calmed down was able to take p.o meds and IV this shift and slept for couple hours.Pt denies pain at this time. Will continue to monitor

## 2023-12-31 NOTE — PLAN OF CARE
Problem: Discharge Planning  Goal: Discharge to home or other facility with appropriate resources  12/31/2023 1754 by Keshia Busch RN  Outcome: Progressing  12/31/2023 0559 by Rafat Finley RN  Outcome: Progressing     Problem: Safety - Adult  Goal: Free from fall injury  12/31/2023 1754 by Keshia Busch RN  Outcome: Progressing  12/31/2023 0559 by Rafat Finley RN  Outcome: Progressing     Problem: Chronic Conditions and Co-morbidities  Goal: Patient's chronic conditions and co-morbidity symptoms are monitored and maintained or improved  12/31/2023 1754 by Keshia Busch RN  Outcome: Progressing  12/31/2023 0559 by Rafat Finley RN  Outcome: Progressing     Problem: Pain  Goal: Verbalizes/displays adequate comfort level or baseline comfort level  Outcome: Progressing     Problem: Confusion  Goal: Confusion, delirium, dementia, or psychosis is improved or at baseline  Description: INTERVENTIONS:  1. Assess for possible contributors to thought disturbance, including medications, impaired vision or hearing, underlying metabolic abnormalities, dehydration, psychiatric diagnoses, and notify attending LIP  2. [place] high risk fall precautions, as indicated  3. Provide frequent short contacts to provide reality reorientation, refocusing and direction  4. Decrease environmental stimuli, including noise as appropriate  5. Monitor and intervene to maintain adequate nutrition, hydration, elimination, sleep and activity  6. If unable to ensure safety without constant attention obtain sitter and review sitter guidelines with assigned personnel  7. Initiate Psychosocial CNS and Spiritual Care consult, as indicated  12/31/2023 1754 by Keshia Busch RN  Outcome: Progressing  12/31/2023 0559 by Rafat Finley RN  Outcome: Progressing     Problem: Skin/Tissue Integrity  Goal: Absence of new skin breakdown  Description: 1.  Monitor for areas of redness and/or skin breakdown  2.  Assess

## 2023-12-31 NOTE — PLAN OF CARE
Problem: Discharge Planning  Goal: Discharge to home or other facility with appropriate resources  12/31/2023 0118 by Rafat Fniley RN  Outcome: Progressing     Problem: Safety - Adult  Goal: Free from fall injury  12/31/2023 0118 by Rafat Finley RN  Outcome: Progressing     Problem: Chronic Conditions and Co-morbidities  Goal: Patient's chronic conditions and co-morbidity symptoms are monitored and maintained or improved  12/31/2023 0118 by Rafat Finley RN  Outcome: Progressing     Problem: Confusion  Goal: Confusion, delirium, dementia, or psychosis is improved or at baseline  Description: INTERVENTIONS:  1. Assess for possible contributors to thought disturbance, including medications, impaired vision or hearing, underlying metabolic abnormalities, dehydration, psychiatric diagnoses, and notify attending LIP  2. [place] high risk fall precautions, as indicated  3. Provide frequent short contacts to provide reality reorientation, refocusing and direction  4. Decrease environmental stimuli, including noise as appropriate  5. Monitor and intervene to maintain adequate nutrition, hydration, elimination, sleep and activity  6. If unable to ensure safety without constant attention obtain sitter and review sitter guidelines with assigned personnel  7. Initiate Psychosocial CNS and Spiritual Care consult, as indicated  12/31/2023 0141 by Rafat Finley RN  Outcome: Progressing

## 2024-01-01 LAB
ANION GAP SERPL CALCULATED.3IONS-SCNC: 12 MMOL/L (ref 3–16)
BACTERIA BLD CULT: ABNORMAL
BUN SERPL-MCNC: 35 MG/DL (ref 7–20)
CALCIUM SERPL-MCNC: 8.2 MG/DL (ref 8.3–10.6)
CHLORIDE SERPL-SCNC: 100 MMOL/L (ref 99–110)
CO2 SERPL-SCNC: 21 MMOL/L (ref 21–32)
CREAT SERPL-MCNC: 2.1 MG/DL (ref 0.8–1.3)
GFR SERPLBLD CREATININE-BSD FMLA CKD-EPI: 34 ML/MIN/{1.73_M2}
GLUCOSE BLD-MCNC: 157 MG/DL (ref 70–99)
GLUCOSE BLD-MCNC: 162 MG/DL (ref 70–99)
GLUCOSE BLD-MCNC: 230 MG/DL (ref 70–99)
GLUCOSE BLD-MCNC: 236 MG/DL (ref 70–99)
GLUCOSE SERPL-MCNC: 270 MG/DL (ref 70–99)
ORGANISM: ABNORMAL
ORGANISM: ABNORMAL
PERFORMED ON: ABNORMAL
POTASSIUM SERPL-SCNC: 3.7 MMOL/L (ref 3.5–5.1)
SODIUM SERPL-SCNC: 133 MMOL/L (ref 136–145)

## 2024-01-01 PROCEDURE — 80048 BASIC METABOLIC PNL TOTAL CA: CPT

## 2024-01-01 PROCEDURE — 2060000000 HC ICU INTERMEDIATE R&B

## 2024-01-01 PROCEDURE — 6370000000 HC RX 637 (ALT 250 FOR IP): Performed by: INTERNAL MEDICINE

## 2024-01-01 PROCEDURE — 2580000003 HC RX 258: Performed by: INTERNAL MEDICINE

## 2024-01-01 PROCEDURE — 6360000002 HC RX W HCPCS: Performed by: INTERNAL MEDICINE

## 2024-01-01 PROCEDURE — 36415 COLL VENOUS BLD VENIPUNCTURE: CPT

## 2024-01-01 PROCEDURE — 6370000000 HC RX 637 (ALT 250 FOR IP)

## 2024-01-01 PROCEDURE — 6370000000 HC RX 637 (ALT 250 FOR IP): Performed by: STUDENT IN AN ORGANIZED HEALTH CARE EDUCATION/TRAINING PROGRAM

## 2024-01-01 RX ORDER — POTASSIUM CHLORIDE 20 MEQ/1
20 TABLET, EXTENDED RELEASE ORAL ONCE
Status: COMPLETED | OUTPATIENT
Start: 2024-01-01 | End: 2024-01-01

## 2024-01-01 RX ADMIN — GABAPENTIN 300 MG: 300 CAPSULE ORAL at 09:26

## 2024-01-01 RX ADMIN — ASPIRIN 81 MG: 81 TABLET, COATED ORAL at 09:26

## 2024-01-01 RX ADMIN — CEFEPIME 1000 MG: 1 INJECTION, POWDER, FOR SOLUTION INTRAMUSCULAR; INTRAVENOUS at 23:01

## 2024-01-01 RX ADMIN — INSULIN LISPRO 4 UNITS: 100 INJECTION, SOLUTION INTRAVENOUS; SUBCUTANEOUS at 09:25

## 2024-01-01 RX ADMIN — FERROUS SULFATE TAB 325 MG (65 MG ELEMENTAL FE) 325 MG: 325 (65 FE) TAB at 09:25

## 2024-01-01 RX ADMIN — SODIUM CHLORIDE: 9 INJECTION, SOLUTION INTRAVENOUS at 23:01

## 2024-01-01 RX ADMIN — CEFEPIME 1000 MG: 1 INJECTION, POWDER, FOR SOLUTION INTRAMUSCULAR; INTRAVENOUS at 10:49

## 2024-01-01 RX ADMIN — INSULIN LISPRO 4 UNITS: 100 INJECTION, SOLUTION INTRAVENOUS; SUBCUTANEOUS at 12:34

## 2024-01-01 RX ADMIN — POTASSIUM CHLORIDE 20 MEQ: 1500 TABLET, EXTENDED RELEASE ORAL at 16:12

## 2024-01-01 RX ADMIN — FOLIC ACID 1 MG: 1 TABLET ORAL at 09:26

## 2024-01-01 RX ADMIN — GABAPENTIN 300 MG: 300 CAPSULE ORAL at 16:12

## 2024-01-01 RX ADMIN — ROSUVASTATIN 10 MG: 10 TABLET, FILM COATED ORAL at 09:25

## 2024-01-01 RX ADMIN — FLUTICASONE PROPIONATE 1 SPRAY: 50 SPRAY, METERED NASAL at 09:26

## 2024-01-01 RX ADMIN — SODIUM CHLORIDE: 9 INJECTION, SOLUTION INTRAVENOUS at 20:12

## 2024-01-01 RX ADMIN — ENOXAPARIN SODIUM 40 MG: 100 INJECTION SUBCUTANEOUS at 09:26

## 2024-01-01 RX ADMIN — INSULIN GLARGINE 13 UNITS: 100 INJECTION, SOLUTION SUBCUTANEOUS at 20:01

## 2024-01-01 RX ADMIN — INSULIN LISPRO 2 UNITS: 100 INJECTION, SOLUTION INTRAVENOUS; SUBCUTANEOUS at 12:34

## 2024-01-01 RX ADMIN — Medication 10 ML: at 09:26

## 2024-01-01 RX ADMIN — GABAPENTIN 300 MG: 300 CAPSULE ORAL at 20:02

## 2024-01-01 RX ADMIN — INSULIN LISPRO 4 UNITS: 100 INJECTION, SOLUTION INTRAVENOUS; SUBCUTANEOUS at 18:42

## 2024-01-01 RX ADMIN — DAPTOMYCIN 450 MG: 500 INJECTION, POWDER, LYOPHILIZED, FOR SOLUTION INTRAVENOUS at 20:13

## 2024-01-01 NOTE — PLAN OF CARE
Problem: Discharge Planning  Goal: Discharge to home or other facility with appropriate resources  12/31/2023 2228 by Crystal Hargrove RN  Outcome: Progressing     Problem: Safety - Adult  Goal: Free from fall injury  12/31/2023 2228 by Crystal Hargrove RN  Outcome: Progressing  Note: Pt will remain free from falls throughout hospital stay. Fall precautions in place, bed alarm on, bed in lowest position with wheels locked and side rails 2/4 up. Room door open and hourly rounding completed. Will continue to monitor throughout shift.     For patient safety, an AVASYS camera has been placed in patient's room.  AVASYS monitoring needed for   , Confusion, Increased Fall Risk, and Pulling at Lines.  Writer placed call to monitor observer to verify camera number  and review patient name, reason for monitoring, and contact information for primary RN.  Patient and family aware of use of remote monitoring upon implementation of camera and verbalized understanding.       Problem: Chronic Conditions and Co-morbidities  Goal: Patient's chronic conditions and co-morbidity symptoms are monitored and maintained or improved  12/31/2023 2228 by Crystal Hargrove RN  Outcome: Progressing     Problem: Pain  Goal: Verbalizes/displays adequate comfort level or baseline comfort level  12/31/2023 2228 by Crystal Hargrove RN  Outcome: Progressing  Note: Pt will be satisfied with pain control. Pt uses numeric pain rating scale with reassessments after pain med administration. Will continue to monitor progression throughout shift.      Problem: Confusion  Goal: Confusion, delirium, dementia, or psychosis is improved or at baseline  Description: INTERVENTIONS:  1. Assess for possible contributors to thought disturbance, including medications, impaired vision or hearing, underlying metabolic abnormalities, dehydration, psychiatric diagnoses, and notify attending LIP  2. [place] high risk fall precautions, as indicated  3. Provide frequent short

## 2024-01-01 NOTE — PLAN OF CARE
Problem: Discharge Planning  Goal: Discharge to home or other facility with appropriate resources  1/1/2024 1114 by Keshia Busch RN  Outcome: Progressing  12/31/2023 2228 by Crystal Hargrove RN  Outcome: Progressing     Problem: Safety - Adult  Goal: Free from fall injury  1/1/2024 1114 by Keshia Busch RN  Outcome: Progressing  12/31/2023 2228 by Crystal Hargrove RN  Outcome: Progressing  Note: Pt will remain free from falls throughout hospital stay. Fall precautions in place, bed alarm on, bed in lowest position with wheels locked and side rails 2/4 up. Room door open and hourly rounding completed. Will continue to monitor throughout shift.     For patient safety, an AVASYS camera has been placed in patient's room.  AVASYS monitoring needed for   , Confusion, Increased Fall Risk, and Pulling at Lines.  Writer placed call to monitor observer to verify camera number  and review patient name, reason for monitoring, and contact information for primary RN.  Patient and family aware of use of remote monitoring upon implementation of camera and verbalized understanding.       Problem: Chronic Conditions and Co-morbidities  Goal: Patient's chronic conditions and co-morbidity symptoms are monitored and maintained or improved  1/1/2024 1114 by Keshia Busch RN  Outcome: Progressing  12/31/2023 2228 by Crystal Hargrove RN  Outcome: Progressing     Problem: Pain  Goal: Verbalizes/displays adequate comfort level or baseline comfort level  1/1/2024 1114 by Keshia Busch RN  Outcome: Progressing  12/31/2023 2228 by Crystal Hargrove RN  Outcome: Progressing  Note: Pt will be satisfied with pain control. Pt uses numeric pain rating scale with reassessments after pain med administration. Will continue to monitor progression throughout shift.      Problem: Confusion  Goal: Confusion, delirium, dementia, or psychosis is improved or at baseline  Description: INTERVENTIONS:  1. Assess for possible contributors to thought  disturbance, including medications, impaired vision or hearing, underlying metabolic abnormalities, dehydration, psychiatric diagnoses, and notify attending LIP  2. [place] high risk fall precautions, as indicated  3. Provide frequent short contacts to provide reality reorientation, refocusing and direction  4. Decrease environmental stimuli, including noise as appropriate  5. Monitor and intervene to maintain adequate nutrition, hydration, elimination, sleep and activity  6. If unable to ensure safety without constant attention obtain sitter and review sitter guidelines with assigned personnel  7. Initiate Psychosocial CNS and Spiritual Care consult, as indicated  1/1/2024 1114 by Keshia Busch RN  Outcome: Progressing  12/31/2023 2228 by Crystal Hargrove, RN  Outcome: Progressing     Problem: Skin/Tissue Integrity  Goal: Absence of new skin breakdown  Description: 1.  Monitor for areas of redness and/or skin breakdown  2.  Assess vascular access sites hourly  3.  Every 4-6 hours minimum:  Change oxygen saturation probe site  4.  Every 4-6 hours:  If on nasal continuous positive airway pressure, respiratory therapy assess nares and determine need for appliance change or resting period.  1/1/2024 1114 by Keshia Busch, RN  Outcome: Progressing  12/31/2023 2228 by Crystal Hargrove, RN  Outcome: Progressing

## 2024-01-01 NOTE — PROGRESS NOTES
[facility] Medicine Progress Note      Date of Admission: 12/26/2023  Hospital Day: 7    Chief Admission Complaint:  rigors     Subjective: In the bed  No new complaints   No confusion   Presenting Admission History:       68 y.o. male who presented to [facility] with shaking.  PMHx significant for diabetes, hypertension, hyperlipidemia, diabetic foot ulcer, right foot toe amputation.  Patient states around 9 PM yesterday he started to develop significant shaking associated with nausea.  He states that shaking also bad that he was unable to do anything and decided to come to the ER.    Patient states that he has an ulcer on the bottom of his foot which has been draining recently.      He has been seen by podiatry, cardiology, ID and nephrology in consultation.  He did go on 12/29/23 for foot debridement to the bone with bone biopsy, incision and drainage of right foot.  He is receiving IV antibiotics    Assessment/Plan:      Current Principal Problem:  Septic shock (HCC)    Right foot osteomyelitis - clinically suspected w/ diabetic foot infection/ulcer..  Infectious Disease consulted and appreciated.   He is receiving IV antibiotics, daptomycin and cefepime.  Podiatry consulted and appreciated.  He did go to OR on  29 Dec.  For right foot foot debridement to the bone with bone biopsy and incision and drainage.  WB right foot, heel only for transfers.        Sepsis - w/ septic shock requiring central line, pressor support and ICU admission.  2nd to above.  Hemodynamically he is improving.  Resolved     Troponin elevation - c/w myocardial injury.  Likely 2nd to sepsis. Cardiology consulted and appreciated.                                            [] STEMI/NSTEMI  [x] Trop leak 2nd to ARF  [x] Chronic Myocardial Injury 2nd to CHF  [] Recent MI (with 4 weeks of admission) dated:      of unclear clinical significance w/out signs/sxs of active ischemia.  Documented and reviewed. Echo w/ reduced EF 35-40% dated this

## 2024-01-01 NOTE — PROGRESS NOTES
Progress note    CC: chills and rigors  Summary:   Ant Carrasquillo is being seen by nephrology for Acute kidney injury (WOLF). He is a 68 y.o. male with a PMH significant for T2DM, hypertesnion, diabetic foot ulcer s/p right foot toe amputation who presented to the ED 12/26/2023 with shaking and nausea. He is noted to have ulcers on his right foot with concern for osteomyelitis.    Interval History  Seen at bedside  /77  On room air  Made 1950 mL urine yesterday without diuretics.  Cr now beginning to trend down, creatinine 2.1 today from 2.7 yesterday  Bicarb stable at 21 today  Na 133  Went to OR for foot debridement 12/29/2023 with podiatry    Plan:   - evolving ATN. Cr trending down today  - BP at goal.  Continue current medications  - Give 20 mEq of oral potassium  - continue to monitor UO  - continue supportive care  - ok to proceed with [facility] for cardiac ischemia work up tomorrow from nephrology perspective. If planned would give pre- and post-procedure hydration      Brittany Cevallos MD  [facility] Nephrology  Office: [phone]    Assessment:   WOLF:  - baseline Cr 0.9-1.2  - Cr on admission was 2.5  - associated with septic shock, hypotension  - UA with protein but otherwise bland  - WOLF 2/2 hemodynamic insult     Acute metabolic acidosis:  - 2/2 WOLF  - will try to manage medically.     Septic shock:  - 2/2 diabetic right foot ulcer  - per primary     Hypertension:  - home regimen: enalapril 10 mg BID  - will hold.     Diabetic foot ulcer:  - per ID, podiatry.        PE:   Vitals:    01/01/24 1148   BP: 122/77   Pulse: 86   Resp: 16   Temp: 98.4 °F (36.9 °C)   SpO2: 98%       General appearance: in NAD  Respiratory: Respiratory effort appears normal, bilateral equal chest rise, no wheeze, no crackles  Cardiovascular: Ausculation shows RRR no edema  Abdomen: No visible mass or tenderness, non distended.

## 2024-01-02 ENCOUNTER — APPOINTMENT (OUTPATIENT)
Dept: NUCLEAR MEDICINE | Age: 69
End: 2024-01-02
Payer: MEDICARE

## 2024-01-02 ENCOUNTER — TELEPHONE (OUTPATIENT)
Dept: CARDIOLOGY | Age: 69
End: 2024-01-02

## 2024-01-02 ENCOUNTER — APPOINTMENT (OUTPATIENT)
Dept: VASCULAR LAB | Age: 69
End: 2024-01-02
Payer: MEDICARE

## 2024-01-02 PROBLEM — N18.9 ACUTE KIDNEY INJURY SUPERIMPOSED ON CKD (HCC): Status: ACTIVE | Noted: 2023-12-28

## 2024-01-02 PROBLEM — R79.89 ELEVATED TROPONIN: Status: ACTIVE | Noted: 2024-01-02

## 2024-01-02 PROBLEM — I42.9 CARDIOMYOPATHY (HCC): Status: ACTIVE | Noted: 2024-01-02

## 2024-01-02 PROBLEM — I48.0 PAF (PAROXYSMAL ATRIAL FIBRILLATION) (HCC): Status: ACTIVE | Noted: 2024-01-02

## 2024-01-02 PROBLEM — M86.9 OSTEOMYELITIS OF RIGHT FOOT (HCC): Status: ACTIVE | Noted: 2024-01-02

## 2024-01-02 LAB
ANION GAP SERPL CALCULATED.3IONS-SCNC: 10 MMOL/L (ref 3–16)
BACTERIA SPEC AEROBE CULT: NORMAL
BACTERIA SPEC AEROBE CULT: NORMAL
BACTERIA SPEC ANAEROBE CULT: NORMAL
BACTERIA SPEC ANAEROBE CULT: NORMAL
BASOPHILS # BLD: 0 K/UL (ref 0–0.2)
BASOPHILS NFR BLD: 0.3 %
BUN SERPL-MCNC: 31 MG/DL (ref 7–20)
CALCIUM SERPL-MCNC: 8.3 MG/DL (ref 8.3–10.6)
CHLORIDE SERPL-SCNC: 100 MMOL/L (ref 99–110)
CO2 SERPL-SCNC: 23 MMOL/L (ref 21–32)
CREAT SERPL-MCNC: 1.9 MG/DL (ref 0.8–1.3)
DEPRECATED RDW RBC AUTO: 13.2 % (ref 12.4–15.4)
EOSINOPHIL # BLD: 0.3 K/UL (ref 0–0.6)
EOSINOPHIL NFR BLD: 3 %
GFR SERPLBLD CREATININE-BSD FMLA CKD-EPI: 38 ML/MIN/{1.73_M2}
GLUCOSE BLD-MCNC: 176 MG/DL (ref 70–99)
GLUCOSE BLD-MCNC: 183 MG/DL (ref 70–99)
GLUCOSE BLD-MCNC: 186 MG/DL (ref 70–99)
GLUCOSE BLD-MCNC: 271 MG/DL (ref 70–99)
GLUCOSE SERPL-MCNC: 166 MG/DL (ref 70–99)
GRAM STN SPEC: NORMAL
GRAM STN SPEC: NORMAL
HCT VFR BLD AUTO: 25.2 % (ref 40.5–52.5)
HGB BLD-MCNC: 8.6 G/DL (ref 13.5–17.5)
LYMPHOCYTES # BLD: 1.7 K/UL (ref 1–5.1)
LYMPHOCYTES NFR BLD: 18.1 %
MCH RBC QN AUTO: 29.5 PG (ref 26–34)
MCHC RBC AUTO-ENTMCNC: 34 G/DL (ref 31–36)
MCV RBC AUTO: 86.7 FL (ref 80–100)
MONOCYTES # BLD: 1 K/UL (ref 0–1.3)
MONOCYTES NFR BLD: 10.7 %
NEUTROPHILS # BLD: 6.3 K/UL (ref 1.7–7.7)
NEUTROPHILS NFR BLD: 67.9 %
PERFORMED ON: ABNORMAL
PLATELET # BLD AUTO: 300 K/UL (ref 135–450)
PMV BLD AUTO: 7.6 FL (ref 5–10.5)
POTASSIUM SERPL-SCNC: 3.8 MMOL/L (ref 3.5–5.1)
RBC # BLD AUTO: 2.91 M/UL (ref 4.2–5.9)
SODIUM SERPL-SCNC: 133 MMOL/L (ref 136–145)
WBC # BLD AUTO: 9.2 K/UL (ref 4–11)

## 2024-01-02 PROCEDURE — 80048 BASIC METABOLIC PNL TOTAL CA: CPT

## 2024-01-02 PROCEDURE — 6370000000 HC RX 637 (ALT 250 FOR IP)

## 2024-01-02 PROCEDURE — 2060000000 HC ICU INTERMEDIATE R&B

## 2024-01-02 PROCEDURE — 6360000002 HC RX W HCPCS: Performed by: NURSE PRACTITIONER

## 2024-01-02 PROCEDURE — 2580000003 HC RX 258: Performed by: INTERNAL MEDICINE

## 2024-01-02 PROCEDURE — 78452 HT MUSCLE IMAGE SPECT MULT: CPT

## 2024-01-02 PROCEDURE — 6360000002 HC RX W HCPCS: Performed by: INTERNAL MEDICINE

## 2024-01-02 PROCEDURE — 93971 EXTREMITY STUDY: CPT

## 2024-01-02 PROCEDURE — 6370000000 HC RX 637 (ALT 250 FOR IP): Performed by: INTERNAL MEDICINE

## 2024-01-02 PROCEDURE — 85025 COMPLETE CBC W/AUTO DIFF WBC: CPT

## 2024-01-02 PROCEDURE — 99232 SBSQ HOSP IP/OBS MODERATE 35: CPT | Performed by: INTERNAL MEDICINE

## 2024-01-02 PROCEDURE — A9502 TC99M TETROFOSMIN: HCPCS | Performed by: NURSE PRACTITIONER

## 2024-01-02 PROCEDURE — 36415 COLL VENOUS BLD VENIPUNCTURE: CPT

## 2024-01-02 PROCEDURE — 93017 CV STRESS TEST TRACING ONLY: CPT

## 2024-01-02 PROCEDURE — 99233 SBSQ HOSP IP/OBS HIGH 50: CPT | Performed by: NURSE PRACTITIONER

## 2024-01-02 PROCEDURE — 3430000000 HC RX DIAGNOSTIC RADIOPHARMACEUTICAL: Performed by: NURSE PRACTITIONER

## 2024-01-02 RX ORDER — REGADENOSON 0.08 MG/ML
0.4 INJECTION, SOLUTION INTRAVENOUS
Status: COMPLETED | OUTPATIENT
Start: 2024-01-02 | End: 2024-01-02

## 2024-01-02 RX ORDER — REGADENOSON 0.08 MG/ML
0.4 INJECTION, SOLUTION INTRAVENOUS
OUTPATIENT
Start: 2024-01-02

## 2024-01-02 RX ORDER — METOPROLOL SUCCINATE 25 MG/1
25 TABLET, EXTENDED RELEASE ORAL DAILY
Status: DISCONTINUED | OUTPATIENT
Start: 2024-01-03 | End: 2024-01-06 | Stop reason: HOSPADM

## 2024-01-02 RX ADMIN — TETROFOSMIN 10.6 MILLICURIE: 1.38 INJECTION, POWDER, LYOPHILIZED, FOR SOLUTION INTRAVENOUS at 12:35

## 2024-01-02 RX ADMIN — Medication 10 ML: at 09:08

## 2024-01-02 RX ADMIN — TETROFOSMIN 34.2 MILLICURIE: 1.38 INJECTION, POWDER, LYOPHILIZED, FOR SOLUTION INTRAVENOUS at 14:05

## 2024-01-02 RX ADMIN — ROSUVASTATIN 10 MG: 10 TABLET, FILM COATED ORAL at 09:07

## 2024-01-02 RX ADMIN — SODIUM CHLORIDE: 9 INJECTION, SOLUTION INTRAVENOUS at 16:29

## 2024-01-02 RX ADMIN — FERROUS SULFATE TAB 325 MG (65 MG ELEMENTAL FE) 325 MG: 325 (65 FE) TAB at 09:07

## 2024-01-02 RX ADMIN — GABAPENTIN 300 MG: 300 CAPSULE ORAL at 09:07

## 2024-01-02 RX ADMIN — INSULIN GLARGINE 13 UNITS: 100 INJECTION, SOLUTION SUBCUTANEOUS at 19:58

## 2024-01-02 RX ADMIN — SODIUM CHLORIDE: 9 INJECTION, SOLUTION INTRAVENOUS at 10:02

## 2024-01-02 RX ADMIN — GABAPENTIN 300 MG: 300 CAPSULE ORAL at 19:58

## 2024-01-02 RX ADMIN — GABAPENTIN 300 MG: 300 CAPSULE ORAL at 16:00

## 2024-01-02 RX ADMIN — PIPERACILLIN AND TAZOBACTAM 3375 MG: 3; .375 INJECTION, POWDER, FOR SOLUTION INTRAVENOUS at 16:29

## 2024-01-02 RX ADMIN — REGADENOSON 0.4 MG: 0.08 INJECTION, SOLUTION INTRAVENOUS at 14:05

## 2024-01-02 RX ADMIN — Medication 10 ML: at 19:58

## 2024-01-02 RX ADMIN — PIPERACILLIN AND TAZOBACTAM 3375 MG: 3; .375 INJECTION, POWDER, FOR SOLUTION INTRAVENOUS at 23:49

## 2024-01-02 RX ADMIN — CEFEPIME 1000 MG: 1 INJECTION, POWDER, FOR SOLUTION INTRAMUSCULAR; INTRAVENOUS at 10:03

## 2024-01-02 RX ADMIN — ASPIRIN 81 MG: 81 TABLET, COATED ORAL at 09:07

## 2024-01-02 RX ADMIN — FOLIC ACID 1 MG: 1 TABLET ORAL at 09:07

## 2024-01-02 RX ADMIN — FLUTICASONE PROPIONATE 1 SPRAY: 50 SPRAY, METERED NASAL at 09:07

## 2024-01-02 ASSESSMENT — PAIN SCALES - GENERAL: PAINLEVEL_OUTOF10: 0

## 2024-01-02 NOTE — PROGRESS NOTES
Progress note    CC: chills and rigors  Summary:   Ant Carrasquillo is being seen by nephrology for Acute kidney injury (WOLF). He is a 68 y.o. male with a PMH significant for T2DM, hypertesnion, diabetic foot ulcer s/p right foot toe amputation who presented to the ED 12/26/2023 with shaking and nausea. He is noted to have ulcers on his right foot with concern for osteomyelitis.    Interval History  Seen at bedside  /80  On room air  Made 2225 mL urine yesterday without diuretics.  Cr now beginning to trend down, creatinine 1.7 today from 2.1 yesterday  Bicarb stable at 23 today  Na 133  Went to OR for foot debridement 12/29/2023 with podiatry    Plan:   - evolving ATN. Cr continues to improve. Not back to baseline yet.  - BP at goal.  Continue current medications  - continue to monitor UO  - continue supportive care  - ok to proceed with [facility] for cardiac ischemia work up today from nephrology perspective. If planned would give pre- and post-procedure hydration      Brittany Cevallos MD  [facility] Nephrology  Office: [phone]    Assessment:   WOLF:  - baseline Cr 0.9-1.2  - Cr on admission was 2.5  - associated with septic shock, hypotension  - UA with protein but otherwise bland  - WOLF 2/2 hemodynamic insult     Acute metabolic acidosis:  - 2/2 WOLF  - will try to manage medically.     Septic shock:  - 2/2 diabetic right foot ulcer  - per primary     Hypertension:  - home regimen: enalapril 10 mg BID  - will hold.     Diabetic foot ulcer:  - per ID, podiatry.        PE:   Vitals:    01/02/24 0822   BP: 137/80   Pulse: 99   Resp: 16   Temp: 98.2 °F (36.8 °C)   SpO2: 96%       General appearance: in NAD  Respiratory: Respiratory effort appears normal, bilateral equal chest rise, no wheeze, no crackles  Cardiovascular: Ausculation shows RRR no edema  Abdomen: No visible mass or tenderness, non distended.

## 2024-01-02 NOTE — CARE COORDINATION
CM update: LOS # 6; IM, Nephrology, Cardiology following. Waiting on results of Lexiscan for further testing to be ordered. Will monitor. PT/OT recommending SNF. Will follow.Ritika Miller RN

## 2024-01-02 NOTE — PROGRESS NOTES
Infectious Disease Follow up Notes    CC :  DFI      Antibiotics:  Dapto 450 q48     Cefepime 12/28-1/2    Admit Date:   12/26/2023  Hospital Day: 8    Subjective:   He remains AF on RA   No specific complaints today.    Doppler today showed SVT RLE     Objective:     Patient Vitals for the past 8 hrs:   BP Temp Temp src Pulse Resp SpO2   01/02/24 1247 (!) 146/87 98.6 °F (37 °C) -- 88 -- --   01/02/24 0822 137/80 98.2 °F (36.8 °C) Oral 99 16 96 %       EXAM:  General:   alert, NAD    HEENT:   NCAT, PERRL, sclera anicteric  NECK:   supple  LUNGS:    non-labored breathing      ABD: Soft, flat, NT   EXT: Focal erythema lower R leg with tenderness  Bulky dressing R foot   SKIN: No focal rash         LINE:   PIV in place         Scheduled Meds:   [START ON 1/3/2024] metoprolol succinate  25 mg Oral Daily    fluticasone  1 spray Each Nostril Daily    folic acid  1 mg Oral Daily    ferrous sulfate  325 mg Oral Daily with breakfast    DAPTOmycin (CUBICIN) 450 mg in sodium chloride 0.9 % 50 mL IVPB  6 mg/kg (Adjusted) IntraVENous Q48H    aspirin EC  81 mg Oral Daily    gabapentin  300 mg Oral TID    rosuvastatin  10 mg Oral Daily    sodium chloride flush  5-40 mL IntraVENous 2 times per day    enoxaparin  40 mg SubCUTAneous Daily    insulin glargine  0.15 Units/kg SubCUTAneous Nightly    insulin lispro  0.05 Units/kg SubCUTAneous TID WC    insulin lispro  0-8 Units SubCUTAneous TID WC    insulin lispro  0-4 Units SubCUTAneous Nightly       Continuous Infusions:   sodium chloride 25 mL/hr at 01/02/24 1002    dextrose            Data Review:    Lab Results   Component Value Date    WBC 9.2 01/02/2024    HGB 8.6 (L) 01/02/2024    HCT 25.2 (L) 01/02/2024    MCV 86.7 01/02/2024     01/02/2024     Lab Results   Component Value Date    CREATININE 1.9 (H) 01/02/2024    BUN 31 (H) 01/02/2024     (L) 01/02/2024    K 3.8 01/02/2024

## 2024-01-02 NOTE — PROGRESS NOTES
A [place] stress test was completed on this patient as ordered. The patient tolerated the procedure well.  Awaiting stress imaging at this time.

## 2024-01-02 NOTE — PLAN OF CARE
Podiatric Surgery Plan of Care:    Await pathology report to determine length of ABX. Recommending wound care, ABX, and offloading for now. Stay non-weightbearing on the right foot.  Wound care orders have been placed.    Will continue to follow until pathology report finalized.     DAGMAR SánchezM

## 2024-01-02 NOTE — PROGRESS NOTES
[facility]   Daily Progress Note      Admit Date:  12/26/2023    Reason for follow up visit: Cardiomyopathy    CC: \"I feel okay. I have never had any chest pain or SOB.\"    69 y/o male with PMH notable for type II diabetes mellitus, osteomyelitis, HTN, and chronic renal insufficiency. Admitted from home with septic shock d/t diabeteic ulcer. Newly diagnosed with cardiomyopathy (EF 35-40%) and ischemic evaluation ([facility]) prior to discharge per Dr. Kaufman.    Interval History:  Pt. seen and examined; records reviewed  BP stable. Remains on room air  Remains on bedrest (unable to bear weight on R foot)  Denies chest pain, SOB, cough, palpitations or dizziness  Hgb 8.6 stable for now (down from 11 at admit)  Cr 1.9 (improving)  + R foot pain  Plan for event monitor at discharge    Subjective:  Pt with no acute overnight cardiac events.     Review of Systems:   Constitutional: no unanticipated weight loss. There's been no change in energy level, sleep pattern, or activity level.   No fevers, chills.   Eyes: No visual changes or diplopia. No scleral icterus.  ENT: No Headaches, hearing loss or vertigo. No mouth sores or sore throat.  Cardiovascular: as reviewed in HPI  Respiratory: No cough or wheezing, no sputum production. No hemoptysis.    Gastrointestinal: No abdominal pain, appetite loss, blood in stools. No change in bowel or bladder habits.  Genitourinary: No dysuria, trouble voiding, or hematuria.  Musculoskeletal:  + immobile d/t R foot surgery  Integumentary: No rash or pruritis.  Neurological: No headache, diplopia, change in muscle strength, numbness or tingling.   Psychiatric: No anxiety or depression.  Endocrine: No temperature intolerance. No excessive thirst, fluid intake, or urination. No tremor.  Hematologic/Lymphatic: No abnormal bruising or bleeding, blood clots or swollen lymph nodes.  Allergic/Immunologic: No nasal congestion or hives.    Objective:   /80   Pulse 99   Temp 98.2 °F  Neida and Dr. Kaufman    Time Based Itemization  A total of 50 minutes was spent on today's patient encounter.  If applicable, non-patient-facing activities:  ( X)Preparing to see the patient and reviewing records  ( X) Individual interpretation of results  ( X) Discussion or coordination of care with other health care professionals  ( X) Ordering of unique tests, medications, or procedures  ( X) Documentation within the EHR    Electronically signed by DIANE M ENZWEILER, APRN - CNP on 1/2/2024 at 8:49 AM

## 2024-01-02 NOTE — PROGRESS NOTES
Hospital Medicine Progress Note      Date of Admission: 12/26/2023  Hospital Day: 8    Chief Admission Complaint:  Rigors    Subjective:  no new c/o.     Presenting Admission History:         \"68 y.o. male who presented to [facility] with shaking.  PMHx significant for diabetes, hypertension, hyperlipidemia, diabetic foot ulcer, right foot toe amputation.  Patient states around 9 PM yesterday he started to develop significant shaking associated with nausea.  He states that shaking also bad that he was unable to do anything and decided to come to the ER.  Denies any fevers, chest pain, shortness of breath, cough, nausea or diarrhea, foot pain, presyncope or syncope.  Patient states that he has an ulcer on the bottom of his foot which has been draining recently.  He also had 1 episode of vomiting in the ER\"      Assessment/Plan:      Current Principal Problem:  Septic shock (HCC)      Osteomyelitis - clinically suspected w/ diabetic foot infection/ulcer.  Started on Vanco/Cefepime - continued.  Infectious Disease consulted and appreciated. Podiatry consulted and appreciated s/p OR 29 Dec for R foot debridement to bone w/ bone biopsy and I/D w/out complications.     Sepsis - w/ septic shock requiring central line, pressor support and ICU admission.  2nd to above.     Troponin elevation - c/w myocardial injury.  Likely 2nd to sepsis. Cardiology consulted and appreciated.        [] STEMI/NSTEMI  [x] Trop leak 2nd to ARF  [x] Chronic Myocardial Injury 2nd to CHF  [] Recent MI (with 4 weeks of admission) dated:     of unclear clinical significance w/out signs/sxs of active ischemia.  Documented and reviewed. Echo w/ reduced EF 35-40% dated this admission.  Likely in need of future ischemic w/up.    CHF - acute on sytolic failure w/ reduced EF 35-40% by Echo this admission.  Likely due to ischemia heart disease.  Continue diuresis as currently ordered w/ close monitoring of Renal function and electrolytes for ADR.

## 2024-01-02 NOTE — PLAN OF CARE
Problem: Safety - Adult  Goal: Free from fall injury  Outcome: Progressing  Note: Pt will remain free from falls throughout hospital stay. Fall precautions in place, bed alarm on, bed in lowest position with wheels locked and side rails 2/4 up. Room door open and hourly rounding completed. AVASYS in use. Will continue to monitor throughout shift.       Problem: Chronic Conditions and Co-morbidities  Goal: Patient's chronic conditions and co-morbidity symptoms are monitored and maintained or improved  Outcome: Progressing  Problem: Skin/Tissue Integrity  Goal: Absence of new skin breakdown  Description: 1.  Monitor for areas of redness and/or skin breakdown  2.  Assess vascular access sites hourly  3.  Every 4-6 hours minimum:  Change oxygen saturation probe site  4.  Every 4-6 hours:  If on nasal continuous positive airway pressure, respiratory therapy assess nares and determine need for appliance change or resting period.  Outcome: Progressing  Note: Pt is at risk for skin breakdown. Pt will have skin assessments every shift, encourage weight shifting in the bed, heels elevated off of the bed, and friction and shear prevented when possible. Will continue to monitor for signs of skin breakdown and enforce prevention measures.       Care Plan - Patient's Chronic Conditions and Co-Morbidity Symptoms are Monitored and Maintained or Improved: Monitor and assess patient's chronic conditions and comorbid symptoms for stability, deterioration, or improvement  Note: Pt will have accuchecks before meals and at bedtime with sliding scale insulin in place for coverage. Will continue to monitor for signs and symptoms of hypoglycemia and hyperglycemia throughout shift.       Problem: Pain  Goal: Verbalizes/displays adequate comfort level or baseline comfort level  Outcome: Progressing  Flowsheets (Taken 1/2/2024 0407 by Mariah Pedro RN)  Verbalizes/displays adequate comfort level or baseline comfort level: Encourage  patient to monitor pain and request assistance  Note: Pt will be satisfied with pain control. Pt uses numeric pain rating scale with reassessments after pain med administration. Will continue to monitor progression throughout shift.

## 2024-01-02 NOTE — TELEPHONE ENCOUNTER
Monitor company [employer]  Length of monitor 28 days  Monitor ordered by Dexter Junior MD   Patch ID  [id number]  Device number [id number]  Monitor given to Christian Muñoz RN.   Nurse to apply at the time of discharge.

## 2024-01-02 NOTE — PROGRESS NOTES
Wound care completed per order on Right foot. Surgical shoe at bedside. Patient aware of weight bearing restrictions.

## 2024-01-03 LAB
ANION GAP SERPL CALCULATED.3IONS-SCNC: 11 MMOL/L (ref 3–16)
BACTERIA BLD CULT ORG #2: ABNORMAL
BUN SERPL-MCNC: 27 MG/DL (ref 7–20)
CALCIUM SERPL-MCNC: 8.4 MG/DL (ref 8.3–10.6)
CHLORIDE SERPL-SCNC: 101 MMOL/L (ref 99–110)
CO2 SERPL-SCNC: 23 MMOL/L (ref 21–32)
CREAT SERPL-MCNC: 1.6 MG/DL (ref 0.8–1.3)
DEPRECATED RDW RBC AUTO: 13.1 % (ref 12.4–15.4)
GFR SERPLBLD CREATININE-BSD FMLA CKD-EPI: 47 ML/MIN/{1.73_M2}
GLUCOSE BLD-MCNC: 121 MG/DL (ref 70–99)
GLUCOSE BLD-MCNC: 126 MG/DL (ref 70–99)
GLUCOSE BLD-MCNC: 147 MG/DL (ref 70–99)
GLUCOSE BLD-MCNC: 153 MG/DL (ref 70–99)
GLUCOSE SERPL-MCNC: 141 MG/DL (ref 70–99)
HCT VFR BLD AUTO: 26.6 % (ref 40.5–52.5)
HGB BLD-MCNC: 9 G/DL (ref 13.5–17.5)
MCH RBC QN AUTO: 29.2 PG (ref 26–34)
MCHC RBC AUTO-ENTMCNC: 33.7 G/DL (ref 31–36)
MCV RBC AUTO: 86.6 FL (ref 80–100)
ORGANISM: ABNORMAL
PERFORMED ON: ABNORMAL
PLATELET # BLD AUTO: 320 K/UL (ref 135–450)
PMV BLD AUTO: 7.4 FL (ref 5–10.5)
POTASSIUM SERPL-SCNC: 3.7 MMOL/L (ref 3.5–5.1)
RBC # BLD AUTO: 3.07 M/UL (ref 4.2–5.9)
SODIUM SERPL-SCNC: 135 MMOL/L (ref 136–145)
WBC # BLD AUTO: 9.7 K/UL (ref 4–11)

## 2024-01-03 PROCEDURE — 36415 COLL VENOUS BLD VENIPUNCTURE: CPT

## 2024-01-03 PROCEDURE — 99232 SBSQ HOSP IP/OBS MODERATE 35: CPT | Performed by: INTERNAL MEDICINE

## 2024-01-03 PROCEDURE — 85027 COMPLETE CBC AUTOMATED: CPT

## 2024-01-03 PROCEDURE — 2580000003 HC RX 258: Performed by: INTERNAL MEDICINE

## 2024-01-03 PROCEDURE — 6360000002 HC RX W HCPCS: Performed by: INTERNAL MEDICINE

## 2024-01-03 PROCEDURE — 97110 THERAPEUTIC EXERCISES: CPT

## 2024-01-03 PROCEDURE — 6370000000 HC RX 637 (ALT 250 FOR IP)

## 2024-01-03 PROCEDURE — 2060000000 HC ICU INTERMEDIATE R&B

## 2024-01-03 PROCEDURE — 97535 SELF CARE MNGMENT TRAINING: CPT

## 2024-01-03 PROCEDURE — 99232 SBSQ HOSP IP/OBS MODERATE 35: CPT | Performed by: NURSE PRACTITIONER

## 2024-01-03 PROCEDURE — 97530 THERAPEUTIC ACTIVITIES: CPT

## 2024-01-03 PROCEDURE — 80048 BASIC METABOLIC PNL TOTAL CA: CPT

## 2024-01-03 PROCEDURE — 6370000000 HC RX 637 (ALT 250 FOR IP): Performed by: INTERNAL MEDICINE

## 2024-01-03 PROCEDURE — 2580000003 HC RX 258: Performed by: STUDENT IN AN ORGANIZED HEALTH CARE EDUCATION/TRAINING PROGRAM

## 2024-01-03 PROCEDURE — 6370000000 HC RX 637 (ALT 250 FOR IP): Performed by: NURSE PRACTITIONER

## 2024-01-03 RX ORDER — SODIUM CHLORIDE 9 MG/ML
INJECTION, SOLUTION INTRAVENOUS CONTINUOUS
Status: DISCONTINUED | OUTPATIENT
Start: 2024-01-03 | End: 2024-01-03

## 2024-01-03 RX ADMIN — Medication 5 ML: at 20:41

## 2024-01-03 RX ADMIN — FERROUS SULFATE TAB 325 MG (65 MG ELEMENTAL FE) 325 MG: 325 (65 FE) TAB at 08:25

## 2024-01-03 RX ADMIN — FLUTICASONE PROPIONATE 1 SPRAY: 50 SPRAY, METERED NASAL at 08:25

## 2024-01-03 RX ADMIN — FOLIC ACID 1 MG: 1 TABLET ORAL at 08:25

## 2024-01-03 RX ADMIN — Medication 10 ML: at 08:26

## 2024-01-03 RX ADMIN — INSULIN LISPRO 4 UNITS: 100 INJECTION, SOLUTION INTRAVENOUS; SUBCUTANEOUS at 17:24

## 2024-01-03 RX ADMIN — ENOXAPARIN SODIUM 40 MG: 100 INJECTION SUBCUTANEOUS at 12:32

## 2024-01-03 RX ADMIN — PIPERACILLIN AND TAZOBACTAM 3375 MG: 3; .375 INJECTION, POWDER, FOR SOLUTION INTRAVENOUS at 08:23

## 2024-01-03 RX ADMIN — GABAPENTIN 300 MG: 300 CAPSULE ORAL at 15:54

## 2024-01-03 RX ADMIN — INSULIN GLARGINE 13 UNITS: 100 INJECTION, SOLUTION SUBCUTANEOUS at 20:39

## 2024-01-03 RX ADMIN — METOPROLOL SUCCINATE 25 MG: 25 TABLET, EXTENDED RELEASE ORAL at 08:25

## 2024-01-03 RX ADMIN — ASPIRIN 81 MG: 81 TABLET, COATED ORAL at 08:25

## 2024-01-03 RX ADMIN — ROSUVASTATIN 10 MG: 10 TABLET, FILM COATED ORAL at 08:25

## 2024-01-03 RX ADMIN — GABAPENTIN 300 MG: 300 CAPSULE ORAL at 20:39

## 2024-01-03 RX ADMIN — INSULIN LISPRO 4 UNITS: 100 INJECTION, SOLUTION INTRAVENOUS; SUBCUTANEOUS at 12:57

## 2024-01-03 RX ADMIN — INSULIN LISPRO 4 UNITS: 100 INJECTION, SOLUTION INTRAVENOUS; SUBCUTANEOUS at 08:26

## 2024-01-03 RX ADMIN — GABAPENTIN 300 MG: 300 CAPSULE ORAL at 08:25

## 2024-01-03 RX ADMIN — SODIUM CHLORIDE: 9 INJECTION, SOLUTION INTRAVENOUS at 12:57

## 2024-01-03 RX ADMIN — PIPERACILLIN AND TAZOBACTAM 3375 MG: 3; .375 INJECTION, POWDER, FOR SOLUTION INTRAVENOUS at 15:57

## 2024-01-03 NOTE — PROGRESS NOTES
[facility]   Daily Progress Note      Admit Date:  12/26/2023    Reason for follow up visit: Cardiomyopathy    CC: \"I feel okay. Just want to get out of this bed. I do not have any chest pain or SOB.\"    69 y/o male with PMH notable for type II diabetes mellitus, osteomyelitis, HTN, and chronic renal insufficiency. Admitted from home with septic shock d/t diabeteic ulcer. Newly diagnosed with cardiomyopathy (EF 35-40%) and ischemic evaluation  prior to discharge per Dr. Kaufman. Pharmacologic Myoview was performed on 1/2/23.    Interval History:  Pt. seen and examined; records reviewed  BP stable.   Net diuresis: -2.3L since admit/-1.2 L last 24 hours  Denies chest pain, SOB, cough, palpitations or dizziness  Telemetry showing SR; occasional episodes of short lived tachycardia (? Atrial tachycardia)  Cr 1.6  and Hgb 9.0 this AM  Plan for event monitor at discharge and eventual Watchman eval (per EP)    Subjective:  Pt with no acute overnight cardiac events.     Review of Systems:   Constitutional: no unanticipated weight loss. There's been no change in energy level, sleep pattern, or activity level.   No fevers, chills.   Eyes: No visual changes or diplopia. No scleral icterus.  ENT: No Headaches, hearing loss or vertigo. No mouth sores or sore throat.  Cardiovascular: as reviewed in HPI  Respiratory: No cough or wheezing, no sputum production. No hemoptysis.    Gastrointestinal: No abdominal pain, appetite loss, blood in stools. No change in bowel or bladder habits.  Genitourinary: No dysuria, trouble voiding, or hematuria.  Musculoskeletal:   + R foot pain.  Integumentary: No rash or pruritis.  Neurological: No headache, diplopia, change in muscle strength, numbness or tingling.   Psychiatric: No anxiety or depression.  Endocrine: No temperature intolerance. No excessive thirst, fluid intake, or urination. No tremor.  Hematologic/Lymphatic: No abnormal bruising or bleeding, blood clots or swollen lymph

## 2024-01-03 NOTE — PROGRESS NOTES
Progress note    CC: chills and rigors  Summary:   Ant Carrasquillo is being seen by nephrology for Acute kidney injury (WOLF). He is a 68 y.o. male with a PMH significant for T2DM, hypertesnion, diabetic foot ulcer s/p right foot toe amputation who presented to the ED 12/26/2023 with shaking and nausea. He is noted to have ulcers on his right foot with concern for osteomyelitis.    Interval History  Seen at bedside  /89  On room air  Made 2350 mL urine yesterday without diuretics.  Cr continues to improve. Down to 1.6 today  Bicarb stable at 23 today  Na 135  Went to OR for foot debridement 12/29/2023 with podiatry    Plan:   - renal function continues to improve.  - euvolemic. No indication for diuretics at this time  - BP at goal  - continue current management.  - since not planned for [facility] will stop IVF      Brittany Cevallos MD  [facility]  Office: [phone]    Assessment:   WOLF:  - baseline Cr 0.9-1.2  - Cr on admission was 2.5  - associated with septic shock, hypotension  - UA with protein but otherwise bland  - WOLF 2/2 hemodynamic insult     Acute metabolic acidosis:  - 2/2 WOLF  - improved with medical management.     Septic shock:  - 2/2 diabetic right foot ulcer  - per primary     Hypertension:  - home regimen: enalapril 10 mg BID  - will hold.    NSTEMI:  - s/p stress test 1/2/2023, small apical and inferior reversible defect. Currently planning medical management.     Diabetic foot ulcer:  - per ID, podiatry.        PE:   Vitals:    01/03/24 1121   BP: 130/89   Pulse: 80   Resp:    Temp:    SpO2: 95%       General appearance: in NAD  Respiratory: Respiratory effort appears normal, bilateral equal chest rise, no wheeze, no crackles  Cardiovascular: Ausculation shows RRR no edema  Abdomen: No visible mass or tenderness, non distended.

## 2024-01-03 NOTE — PROGRESS NOTES
ONCOLOGY HEMATOLOGY CARE PROGRESS NOTE      SUBJECTIVE:    Re consult for evaluation of new SVT right leg evidence on doppler this AMJose Cain states he has pain with palpation of anterior tibial region but no pain in calf or with movement. No SOB.   Denies other concerns.     ROS:     Constitutional:  No weight loss, No fever, No chills, No night sweats.  Energy level good.  Eyes:  No impairment or change in vision  ENT / Mouth:  No pain, abnormal ulceration, bleeding, nasal drip or change in voice or hearing  Cardiovascular:  No chest pain, palpitations, new edema, or calf discomfort  Respiratory:  No pain, hemoptysis, change to breathing  Breast:  No pain, discharge, change in appearance or texture  Gastrointestinal:  No pain, cramping, jaundice, change to eating and bowel habits  Urinary:  No pain, bleeding or change in continence  Genitalia: No pain, bleeding or discharge  Musculoskeletal:  redness/bruising right anterior shin. No edema or weakness  Skin:  No pruritus, rash, change to nodules or lesions  Neurologic:  No discomfort, change in mental status, speech, sensory or motor activity  Psychiatric:  No change in concentration or change to affect or mood  Endocrine:  No hot flashes, increased thirst, or change to urine production  Hematologic: No petechiae, ecchymosis or bleeding  Lymphatic:  No lymphadenopathy or lymphedema  Allergy / Immunologic:  No eczema, hives, frequent or recurrent infections    OBJECTIVE        Physical    VITALS:  Patient Vitals for the past 24 hrs:   BP Temp Temp src Pulse Resp SpO2 Weight   01/03/24 1121 130/89 -- -- 80 -- 95 % --   01/03/24 0820 135/89 98.4 °F (36.9 °C) Oral 76 18 99 % --   01/03/24 0415 (!) 144/82 98.4 °F (36.9 °C) Oral 78 16 98 % --   01/03/24 0414 -- -- -- -- -- -- 87.5 kg (193 lb)   01/02/24 2340 136/84 98.1 °F (36.7 °C) Oral 76 18 100 % --   01/1955 131/79 98.5 °F (36.9 °C) Oral (!) 105 18 97 % --   01/02/24 1540  with diabetic polyneuropathy (HCC)    Essential hypertension    Neuropathy    Hyperlipidemia associated with type 2 diabetes mellitus (HCC)    Septic shock (HCC)    Ulcer of right foot, limited to breakdown of skin (HCC)    Overweight (BMI 25.0-29.9)    Hyponatremia    Acute kidney injury superimposed on CKD (HCC)    PAF (paroxysmal atrial fibrillation) (HCC)    Cardiomyopathy (HCC)    Elevated troponin    Osteomyelitis of right foot (HCC)       ASSESSMENT AND PLAN:    Anemia  - noted HGB of 11.2 g/dL on admission 12/26 down to 9.3 g/dL this AM. This is likely dilutional due to IV fluids in setting of septic shock   - Normocytic anemia noted, likely consumptive due to suspected ongoing infectious process   - no signs of active bleeding   - platelets and diff unremarkable  - B12 unremarkable at 1121  - folate low on 12/27/2023 at 3.42 ng/mL = starting folic acid supplement   - iron studies and ferritin still pending from 12/27/2023; spoke with lab this AM. Tube not sent 12/27 to lab for testing. This will be resent today   - iron studies: iron 15 ug/dL, TIBC 172, and iron sat 9%. Ferritin pending. Discussed with patient. started PO iron supplementation.   - ARELY negative, LDH slightly elevated, haptoglobin unremarkable   - EPO elevated at 68. Should consider retacrit after iron storage repletion if HGB not improved   - HGB up to 9.0 g/dL this AM   - transfusion indicated for HGB <7 g/dL     Septic Shock  Right Foot Infection   Leukocytosis   - unclear etiology, possibly secondary to chronic diabetic ulceration of foot and possible abscess/question of osteomyelitis   - s/p right hallux amputation 03/2020  - s/p debridement with bone biopsy and I&D on 12/29/2023   - on abx with dapto, cefepime   - per ID/IM      Superficial Venous Thrombosis   - venous doppler 01/03 shows acute totally occluding SVT involving the right greater saphenous vein in the distal calf. No other evidence of deep or superficial venous

## 2024-01-03 NOTE — CARE COORDINATION
Therapy rec's of SNF noted.  Spoke with patient at bedside for introduction.   Discussed rec's of SNF and patient is in agreement with this plan and would like  a referral to be made to [facility].  Placed call to Shaye with [facility] and notified of referral.   She states they will review and call back with whether or not they are able to accept.  No precert needed.        [id number] addendum: Received call from Shaye who states they are able to accept at discharge.

## 2024-01-03 NOTE — PROGRESS NOTES
Therapy Plan  Times Per Week: 3-5x/ week     Restrictions  Restrictions/Precautions  Restrictions/Precautions: Weight Bearing;Fall Risk;General Precautions;Seizure  Lower Extremity Weight Bearing Restrictions  Right Lower Extremity Weight Bearing: Non Weight Bearing  Partial Weight Bearing Percentage Or Pounds: Non weight bearing to Right leg.  Position Activity Restriction  Other position/activity restrictions: IV, telemetry, krishna, JIA per podiatry note 1/02    Subjective   Subjective  Subjective: Pt in bed at entry, agreeable to OT treatment.    Orientation  Overall Orientation Status: Within Functional Limits  Orientation Level: Oriented X4    Pain: reports no pain at rest    Cognition  Overall Cognitive Status: Exceptions  Arousal/Alertness: Delayed responses to stimuli  Following Commands: Inconsistently follows commands  Attention Span: Difficulty dividing attention  Memory: Decreased short term memory;Decreased recall of recent events;Decreased recall of precautions;Decreased long term memory  Safety Judgement: Decreased awareness of need for safety;Decreased awareness of need for assistance  Insights: Decreased awareness of deficits  Initiation: Requires cues for some  Sequencing: Requires cues for all        Objective    Vitals  Pulse: 80  Heart Rate Source: Monitor  BP: 130/89  BP Location: Left upper arm  BP Method: Automatic  Patient Position: Semi fowlers  MAP (Calculated): 103  SpO2: 95 %       Bed Mobility Training  Bed Mobility Training: Yes  Overall Level of Assistance: Additional time;Minimum assistance  Interventions: Safety awareness training;Verbal cues;Visual cues  Supine to Sit: Minimum assistance;Additional time (for trunk with HOB slightly elevated)  Sit to Supine: Other (not assessed, pt left in chair end of session)  Scooting: Contact-guard assistance;Additional time (EOB)    Balance  Sitting: Intact  Standing: Impaired (RW)  Standing - Static: Fair;Constant support  Standing - Dynamic:  Poor;Constant support    Transfer Training  Transfer Training: Yes (RW)  Overall Level of Assistance: Minimum assistance;Assist X2  Interventions: Safety awareness training;Verbal cues;Visual cues  Sit to Stand: Minimum assistance;Assist X2  Stand to Sit: Minimum assistance;Assist X2  Stand Pivot Transfers: Minimum assistance;Assist X2;Assist X1 (Ax1-2 for balance and cues for sequencing with NWB RLE, using RW)  Bed to Chair: Minimum assistance;Assist X2       ADL  LE Dressing: Maximum assistance  Toileting: Dependent/Total  Toileting Skilled Clinical Factors: krishna    OT Exercises  A/AROM Exercises: Pt demos overhead press, chest press, and bicep curls as therex he completes multiple times per day on his own. With education and instruction pt demos 5x chair push ups (pt with difficulty executing without wanting to complete full sit to stand); 15x shoulder flexion, shoulder rolls forward and backward       Safety Devices  Type of Devices: Left in chair;Chair alarm in place;Call light within reach;Nurse notified;Gait belt;Patient at risk for falls      Patient Education  Education Given To: Patient  Education Provided: Role of Therapy;Plan of Care;Home Exercise Program;Precautions;Transfer Training;Fall Prevention Strategies;Equipment  Education Method: Verbal;Demonstration  Barriers to Learning: Cognition  Education Outcome: Verbalized understanding;Continued education needed    Goals  Short Term Goals  Time Frame for Short Term Goals: 1 week(01-06-23) -- goals ongoing 1/3/24  Short Term Goal 1: mod assist of 1 stand-pivot transfers with walker by 01-06-24  Short Term Goal 2: tolerate 2 sets of 15 reps BUE strengthening exercises to increase strength for transfers/NWB Right LE  Short Term Goal 3: set up for UE self care by 01-04-24  Patient Goals   Patient goals : \"be able to get up to chair\"    AM-PAC - ADL  AM-PAC Daily Activity - Inpatient   How much help is needed for putting on and taking off regular lower body

## 2024-01-03 NOTE — PROGRESS NOTES
Infectious Disease Follow up Notes    CC :  [facility]      Antibiotics:  Zosyn 3.375 q8    Admit Date:   12/26/2023  Hospital Day: 9    Subjective:   He remains AF on RA   Discomfort from the krishna.  Otherwise without complaints.     Objective:     Patient Vitals for the past 8 hrs:   BP Temp Temp src Pulse Resp SpO2   01/03/24 1121 130/89 -- -- 80 -- 95 %   01/03/24 0820 135/89 98.4 °F (36.9 °C) Oral 76 18 99 %         EXAM:  General:   alert, NAD    HEENT:   NCAT, PERRL, sclera anicteric  NECK:   supple  LUNGS:    non-labored breathing      ABD: Soft, flat, NT   EXT: Focal erythema lower R leg with tenderness  Bulky dressing R foot   SKIN: No focal rash         LINE:   PIV in place         Scheduled Meds:   metoprolol succinate  25 mg Oral Daily    piperacillin-tazobactam  3,375 mg IntraVENous Q8H    fluticasone  1 spray Each Nostril Daily    folic acid  1 mg Oral Daily    ferrous sulfate  325 mg Oral Daily with breakfast    aspirin EC  81 mg Oral Daily    gabapentin  300 mg Oral TID    rosuvastatin  10 mg Oral Daily    sodium chloride flush  5-40 mL IntraVENous 2 times per day    enoxaparin  40 mg SubCUTAneous Daily    insulin glargine  0.15 Units/kg SubCUTAneous Nightly    insulin lispro  0.05 Units/kg SubCUTAneous TID WC    insulin lispro  0-8 Units SubCUTAneous TID WC    insulin lispro  0-4 Units SubCUTAneous Nightly       Continuous Infusions:   sodium chloride Stopped (01/03/24 1426)    sodium chloride 25 mL/hr at 01/02/24 1629    dextrose            Data Review:    Lab Results   Component Value Date    WBC 9.7 01/03/2024    HGB 9.0 (L) 01/03/2024    HCT 26.6 (L) 01/03/2024    MCV 86.6 01/03/2024     01/03/2024     Lab Results   Component Value Date    CREATININE 1.6 (H) 01/03/2024    BUN 27 (H) 01/03/2024     (L) 01/03/2024    K 3.7 01/03/2024     01/03/2024    CO2 23 01/03/2024       Hepatic Function Panel:   Lab

## 2024-01-03 NOTE — PLAN OF CARE
Problem: Discharge Planning  Goal: Discharge to home or other facility with appropriate resources  Outcome: Progressing     Problem: Safety - Adult  Goal: Free from fall injury  Outcome: Progressing     Problem: Chronic Conditions and Co-morbidities  Goal: Patient's chronic conditions and co-morbidity symptoms are monitored and maintained or improved  Outcome: Progressing     Problem: Pain  Goal: Verbalizes/displays adequate comfort level or baseline comfort level  Outcome: Progressing     Problem: Confusion  Goal: Confusion, delirium, dementia, or psychosis is improved or at baseline  Description: INTERVENTIONS:  1. Assess for possible contributors to thought disturbance, including medications, impaired vision or hearing, underlying metabolic abnormalities, dehydration, psychiatric diagnoses, and notify attending LIP  2. [place] high risk fall precautions, as indicated  3. Provide frequent short contacts to provide reality reorientation, refocusing and direction  4. Decrease environmental stimuli, including noise as appropriate  5. Monitor and intervene to maintain adequate nutrition, hydration, elimination, sleep and activity  6. If unable to ensure safety without constant attention obtain sitter and review sitter guidelines with assigned personnel  7. Initiate Psychosocial CNS and Spiritual Care consult, as indicated  Outcome: Progressing     Problem: Skin/Tissue Integrity  Goal: Absence of new skin breakdown  Description: 1.  Monitor for areas of redness and/or skin breakdown  2.  Assess vascular access sites hourly  3.  Every 4-6 hours minimum:  Change oxygen saturation probe site  4.  Every 4-6 hours:  If on nasal continuous positive airway pressure, respiratory therapy assess nares and determine need for appliance change or resting period.  Outcome: Progressing

## 2024-01-03 NOTE — PROGRESS NOTES
Shallow soft tissue ulceration also seen plantar to the distal aspect of the 1st proximal phalanx with underlying subcutaneous edema.  No drainable fluid collection identified.  No abnormal soft tissue mass.  Severe fatty atrophy of the forefoot musculature. TENDONS: No tenosynovitis.     1. Shallow soft tissue ulcerations plantar to the medial cuneiform and distal aspect of the 1st proximal phalanx with underlying subcutaneous edema compatible with cellulitis.  No drainable fluid collection identified. 2. Mild marrow edema in the distal aspect of the 1st proximal phalanx compatible with noninfectious reactive osteitis versus early osteomyelitis. 3. Significant marrow edema throughout the medial cuneiform partially obscured by susceptibility artifact. Differential diagnosis includes osteomyelitis versus edema related to postoperative and degenerative changes. 4. Status post 1st tarsometatarsal arthrodesis. Severe degenerative changes of the joint without significant osseous fusion identified.       CBC:   Recent Labs     01/02/24  0421 01/03/24  0418   WBC 9.2 9.7   HGB 8.6* 9.0*    320     BMP:    Recent Labs     01/01/24  0939 01/02/24  0421 01/03/24  0418   * 133* 135*   K 3.7 3.8 3.7    100 101   CO2 21 23 23   BUN 35* 31* 27*   CREATININE 2.1* 1.9* 1.6*   GLUCOSE 270* 166* 141*     Hepatic: No results for input(s): \"AST\", \"ALT\", \"ALB\", \"BILITOT\", \"ALKPHOS\" in the last 72 hours.  Lipids:   Lab Results   Component Value Date/Time    CHOL 103 04/03/2023 12:00 AM    HDL 34 04/03/2023 12:00 AM    TRIG 107 04/03/2023 12:00 AM     Hemoglobin A1C:   Lab Results   Component Value Date/Time    LABA1C 7.8 12/28/2023 03:53 AM     TSH: No results found for: \"TSH\"  Troponin: No results found for: \"TROPONINT\"  Lactic Acid: No results for input(s): \"LACTA\" in the last 72 hours.  BNP: No results for input(s): \"PROBNP\" in the last 72 hours.  UA:  Lab Results   Component Value Date/Time    NITRU Negative  12/27/2023 06:25 AM    COLORU Yellow 12/27/2023 06:25 AM    PHUR 5.5 12/27/2023 06:25 AM    WBCUA 0-2 12/27/2023 06:25 AM    RBCUA 0-2 12/27/2023 06:25 AM    CLARITYU SL CLOUDY 12/27/2023 06:25 AM    SPECGRAV >=1.030 12/27/2023 06:25 AM    LEUKOCYTESUR Negative 12/27/2023 06:25 AM    UROBILINOGEN 0.2 12/27/2023 06:25 AM    BILIRUBINUR SMALL 12/27/2023 06:25 AM    BLOODU Negative 12/27/2023 06:25 AM    GLUCOSEU Negative 12/27/2023 06:25 AM    KETUA TRACE 12/27/2023 06:25 AM    AMORPHOUS 3+ 12/27/2023 06:25 AM     Urine Cultures: No results found for: \"LABURIN\"  Blood Cultures:   Lab Results   Component Value Date/Time    BC  12/26/2023 11:00 PM     Organism identification not detected by PCR (Film Array)  See additional report for complete BCID panel  Organism identification not detected by PCR (Film Array)  See additional report for complete BCID panel      BC  12/26/2023 11:00 PM     POSITIVE for  Isolated one of two sets  Sensitivities not routinely done. Drugs of choice are:  Ampicillin, Augmentin, Ceftriaxone, or Cefepime.      BC  12/26/2023 11:00 PM     POSITIVE for  No further workup  Isolated one of two sets       Lab Results   Component Value Date/Time    BLOODCULT2  12/26/2023 11:15 PM     Gram stain Anaerobic bottle:  Gram variable rods  Information to follow       Organism:   Lab Results   Component Value Date/Time    ORG Strep agalactiae (Beta Strep Group B) 12/29/2023 08:30 AM    ORG Coagulase Negative Staphylococci 12/29/2023 08:30 AM         Electronically signed by Destin Elaine MD on 1/3/2024 at 12:56 PM

## 2024-01-03 NOTE — PROGRESS NOTES
Podiatry Progress Note      Subjective:              The patient is a 68 y.o. male who is s/p right foot excisional debridement to bone with bone biopsy of midfoot and hallux. Reports no pain. Resting comfortably. Pathology returned with osteomyelitis, both acute and chronic to the hallux and no osteomyelitis present directly beneath the midfoot ulcer.    Past Medical History:        Diagnosis Date    Diabetes mellitus (HCC)     Hyperlipidemia     Hypertension        Past Surgical History:        Procedure Laterality Date    APPENDECTOMY      COLONOSCOPY      EYE SURGERY Left     lasik    FOOT DEBRIDEMENT Right 12/29/2023    FOOT DEBRIDEMENT TO BONE WITH BONE BIOPSY  INCISION AND DRAINAGE RIGHT FOOT performed by Scout Camp DPM at [facility] OR    FOOT SURGERY Right 2017    big toe    TOE AMPUTATION Right 3/3/2020    AMPUTATION OF RIGHT GREAT TOE performed by Candi Nixon DPM at [facility] OR       Current Medications:    Current Facility-Administered Medications: metoprolol succinate (TOPROL XL) extended release tablet 25 mg, 25 mg, Oral, Daily  piperacillin-tazobactam (ZOSYN) 3,375 mg in sodium chloride 0.9 % 50 mL IVPB (mini-bag), 3,375 mg, IntraVENous, Q8H  fluticasone (FLONASE) 50 MCG/ACT nasal spray 1 spray, 1 spray, Each Nostril, Daily  oxyCODONE-acetaminophen (PERCOCET) 5-325 MG per tablet 1 tablet, 1 tablet, Oral, Q4H PRN  folic acid (FOLVITE) tablet 1 mg, 1 mg, Oral, Daily  ferrous sulfate (IRON 325) tablet 325 mg, 325 mg, Oral, Daily with breakfast  aspirin EC tablet 81 mg, 81 mg, Oral, Daily  gabapentin (NEURONTIN) capsule 300 mg, 300 mg, Oral, TID  rosuvastatin (CRESTOR) tablet 10 mg, 10 mg, Oral, Daily  sodium chloride flush 0.9 % injection 5-40 mL, 5-40 mL, IntraVENous, 2 times per day  sodium chloride flush 0.9 % injection 5-40 mL, 5-40 mL, IntraVENous, PRN  0.9 % sodium chloride infusion, , IntraVENous, PRN  enoxaparin (LOVENOX) injection 40 mg, 40 mg, SubCUTAneous, Daily  acetaminophen (TYLENOL)

## 2024-01-03 NOTE — PROGRESS NOTES
Physical Therapy  Facility/Department: [facility] A2 CARD TELEMETRY  Daily Treatment Note  NAME: Ant Carrasquillo  : 1955  MRN: [id number]    Date of Service: 1/3/2024    Discharge Recommendations:  Subacute/Skilled Nursing Facility   PT Equipment Recommendations  Equipment Needed: No  Other: Defer to next level of care    Therapy discharge recommendations take into account each patient's current medical complexities and are subject to input/oversight from the patient's healthcare team.   Barriers to Home Discharge:   [] Steps to access home entry or bed/bath:   [x] Unable to transfer, ambulate, or propel wheelchair household distances without assist   [x] Limited available assist at home upon discharge    [] Patient or family requests d/c to post-acute facility    [x] Poor cognition/safety awareness for d/c to home alone    [x]Unable to maintain ordered weight bearing status    [] Patient with salient signs of long-standing immobility   [x] Patient is at risk for falls    [] Other:    If pt is unable to be seen after this session, please let this note serve as discharge summary.  Please see case management note for discharge disposition.  Thank you.      Patient Diagnosis(es): The primary encounter diagnosis was Septic shock (HCC). Diagnoses of Osteomyelitis of right foot, unspecified type (HCC), PAF (paroxysmal atrial fibrillation) ([facility]), and Other cardiomyopathy ([facility]) were also pertinent to this visit.    Assessment   Assessment: Pt seen as cotx with OT in order to safely maximize functional mobility towards goals. Pt limited during session by WB precautions and fatigue, but performs bed mobility with min Ax1, and transfers with min Ax2 using RW and maintaining NWB precautions. Pt continues to require frequent cues for WB precautions and sequencing of mobility, but appeared to perform better with NWB vs heel WB with practice. Pt also performs 5 steps fwd/bkwd with hopping pattern and CGA-min Ax1-2 for  place;Gait belt  Restraints  Restraints Initially in Place: No       Goals  Short Term Goals  Time Frame for Short Term Goals: 1/06/23  Short Term Goal 1: pt will complete bed mobility with SBA  Short Term Goal 2: pt will complete sit<>stand with min A  Short Term Goal 3: pt will complete pivot transfer with mod A  Short Term Goal 4: pt will participate in 10-12 reps of BLE exercises by 1/02/23 - MET 1/03  Additional Goals?: No  Patient Goals   Patient Goals : \"to go home\"    Education  Patient Education  Education Given To: Patient  Education Provided: Role of Therapy;Plan of Care;Equipment;Precautions;Transfer Training;Orientation  Education Provided Comments: importance of mobility and therex, WB precautions  Education Method: Demonstration;Verbal  Barriers to Learning: Cognition  Education Outcome: Continued education needed;Verbalized understanding    AM-PAC - Mobility    AM-PAC Basic Mobility - Inpatient   How much help is needed turning from your back to your side while in a flat bed without using bedrails?: A Little  How much help is needed moving from lying on your back to sitting on the side of a flat bed without using bedrails?: A Little  How much help is needed moving to and from a bed to a chair?: A Lot  How much help is needed standing up from a chair using your arms?: A Little  How much help is needed walking in hospital room?: A Lot  How much help is needed climbing 3-5 steps with a railing?: Total  AM-PAC Inpatient Mobility Raw Score : 14  AM-PAC Inpatient T-Scale Score : 38.1  Mobility Inpatient CMS 0-100% Score: 61.29  Mobility Inpatient CMS G-Code Modifier : CL         Therapy Time   Individual Concurrent Group Co-treatment   Time In       1118   Time Out       1200   Minutes       42   Timed Code Treatment Minutes: 42 Minutes       Hector Hebert, PT, DPT

## 2024-01-04 ENCOUNTER — ANESTHESIA (OUTPATIENT)
Dept: OPERATING ROOM | Age: 69
End: 2024-01-04
Payer: MEDICARE

## 2024-01-04 ENCOUNTER — APPOINTMENT (OUTPATIENT)
Dept: GENERAL RADIOLOGY | Age: 69
End: 2024-01-04
Payer: MEDICARE

## 2024-01-04 ENCOUNTER — ANESTHESIA EVENT (OUTPATIENT)
Dept: OPERATING ROOM | Age: 69
End: 2024-01-04
Payer: MEDICARE

## 2024-01-04 LAB
GLUCOSE BLD-MCNC: 147 MG/DL (ref 70–99)
GLUCOSE BLD-MCNC: 156 MG/DL (ref 70–99)
GLUCOSE BLD-MCNC: 313 MG/DL (ref 70–99)
GLUCOSE BLD-MCNC: 355 MG/DL (ref 70–99)
PERFORMED ON: ABNORMAL

## 2024-01-04 PROCEDURE — 2500000003 HC RX 250 WO HCPCS: Performed by: PODIATRIST

## 2024-01-04 PROCEDURE — 97116 GAIT TRAINING THERAPY: CPT

## 2024-01-04 PROCEDURE — 2580000003 HC RX 258: Performed by: PODIATRIST

## 2024-01-04 PROCEDURE — 6360000002 HC RX W HCPCS: Performed by: PODIATRIST

## 2024-01-04 PROCEDURE — 2580000003 HC RX 258: Performed by: NURSE ANESTHETIST, CERTIFIED REGISTERED

## 2024-01-04 PROCEDURE — 88311 DECALCIFY TISSUE: CPT

## 2024-01-04 PROCEDURE — 2580000003 HC RX 258: Performed by: INTERNAL MEDICINE

## 2024-01-04 PROCEDURE — 2700000000 HC OXYGEN THERAPY PER DAY

## 2024-01-04 PROCEDURE — 73630 X-RAY EXAM OF FOOT: CPT

## 2024-01-04 PROCEDURE — 3600000014 HC SURGERY LEVEL 4 ADDTL 15MIN: Performed by: PODIATRIST

## 2024-01-04 PROCEDURE — 6360000002 HC RX W HCPCS: Performed by: INTERNAL MEDICINE

## 2024-01-04 PROCEDURE — 3700000001 HC ADD 15 MINUTES (ANESTHESIA): Performed by: PODIATRIST

## 2024-01-04 PROCEDURE — 3600000004 HC SURGERY LEVEL 4 BASE: Performed by: PODIATRIST

## 2024-01-04 PROCEDURE — 97168 OT RE-EVAL EST PLAN CARE: CPT

## 2024-01-04 PROCEDURE — 2500000003 HC RX 250 WO HCPCS: Performed by: NURSE ANESTHETIST, CERTIFIED REGISTERED

## 2024-01-04 PROCEDURE — 2709999900 HC NON-CHARGEABLE SUPPLY: Performed by: PODIATRIST

## 2024-01-04 PROCEDURE — 6370000000 HC RX 637 (ALT 250 FOR IP): Performed by: INTERNAL MEDICINE

## 2024-01-04 PROCEDURE — 6360000002 HC RX W HCPCS: Performed by: NURSE ANESTHETIST, CERTIFIED REGISTERED

## 2024-01-04 PROCEDURE — 94761 N-INVAS EAR/PLS OXIMETRY MLT: CPT

## 2024-01-04 PROCEDURE — 97530 THERAPEUTIC ACTIVITIES: CPT

## 2024-01-04 PROCEDURE — 6370000000 HC RX 637 (ALT 250 FOR IP): Performed by: PODIATRIST

## 2024-01-04 PROCEDURE — 7100000001 HC PACU RECOVERY - ADDTL 15 MIN: Performed by: PODIATRIST

## 2024-01-04 PROCEDURE — 6370000000 HC RX 637 (ALT 250 FOR IP): Performed by: NURSE PRACTITIONER

## 2024-01-04 PROCEDURE — 97164 PT RE-EVAL EST PLAN CARE: CPT

## 2024-01-04 PROCEDURE — 1200000000 HC SEMI PRIVATE

## 2024-01-04 PROCEDURE — 6370000000 HC RX 637 (ALT 250 FOR IP)

## 2024-01-04 PROCEDURE — 7100000000 HC PACU RECOVERY - FIRST 15 MIN: Performed by: PODIATRIST

## 2024-01-04 PROCEDURE — 97535 SELF CARE MNGMENT TRAINING: CPT

## 2024-01-04 PROCEDURE — 3700000000 HC ANESTHESIA ATTENDED CARE: Performed by: PODIATRIST

## 2024-01-04 PROCEDURE — 0Y6P0Z0 DETACHMENT AT RIGHT 1ST TOE, COMPLETE, OPEN APPROACH: ICD-10-PCS | Performed by: PODIATRIST

## 2024-01-04 PROCEDURE — 88305 TISSUE EXAM BY PATHOLOGIST: CPT

## 2024-01-04 RX ORDER — PROPOFOL 10 MG/ML
INJECTION, EMULSION INTRAVENOUS PRN
Status: DISCONTINUED | OUTPATIENT
Start: 2024-01-04 | End: 2024-01-04 | Stop reason: SDUPTHER

## 2024-01-04 RX ORDER — ONDANSETRON 2 MG/ML
INJECTION INTRAMUSCULAR; INTRAVENOUS PRN
Status: DISCONTINUED | OUTPATIENT
Start: 2024-01-04 | End: 2024-01-04 | Stop reason: SDUPTHER

## 2024-01-04 RX ORDER — SODIUM CHLORIDE 0.9 % (FLUSH) 0.9 %
5-40 SYRINGE (ML) INJECTION PRN
Status: DISCONTINUED | OUTPATIENT
Start: 2024-01-04 | End: 2024-01-04 | Stop reason: HOSPADM

## 2024-01-04 RX ORDER — DIPHENHYDRAMINE HYDROCHLORIDE 50 MG/ML
6.25 INJECTION INTRAMUSCULAR; INTRAVENOUS
Status: DISCONTINUED | OUTPATIENT
Start: 2024-01-04 | End: 2024-01-04 | Stop reason: HOSPADM

## 2024-01-04 RX ORDER — MIDAZOLAM HYDROCHLORIDE 1 MG/ML
2 INJECTION INTRAMUSCULAR; INTRAVENOUS
Status: DISCONTINUED | OUTPATIENT
Start: 2024-01-04 | End: 2024-01-04 | Stop reason: HOSPADM

## 2024-01-04 RX ORDER — EPHEDRINE SULFATE 50 MG/ML
INJECTION INTRAVENOUS PRN
Status: DISCONTINUED | OUTPATIENT
Start: 2024-01-04 | End: 2024-01-04 | Stop reason: SDUPTHER

## 2024-01-04 RX ORDER — FENTANYL CITRATE 50 UG/ML
INJECTION, SOLUTION INTRAMUSCULAR; INTRAVENOUS PRN
Status: DISCONTINUED | OUTPATIENT
Start: 2024-01-04 | End: 2024-01-04 | Stop reason: SDUPTHER

## 2024-01-04 RX ORDER — SODIUM CHLORIDE, SODIUM LACTATE, POTASSIUM CHLORIDE, CALCIUM CHLORIDE 600; 310; 30; 20 MG/100ML; MG/100ML; MG/100ML; MG/100ML
INJECTION, SOLUTION INTRAVENOUS CONTINUOUS PRN
Status: DISCONTINUED | OUTPATIENT
Start: 2024-01-04 | End: 2024-01-04 | Stop reason: SDUPTHER

## 2024-01-04 RX ORDER — SODIUM CHLORIDE 9 MG/ML
INJECTION, SOLUTION INTRAVENOUS PRN
Status: DISCONTINUED | OUTPATIENT
Start: 2024-01-04 | End: 2024-01-04 | Stop reason: HOSPADM

## 2024-01-04 RX ORDER — DEXAMETHASONE SODIUM PHOSPHATE 4 MG/ML
INJECTION, SOLUTION INTRA-ARTICULAR; INTRALESIONAL; INTRAMUSCULAR; INTRAVENOUS; SOFT TISSUE PRN
Status: DISCONTINUED | OUTPATIENT
Start: 2024-01-04 | End: 2024-01-04 | Stop reason: SDUPTHER

## 2024-01-04 RX ORDER — MEPERIDINE HYDROCHLORIDE 50 MG/ML
12.5 INJECTION INTRAMUSCULAR; INTRAVENOUS; SUBCUTANEOUS EVERY 5 MIN PRN
Status: DISCONTINUED | OUTPATIENT
Start: 2024-01-04 | End: 2024-01-04 | Stop reason: HOSPADM

## 2024-01-04 RX ORDER — ONDANSETRON 2 MG/ML
4 INJECTION INTRAMUSCULAR; INTRAVENOUS ONCE
Status: DISCONTINUED | OUTPATIENT
Start: 2024-01-04 | End: 2024-01-04 | Stop reason: HOSPADM

## 2024-01-04 RX ORDER — OXYCODONE HYDROCHLORIDE 5 MG/1
10 TABLET ORAL PRN
Status: DISCONTINUED | OUTPATIENT
Start: 2024-01-04 | End: 2024-01-04 | Stop reason: HOSPADM

## 2024-01-04 RX ORDER — LIDOCAINE HYDROCHLORIDE 20 MG/ML
INJECTION, SOLUTION INFILTRATION; PERINEURAL PRN
Status: DISCONTINUED | OUTPATIENT
Start: 2024-01-04 | End: 2024-01-04 | Stop reason: SDUPTHER

## 2024-01-04 RX ORDER — SODIUM CHLORIDE 0.9 % (FLUSH) 0.9 %
5-40 SYRINGE (ML) INJECTION EVERY 12 HOURS SCHEDULED
Status: DISCONTINUED | OUTPATIENT
Start: 2024-01-04 | End: 2024-01-04 | Stop reason: HOSPADM

## 2024-01-04 RX ORDER — OXYCODONE HYDROCHLORIDE 5 MG/1
5 TABLET ORAL PRN
Status: DISCONTINUED | OUTPATIENT
Start: 2024-01-04 | End: 2024-01-04 | Stop reason: HOSPADM

## 2024-01-04 RX ADMIN — Medication 5 ML: at 22:25

## 2024-01-04 RX ADMIN — PIPERACILLIN AND TAZOBACTAM 3375 MG: 3; .375 INJECTION, POWDER, FOR SOLUTION INTRAVENOUS at 00:14

## 2024-01-04 RX ADMIN — PROPOFOL 50 MG: 10 INJECTION, EMULSION INTRAVENOUS at 09:02

## 2024-01-04 RX ADMIN — SODIUM CHLORIDE, SODIUM LACTATE, POTASSIUM CHLORIDE, AND CALCIUM CHLORIDE: .6; .31; .03; .02 INJECTION, SOLUTION INTRAVENOUS at 08:50

## 2024-01-04 RX ADMIN — FENTANYL CITRATE 50 MCG: 50 INJECTION, SOLUTION INTRAMUSCULAR; INTRAVENOUS at 09:06

## 2024-01-04 RX ADMIN — INSULIN LISPRO 4 UNITS: 100 INJECTION, SOLUTION INTRAVENOUS; SUBCUTANEOUS at 22:24

## 2024-01-04 RX ADMIN — EPHEDRINE SULFATE 5 MG: 50 INJECTION INTRAVENOUS at 09:23

## 2024-01-04 RX ADMIN — GABAPENTIN 300 MG: 300 CAPSULE ORAL at 15:38

## 2024-01-04 RX ADMIN — GABAPENTIN 300 MG: 300 CAPSULE ORAL at 22:23

## 2024-01-04 RX ADMIN — INSULIN GLARGINE 13 UNITS: 100 INJECTION, SOLUTION SUBCUTANEOUS at 22:24

## 2024-01-04 RX ADMIN — PIPERACILLIN AND TAZOBACTAM 3375 MG: 3; .375 INJECTION, POWDER, FOR SOLUTION INTRAVENOUS at 07:51

## 2024-01-04 RX ADMIN — PROPOFOL 150 MG: 10 INJECTION, EMULSION INTRAVENOUS at 08:58

## 2024-01-04 RX ADMIN — ROSUVASTATIN 10 MG: 10 TABLET, FILM COATED ORAL at 07:47

## 2024-01-04 RX ADMIN — EPHEDRINE SULFATE 5 MG: 50 INJECTION INTRAVENOUS at 09:19

## 2024-01-04 RX ADMIN — GABAPENTIN 300 MG: 300 CAPSULE ORAL at 07:47

## 2024-01-04 RX ADMIN — LIDOCAINE HYDROCHLORIDE 80 MG: 20 INJECTION, SOLUTION INFILTRATION; PERINEURAL at 08:58

## 2024-01-04 RX ADMIN — INSULIN LISPRO 4 UNITS: 100 INJECTION, SOLUTION INTRAVENOUS; SUBCUTANEOUS at 11:30

## 2024-01-04 RX ADMIN — METOPROLOL SUCCINATE 25 MG: 25 TABLET, EXTENDED RELEASE ORAL at 07:48

## 2024-01-04 RX ADMIN — EPHEDRINE SULFATE 5 MG: 50 INJECTION INTRAVENOUS at 09:17

## 2024-01-04 RX ADMIN — ONDANSETRON 4 MG: 2 INJECTION INTRAMUSCULAR; INTRAVENOUS at 09:20

## 2024-01-04 RX ADMIN — PIPERACILLIN AND TAZOBACTAM 3375 MG: 3; .375 INJECTION, POWDER, FOR SOLUTION INTRAVENOUS at 16:31

## 2024-01-04 RX ADMIN — FERROUS SULFATE TAB 325 MG (65 MG ELEMENTAL FE) 325 MG: 325 (65 FE) TAB at 07:48

## 2024-01-04 RX ADMIN — INSULIN LISPRO 6 UNITS: 100 INJECTION, SOLUTION INTRAVENOUS; SUBCUTANEOUS at 17:02

## 2024-01-04 RX ADMIN — FENTANYL CITRATE 50 MCG: 50 INJECTION, SOLUTION INTRAMUSCULAR; INTRAVENOUS at 09:03

## 2024-01-04 RX ADMIN — INSULIN LISPRO 4 UNITS: 100 INJECTION, SOLUTION INTRAVENOUS; SUBCUTANEOUS at 17:01

## 2024-01-04 RX ADMIN — DEXAMETHASONE SODIUM PHOSPHATE 4 MG: 4 INJECTION, SOLUTION INTRAMUSCULAR; INTRAVENOUS at 09:20

## 2024-01-04 RX ADMIN — FOLIC ACID 1 MG: 1 TABLET ORAL at 07:48

## 2024-01-04 ASSESSMENT — PAIN - FUNCTIONAL ASSESSMENT: PAIN_FUNCTIONAL_ASSESSMENT: 0-10

## 2024-01-04 NOTE — PROGRESS NOTES
Patient to PACU bay 4. VSS on 3L O2 NC. Patient awake and reports no pain. Right foot is ACE wrapped with no drainage present. Patient able to wiggle toes and ankle on right foot. Will continue to monitor.

## 2024-01-04 NOTE — PROGRESS NOTES
Physical Therapy  Facility/Department: [facility] A2 CARD TELEMETRY  Physical Therapy Re-evaluation/Treatment     Name: Ant Carrasquillo  : 1955  MRN: [id number]  Date of Service: 2024    Discharge Recommendations:  Subacute/Skilled Nursing Facility, Continue to assess pending progress, 24 hour supervision or assist, Home with Home health PT   PT Equipment Recommendations  Equipment Needed: No  Other: Defer to next level of care      Patient Diagnosis(es): The primary encounter diagnosis was Septic shock (HCC). Diagnoses of Osteomyelitis of right foot, unspecified type (HCC), PAF (paroxysmal atrial fibrillation) (HCC), and Other cardiomyopathy (HCC) were also pertinent to this visit.  Past Medical History:  has a past medical history of Diabetes mellitus (HCC), Hyperlipidemia, and Hypertension.  Past Surgical History:  has a past surgical history that includes Foot surgery (Right, ); Colonoscopy; eye surgery (Left); Appendectomy; Toe amputation (Right, 3/3/2020); and Foot Debridement (Right, 2023).    Assessment   Body Structures, Functions, Activity Limitations Requiring Skilled Therapeutic Intervention: Decreased functional mobility ;Decreased cognition;Decreased endurance;Decreased posture;Decreased balance;Decreased safe awareness;Decreased strength;Decreased ROM  Assessment: Pt see for re-evaultion post-op R hallux amputation. PTA pt lives in a single story house with with wife with no BRIGIDA. Pt was independent with transfers and ambulation with no AD. Pt able to complete bed mobility with supervision, transfers with CGA to SBA, and ambulate with step to pattern and maintain heel WB precautions with CGA progressing to SBA and using a RW. Pt will continue to benefit from skilled PT services while in acute setting. SNF vs home with 24/7 and HHPT at ID pending progress. Co-tx collaboration this date to safely meet goals and will have better occupational performance outcomes with in a co-treatment  Yes  Right Side Weight Bearing: Partial (%) (Heel WB with surgical shoe)  Gait  Overall Level of Assistance: Contact-guard assistance;Assist X1  Distance (ft): 20 Feet (20+20)  Assistive Device: Gait belt;Walker, rolling  Interventions: Verbal cues;Visual cues;Safety awareness training;Tactile cues;Weight shifting training/pressure relief  Base of Support: Shift to left  Step Length: Left shortened  Gait Abnormalities: ;Decreased step clearance;Step to gait      OutComes Score  AM-PAC - Mobility    AM-PAC Basic Mobility - Inpatient   How much help is needed turning from your back to your side while in a flat bed without using bedrails?: None  How much help is needed moving from lying on your back to sitting on the side of a flat bed without using bedrails?: A Little  How much help is needed moving to and from a bed to a chair?: A Little  How much help is needed standing up from a chair using your arms?: A Little  How much help is needed walking in hospital room?: A Little  How much help is needed climbing 3-5 steps with a railing?: A Lot  AM-PAC Inpatient Mobility Raw Score : 18  AM-PAC Inpatient T-Scale Score : 43.63  Mobility Inpatient CMS 0-100% Score: 46.58  Mobility Inpatient CMS G-Code Modifier : CK    Goals  Short Term Goals  Time Frame for Short Term Goals: 1/11/23  Short Term Goal 1: pt will complete bed mobility independently  Short Term Goal 2: pt will complete sit<>stand supervision  Short Term Goal 3: pt will complete pivot transfer with supervision  Short Term Goal 4: pt will participate in 10-12 reps of BLE exercises by 1/07/23  Additional Goals?: No  Patient Goals   Patient Goals : \"to go home\"       Education  Patient Education  Education Given To: Patient  Education Provided: Role of Therapy;Plan of Care;Equipment;Precautions;Transfer Training;Orientation  Education Provided Comments: importance of mobility and therex, WB precautions  Education Method: Demonstration;Verbal  Barriers to Learning:

## 2024-01-04 NOTE — BRIEF OP NOTE
Brief Postoperative Note      Patient: Ant Carrasquillo  YOB: 1955  MRN: [id number]    Date of Procedure: 1/4/2024    Pre-Op Diagnosis Codes:     * Osteomyelitis of right foot, unspecified type (HCC) [M86.9]    Post-Op Diagnosis: Same       Procedure(s):  RIGHT HALLUX AMPUTATION    Surgeon(s):  Scout Camp DPM    Assistant:  Surgical Assistant: Dexter Kolb RN    Anesthesia: General    Estimated Blood Loss (mL): Minimal    Complications: None    Specimens:   ID Type Source Tests Collected by Time Destination   A : CLEARANCE MARGIN, RIGHT HALLUX Specimen Toe SURGICAL PATHOLOGY Scout Camp DPM 1/4/2024 0921        Implants:  * No implants in log *      Drains:   [REMOVED] Urinary Catheter 12/27/23 So-Temperature (Removed)   $ Urethral catheter insertion $ Not inserted for procedure 12/27/23 0615   Catheter Indications Urinary retention (acute or chronic), continuous bladder irrigation or bladder outlet obstruction 01/03/24 0845   Site Assessment No urethral drainage 01/03/24 0845   Urine Color Yellow 01/03/24 0845   Urine Appearance Clear 01/03/24 0845   Urine Odor Malodorous 01/02/24 0956   Collection Container Standard 01/03/24 0845   Securement Method Securing device (Describe) 01/03/24 0845   Catheter Care  Perineal wipes 01/03/24 0845   Catheter Best Practices  Drainage tube clipped to bed;Catheter secured to thigh;Tamper seal intact;Bag below bladder;Lack of dependent loop in tubing;Bag not on floor;Drainage bag less than half full 01/03/24 0845   Status Draining 01/03/24 0845   Output (mL) 750 mL 01/03/24 1553       Findings: Ulcer distal hallux stump. No deep space infection or infection noted. Bone from proximal margin of proximal phalanx sent as clearance margin. Amputation felt to be definitve. Vascular perfusion appeared adequate. Pustule noted on the proximal medial leg that was not noted previous days. Noted during prep today. Will need to jordon bedside, as he was

## 2024-01-04 NOTE — PLAN OF CARE
Problem: Safety - Adult  Goal: Free from fall injury  1/4/2024 1139 by Trinidad Mauricio RN  Outcome: Progressing     Problem: Chronic Conditions and Co-morbidities  Goal: Patient's chronic conditions and co-morbidity symptoms are monitored and maintained or improved  1/4/2024 1139 by Trinidad Mauricio RN  Outcome: Progressing     Problem: Pain  Goal: Verbalizes/displays adequate comfort level or baseline comfort level  Outcome: Progressing     Problem: Confusion  Goal: Confusion, delirium, dementia, or psychosis is improved or at baseline  Description: INTERVENTIONS:  1. Assess for possible contributors to thought disturbance, including medications, impaired vision or hearing, underlying metabolic abnormalities, dehydration, psychiatric diagnoses, and notify attending LIP  2. [place] high risk fall precautions, as indicated  3. Provide frequent short contacts to provide reality reorientation, refocusing and direction  4. Decrease environmental stimuli, including noise as appropriate  5. Monitor and intervene to maintain adequate nutrition, hydration, elimination, sleep and activity  6. If unable to ensure safety without constant attention obtain sitter and review sitter guidelines with assigned personnel  7. Initiate Psychosocial CNS and Spiritual Care consult, as indicated  Outcome: Progressing     Problem: Skin/Tissue Integrity  Goal: Absence of new skin breakdown  Description: 1.  Monitor for areas of redness and/or skin breakdown  2.  Assess vascular access sites hourly  3.  Every 4-6 hours minimum:  Change oxygen saturation probe site  4.  Every 4-6 hours:  If on nasal continuous positive airway pressure, respiratory therapy assess nares and determine need for appliance change or resting period.  1/4/2024 1139 by Trinidad Mauricio RN  Outcome: Progressing

## 2024-01-04 NOTE — FLOWSHEET NOTE
Patient return to room 206 in stable condition. A/O x4. Right foot ace wrapped per podiatry. Clear transparent dressing on blister on shin. Scant amount of drainage. Wife at bedside. Vitals stable.      01/04/24 1108   Vital Signs   Temp 97.5 °F (36.4 °C)   Temp Source Axillary   Pulse 77   Heart Rate Source Monitor   Respirations 18   BP (!) 147/95   MAP (Calculated) 112   BP Location Left upper arm   BP Method Automatic   Patient Position Semi fowlers   Pain Assessment   Pain Assessment None - Denies Pain   Oxygen Therapy   SpO2 98 %   O2 Device None (Room air)

## 2024-01-04 NOTE — INTERVAL H&P NOTE
Update History & Physical    The patient's History and Physical of December 27, 2023 was reviewed with the patient and I examined the patient. There was no change. The surgical site was confirmed by the patient and me.     Plan: The risks, benefits, expected outcome, and alternative to the recommended procedure have been discussed with the patient. Patient understands and wants to proceed with the procedure.     Electronically signed by Scout Camp DPM on 1/4/2024 at 8:37 AM

## 2024-01-04 NOTE — PROGRESS NOTES
Occupational Therapy  Facility/Department: [facility] A2 CARD TELEMETRY  Occupational Therapy Re-Assessment    Name: Ant Carrasquillo  : 1955  MRN: [id number]  Date of Service: 2024    Discharge Recommendations:  Subacute/Skilled Nursing Facility, 24 hour supervision or assist, Home with Home health OT, S Level 1  OT Equipment Recommendations  Equipment Needed: Yes  Mobility Devices: ADL Assistive Devices  ADL Assistive Devices: Transfer Tub Bench;Long-handled Sponge;Reacher;Sock-Aid Soft  Other: If pt d/c home     Therapy discharge recommendations take into account each patient's current medical complexities and are subject to input/oversight from the patient's healthcare team.     Barriers to Home Discharge:   [] Steps to access home entry or bed/bath:   [x] Unable to transfer, ambulate, or propel wheelchair household distances without assist   [x] Limited available assist at home upon discharge    [x] Patient or family requests d/c to post-acute facility    [] Poor cognition/safety awareness for d/c to home alone    [] Unable to maintain ordered weight bearing status    [] Patient with salient signs of long-standing immobility   [x] Decreased independence with ADLs   [x] Increased risk for falls   [] Other:    If pt is unable to be seen after this session, please let this note serve as discharge summary.  Please see case management note for discharge disposition.  Thank you.    Patient Diagnosis(es): The primary encounter diagnosis was Septic shock (HCC). Diagnoses of Osteomyelitis of right foot, unspecified type (HCC), PAF (paroxysmal atrial fibrillation) (HCC), and Other cardiomyopathy (HCC) were also pertinent to this visit.  Past Medical History:  has a past medical history of Diabetes mellitus (HCC), Hyperlipidemia, and Hypertension.  Past Surgical History:  has a past surgical history that includes Foot surgery (Right, 2017); Colonoscopy; eye surgery (Left); Appendectomy; Toe amputation (Right,  Inpatient CMS 0-100% Score: 53.32  ADL Inpatient CMS G-Code Modifier : CK    Goals  Short Term Goals  Time Frame for Short Term Goals: Short term goals to be met by 1/11 unless otherwise specified  Short Term Goal 1: Pt will perform toileting with SBA by 1/09  Short Term Goal 2: Pt will perform LB dressing with min A and LRAD  Short Term Goal 3: Pt will tolerate 5 min stance at RW to perform grooming tasks with SPV  Short Term Goal 4: Pt will tolerate x20 BUE ther ex  Patient Goals   Patient goals : \"to be get strong enough to go home and be independent\"       Therapy Time   Individual Concurrent Group Co-treatment   Time In 1542         Time Out 1618         Minutes 36         Timed Code Treatment Minutes: 26 Minutes (+10 min OT re-eval)       Janelle Price OT

## 2024-01-04 NOTE — PROGRESS NOTES
Consent for right foot hallux amputation signed by patient and placed in orr chart. Vitals stable upon transfer to OR. Transport en route.

## 2024-01-04 NOTE — ANESTHESIA PRE PROCEDURE
Department of Anesthesiology  Preprocedure Note       Name:  Ant Carrasquillo   Age:  68 y.o.  :  1955                                          MRN:  [id number]         Date:  2024      Surgeon: Surgeon(s):  Scout Camp DPM    Procedure: Procedure(s):  RIGHT HALLUX AMPUTATION    Medications prior to admission:   Prior to Admission medications    Medication Sig Start Date End Date Taking? Authorizing Provider   aspirin 325 MG tablet Take 1 tablet by mouth daily   Yes Héctor Navas MD   gabapentin (NEURONTIN) 800 MG tablet Take 1 tablet by mouth daily for 180 days.  Patient taking differently: Take 1 tablet by mouth 2 times daily. 23  Minnie Coelho MD   diclofenac (VOLTAREN) 75 MG EC tablet Take 1 tablet by mouth 2 times daily (with meals) 23   Minnie Coelho MD   glipiZIDE (GLUCOTROL) 10 MG tablet Take 1 tablet by mouth 2 times daily 23   Minnie Coelho MD   metFORMIN (GLUCOPHAGE) 500 MG tablet Take 2 tablets by mouth 2 times daily (with meals) 23   Minnie Coelho MD   enalapril (VASOTEC) 10 MG tablet Take 1 tablet by mouth 2 times daily 23   Minnie Coelho MD   rosuvastatin (CRESTOR) 10 MG tablet Take 1 tablet by mouth daily 23   Minnie Coelho MD   Cyanocobalamin (VITAMIN B 12 PO) Take by mouth    Héctor Navas MD       Current medications:    Current Facility-Administered Medications   Medication Dose Route Frequency Provider Last Rate Last Admin   • metoprolol succinate (TOPROL XL) extended release tablet 25 mg  25 mg Oral Daily Enzweiler, Diane M, APRN - CNP   25 mg at 24 0748   • piperacillin-tazobactam (ZOSYN) 3,375 mg in sodium chloride 0.9 % 50 mL IVPB (mini-bag)  3,375 mg IntraVENous Q8H Karma Grayson MD 12.5 mL/hr at 24 0751 3,375 mg at 24 0751   • fluticasone (FLONASE) 50 MCG/ACT nasal spray 1 spray  1 spray Each Nostril Daily Jose J Nava MD   1 spray at 24 1535   • oxyCODONE-acetaminophen (PERCOCET)

## 2024-01-04 NOTE — PROGRESS NOTES
Progress note    CC: chills and rigors  Summary:   Ant Carrasquillo is being seen by nephrology for Acute kidney injury (WOLF). He is a 68 y.o. male with a PMH significant for T2DM, hypertesnion, diabetic foot ulcer s/p right foot toe amputation who presented to the ED 12/26/2023 with shaking and nausea. He is noted to have ulcers on his right foot with concern for osteomyelitis.    Interval History  Seen at bedside  /95  On room air  Made 1275 mL urine yesterday without diuretics.  No labs today  Went to OR for foot debridement 12/29/2023 with podiatry.  Went back to the OR 1/4/2024 for right hallux amputation    Plan:   -to OR today for right hallux amputation  -Follow-up labs  -Good urine output        Brittany Cevallos MD  [facility]  Office: [phone]    Assessment:   WOLF:  - baseline Cr 0.9-1.2  - Cr on admission was 2.5  - associated with septic shock, hypotension  - UA with protein but otherwise bland  - WOLF 2/2 hemodynamic insult     Acute metabolic acidosis:  - 2/2 WOLF  - improved with medical management.     Septic shock:  - 2/2 diabetic right foot ulcer  - per primary     Hypertension:  - home regimen: enalapril 10 mg BID  - will hold.    NSTEMI:  - s/p stress test 1/2/2023, small apical and inferior reversible defect. Currently planning medical management.     Diabetic foot ulcer:  - per ID, podiatry.        PE:   Vitals:    01/04/24 1108   BP: (!) 147/95   Pulse: 77   Resp: 18   Temp: 97.5 °F (36.4 °C)   SpO2: 98%       General appearance: in NAD  Respiratory: Respiratory effort appears normal, bilateral equal chest rise, no wheeze, no crackles  Cardiovascular: Ausculation shows RRR no edema  Abdomen: No visible mass or tenderness, non distended.

## 2024-01-04 NOTE — ANESTHESIA POSTPROCEDURE EVALUATION
Department of Anesthesiology  Postprocedure Note    Patient: Ant Carrasquillo  MRN: [id number]  YOB: 1955  Date of evaluation: 1/4/2024    Procedure Summary       Date: 01/04/24 Room / Location: [address]    Anesthesia Start: 0850 Anesthesia Stop: 0945    Procedure: RIGHT HALLUX AMPUTATION (Right: Foot) Diagnosis:       Osteomyelitis of right foot, unspecified type (HCC)      (Osteomyelitis of right foot, unspecified type (HCC) [M86.9])    Surgeons: Scout Camp DPM Responsible Provider: Chuy Nation MD    Anesthesia Type: general ASA Status: 3            Anesthesia Type: No value filed.    Burke Phase I: Burke Score: 9    Burke Phase II:      Anesthesia Post Evaluation    Comments: Anes Post-op Note    Name:    Ant Carrasquillo  MRN:      [id number]    Patient Vitals in the past 12 hrs:  01/04/24 1108, BP:(!) 147/95, Temp:97.5 °F (36.4 °C), Temp src:Axillary, Pulse:77, Resp:18, SpO2:98 %  01/04/24 1050, Pulse:67, Resp:16, SpO2:93 %  01/04/24 1045, BP:(!) 142/78, Pulse:70, Resp:17, SpO2:93 %  01/04/24 1040, BP:(!) 141/76, Pulse:69, Resp:16, SpO2:94 %  01/04/24 1035, Pulse:68, Resp:18, SpO2:92 %  01/04/24 1030, BP:138/75, Pulse:70, Resp:19, SpO2:92 %  01/04/24 1025, BP:(!) 142/74, Pulse:67, Resp:18, SpO2:93 %  01/04/24 1010, BP:(!) 141/76, Pulse:71, Resp:19, SpO2:96 %  01/04/24 1005, BP:128/71, Pulse:69, Resp:12, SpO2:99 %  01/04/24 1000, BP:127/72, Pulse:70, Resp:16, SpO2:97 %  01/04/24 0955, BP:129/71, Pulse:73, Resp:19, SpO2:97 %  01/04/24 0945, BP:123/68, Temp:96.9 °F (36.1 °C), Temp src:Temporal, Pulse:76, Resp:16, SpO2:99 %  01/04/24 0756, BP:(!) 150/87, Temp:97.8 °F (36.6 °C), Temp src:Temporal, Pulse:70, Resp:18, SpO2:98 %  01/04/24 0734, BP:(!) 145/93, Temp:97.9 °F (36.6 °C), Temp src:Oral, Pulse:73, Resp:18, SpO2:99 %  01/04/24 0430, Weight:91 kg (200 lb 9.6 oz)  01/04/24 0415, BP:(!) 143/85, Temp:98.4 °F (36.9 °C), Temp src:Oral, Pulse:67,

## 2024-01-04 NOTE — PLAN OF CARE
Problem: Discharge Planning  Goal: Discharge to home or other facility with appropriate resources  Outcome: Progressing     Problem: Safety - Adult  Goal: Free from fall injury  Outcome: Progressing    Pt high fall risk. Instructed to use call light before getting out of bed. Call light within reach. Bed in low position. Bed alarm on.  Will continue to monitor.       Problem: Chronic Conditions and Co-morbidities  Goal: Patient's chronic conditions and co-morbidity symptoms are monitored and maintained or improved  Outcome: Progressing   Pt will have accuchecks before meals and at bedtime with sliding scale insulin in place for coverage. Will continue to monitor for signs and symptoms of hypoglycemia and hyperglycemia throughout shift.

## 2024-01-04 NOTE — PROGRESS NOTES
TISSUES: Shallow soft tissue ulceration plantar to the medial cuneiform with mild underlying subcutaneous edema.  Shallow soft tissue ulceration also seen plantar to the distal aspect of the 1st proximal phalanx with underlying subcutaneous edema.  No drainable fluid collection identified.  No abnormal soft tissue mass.  Severe fatty atrophy of the forefoot musculature. TENDONS: No tenosynovitis.     1. Shallow soft tissue ulcerations plantar to the medial cuneiform and distal aspect of the 1st proximal phalanx with underlying subcutaneous edema compatible with cellulitis.  No drainable fluid collection identified. 2. Mild marrow edema in the distal aspect of the 1st proximal phalanx compatible with noninfectious reactive osteitis versus early osteomyelitis. 3. Significant marrow edema throughout the medial cuneiform partially obscured by susceptibility artifact. Differential diagnosis includes osteomyelitis versus edema related to postoperative and degenerative changes. 4. Status post 1st tarsometatarsal arthrodesis. Severe degenerative changes of the joint without significant osseous fusion identified.       CBC:   Recent Labs     01/02/24  0421 01/03/24  0418   WBC 9.2 9.7   HGB 8.6* 9.0*    320       BMP:    Recent Labs     01/02/24  0421 01/03/24  0418   * 135*   K 3.8 3.7    101   CO2 23 23   BUN 31* 27*   CREATININE 1.9* 1.6*   GLUCOSE 166* 141*       Hepatic: No results for input(s): \"AST\", \"ALT\", \"ALB\", \"BILITOT\", \"ALKPHOS\" in the last 72 hours.  Lipids:   Lab Results   Component Value Date/Time    CHOL 103 04/03/2023 12:00 AM    HDL 34 04/03/2023 12:00 AM    TRIG 107 04/03/2023 12:00 AM     Hemoglobin A1C:   Lab Results   Component Value Date/Time    LABA1C 7.8 12/28/2023 03:53 AM     TSH: No results found for: \"TSH\"  Troponin: No results found for: \"TROPONINT\"  Lactic Acid: No results for input(s): \"LACTA\" in the last 72 hours.  BNP: No results for input(s): \"PROBNP\" in the last 72

## 2024-01-05 ENCOUNTER — APPOINTMENT (OUTPATIENT)
Dept: GENERAL RADIOLOGY | Age: 69
End: 2024-01-05
Payer: MEDICARE

## 2024-01-05 LAB
ALBUMIN SERPL-MCNC: 2.9 G/DL (ref 3.4–5)
ALBUMIN/GLOB SERPL: 0.9 {RATIO} (ref 1.1–2.2)
ALP SERPL-CCNC: 117 U/L (ref 40–129)
ALT SERPL-CCNC: 16 U/L (ref 10–40)
ANION GAP SERPL CALCULATED.3IONS-SCNC: 11 MMOL/L (ref 3–16)
AST SERPL-CCNC: 13 U/L (ref 15–37)
BASOPHILS # BLD: 0 K/UL (ref 0–0.2)
BASOPHILS NFR BLD: 0.4 %
BILIRUB SERPL-MCNC: 0.4 MG/DL (ref 0–1)
BUN SERPL-MCNC: 25 MG/DL (ref 7–20)
CALCIUM SERPL-MCNC: 8.3 MG/DL (ref 8.3–10.6)
CHLORIDE SERPL-SCNC: 102 MMOL/L (ref 99–110)
CO2 SERPL-SCNC: 25 MMOL/L (ref 21–32)
CREAT SERPL-MCNC: 1.5 MG/DL (ref 0.8–1.3)
CREAT UR-MCNC: 79.5 MG/DL (ref 39–259)
DEPRECATED RDW RBC AUTO: 12.9 % (ref 12.4–15.4)
EOSINOPHIL # BLD: 0.1 K/UL (ref 0–0.6)
EOSINOPHIL NFR BLD: 1.1 %
GFR SERPLBLD CREATININE-BSD FMLA CKD-EPI: 50 ML/MIN/{1.73_M2}
GLUCOSE BLD-MCNC: 134 MG/DL (ref 70–99)
GLUCOSE BLD-MCNC: 172 MG/DL (ref 70–99)
GLUCOSE BLD-MCNC: 183 MG/DL (ref 70–99)
GLUCOSE BLD-MCNC: 223 MG/DL (ref 70–99)
GLUCOSE SERPL-MCNC: 183 MG/DL (ref 70–99)
HCT VFR BLD AUTO: 23.3 % (ref 40.5–52.5)
HGB BLD-MCNC: 7.9 G/DL (ref 13.5–17.5)
LYMPHOCYTES # BLD: 2.1 K/UL (ref 1–5.1)
LYMPHOCYTES NFR BLD: 22.4 %
MCH RBC QN AUTO: 29.7 PG (ref 26–34)
MCHC RBC AUTO-ENTMCNC: 34.1 G/DL (ref 31–36)
MCV RBC AUTO: 87.3 FL (ref 80–100)
MONOCYTES # BLD: 0.6 K/UL (ref 0–1.3)
MONOCYTES NFR BLD: 6.8 %
NEUTROPHILS # BLD: 6.6 K/UL (ref 1.7–7.7)
NEUTROPHILS NFR BLD: 69.3 %
PERFORMED ON: ABNORMAL
PLATELET # BLD AUTO: 316 K/UL (ref 135–450)
PMV BLD AUTO: 7.7 FL (ref 5–10.5)
POTASSIUM SERPL-SCNC: 3.6 MMOL/L (ref 3.5–5.1)
PROT SERPL-MCNC: 6.2 G/DL (ref 6.4–8.2)
PROT UR-MCNC: 65 MG/DL
PROT/CREAT UR-RTO: 0.8 MG/DL
RBC # BLD AUTO: 2.67 M/UL (ref 4.2–5.9)
SODIUM SERPL-SCNC: 138 MMOL/L (ref 136–145)
WBC # BLD AUTO: 9.5 K/UL (ref 4–11)

## 2024-01-05 PROCEDURE — 97116 GAIT TRAINING THERAPY: CPT

## 2024-01-05 PROCEDURE — 97530 THERAPEUTIC ACTIVITIES: CPT

## 2024-01-05 PROCEDURE — 84156 ASSAY OF PROTEIN URINE: CPT

## 2024-01-05 PROCEDURE — 97535 SELF CARE MNGMENT TRAINING: CPT

## 2024-01-05 PROCEDURE — 2580000003 HC RX 258: Performed by: PODIATRIST

## 2024-01-05 PROCEDURE — 1200000000 HC SEMI PRIVATE

## 2024-01-05 PROCEDURE — 6370000000 HC RX 637 (ALT 250 FOR IP): Performed by: PODIATRIST

## 2024-01-05 PROCEDURE — 6360000002 HC RX W HCPCS: Performed by: PODIATRIST

## 2024-01-05 PROCEDURE — 97110 THERAPEUTIC EXERCISES: CPT

## 2024-01-05 PROCEDURE — 82043 UR ALBUMIN QUANTITATIVE: CPT

## 2024-01-05 PROCEDURE — 99232 SBSQ HOSP IP/OBS MODERATE 35: CPT | Performed by: INTERNAL MEDICINE

## 2024-01-05 PROCEDURE — 80053 COMPREHEN METABOLIC PANEL: CPT

## 2024-01-05 PROCEDURE — 85025 COMPLETE CBC W/AUTO DIFF WBC: CPT

## 2024-01-05 PROCEDURE — 82570 ASSAY OF URINE CREATININE: CPT

## 2024-01-05 RX ADMIN — Medication 10 ML: at 19:53

## 2024-01-05 RX ADMIN — INSULIN LISPRO 2 UNITS: 100 INJECTION, SOLUTION INTRAVENOUS; SUBCUTANEOUS at 13:06

## 2024-01-05 RX ADMIN — ENOXAPARIN SODIUM 40 MG: 100 INJECTION SUBCUTANEOUS at 09:45

## 2024-01-05 RX ADMIN — PIPERACILLIN AND TAZOBACTAM 3375 MG: 3; .375 INJECTION, POWDER, FOR SOLUTION INTRAVENOUS at 15:32

## 2024-01-05 RX ADMIN — PIPERACILLIN AND TAZOBACTAM 3375 MG: 3; .375 INJECTION, POWDER, FOR SOLUTION INTRAVENOUS at 09:52

## 2024-01-05 RX ADMIN — INSULIN LISPRO 4 UNITS: 100 INJECTION, SOLUTION INTRAVENOUS; SUBCUTANEOUS at 13:06

## 2024-01-05 RX ADMIN — ROSUVASTATIN 10 MG: 10 TABLET, FILM COATED ORAL at 09:46

## 2024-01-05 RX ADMIN — FERROUS SULFATE TAB 325 MG (65 MG ELEMENTAL FE) 325 MG: 325 (65 FE) TAB at 09:46

## 2024-01-05 RX ADMIN — ASPIRIN 81 MG: 81 TABLET, COATED ORAL at 09:46

## 2024-01-05 RX ADMIN — INSULIN LISPRO 4 UNITS: 100 INJECTION, SOLUTION INTRAVENOUS; SUBCUTANEOUS at 09:46

## 2024-01-05 RX ADMIN — PIPERACILLIN AND TAZOBACTAM 3375 MG: 3; .375 INJECTION, POWDER, FOR SOLUTION INTRAVENOUS at 01:51

## 2024-01-05 RX ADMIN — INSULIN GLARGINE 13 UNITS: 100 INJECTION, SOLUTION SUBCUTANEOUS at 19:53

## 2024-01-05 RX ADMIN — FLUTICASONE PROPIONATE 1 SPRAY: 50 SPRAY, METERED NASAL at 09:53

## 2024-01-05 RX ADMIN — GABAPENTIN 300 MG: 300 CAPSULE ORAL at 09:46

## 2024-01-05 RX ADMIN — Medication 10 ML: at 09:53

## 2024-01-05 RX ADMIN — GABAPENTIN 300 MG: 300 CAPSULE ORAL at 15:27

## 2024-01-05 RX ADMIN — INSULIN LISPRO 4 UNITS: 100 INJECTION, SOLUTION INTRAVENOUS; SUBCUTANEOUS at 18:15

## 2024-01-05 RX ADMIN — FOLIC ACID 1 MG: 1 TABLET ORAL at 09:46

## 2024-01-05 RX ADMIN — SODIUM CHLORIDE: 9 INJECTION, SOLUTION INTRAVENOUS at 01:50

## 2024-01-05 RX ADMIN — METOPROLOL SUCCINATE 25 MG: 25 TABLET, EXTENDED RELEASE ORAL at 09:46

## 2024-01-05 RX ADMIN — GABAPENTIN 300 MG: 300 CAPSULE ORAL at 19:53

## 2024-01-05 ASSESSMENT — PAIN SCALES - GENERAL: PAINLEVEL_OUTOF10: 0

## 2024-01-05 NOTE — PROGRESS NOTES
Comprehensive Nutrition Assessment    Type and Reason for Visit:  Initial, RD Nutrition Re-Screen/LOS    Nutrition Recommendations/Plan:   Continue carb control diet   Monitor nutrition adequacy, pertinent labs, bowel habits, wt changes, and clinical progress     Malnutrition Assessment:  Malnutrition Status:  At risk for malnutrition (Comment) (01/05/24 1215)      Nutrition Assessment:    67 yo male admitted with septic shock. Hx of DM and HTN. S/p I&D of foot on 12/29 with podiatry. S/p R hallux amputation on 1/4. Pt nutritionally at risk AEB variable PO intakes this admission. Pt reports appetite good. States family often bringing in food for pt. Encouraged PO and protein intakes for wound healing. Declined ONS. Declined diet education needs, reports can find information on [employer]. BG elevated this admission and A1c of 7.8%. Will continue to monitor.    Nutrition Related Findings:    Active BS. BM on 1/2. +2 RLE and +1 LLE edema. Labs reviewed. -355 past 24 hrs. A1c of 7.8% on 12/28. Wound Type: Diabetic Ulcer, Surgical Incision       Current Nutrition Intake & Therapies:    Average Meal Intake: 0%, %  Average Supplements Intake: None Ordered  ADULT DIET; Regular; 4 carb choices (60 gm/meal)    Anthropometric Measures:  Height: 177.8 cm (5' 10\")  Ideal Body Weight (IBW): 166 lbs (75 kg)    Admission Body Weight: 92.1 kg (203 lb) (bed scale)  Current Body Weight: 89.8 kg (198 lb), 119.3 % IBW. Weight Source: Bed Scale  Current BMI (kg/m2): 28.4                          BMI Categories: Overweight (BMI 25.0-29.9)    Estimated Daily Nutrient Needs:  Energy Requirements Based On: Kcal/kg (25-30)  Weight Used for Energy Requirements: Ideal  Energy (kcal/day): 5985-4549 kcal  Weight Used for Protein Requirements: Ideal (1.0-1.2 g/kg)  Protein (g/day): 75-90 g  Method Used for Fluid Requirements: 1 ml/kcal  Fluid (ml/day): 0912-3486 mL    Nutrition Diagnosis:   Increased nutrient needs related to increase  demand for energy/nutrients as evidenced by wounds (extended hospital stay)    Nutrition Interventions:   Food and/or Nutrient Delivery: Continue Current Diet  Nutrition Education/Counseling: Education declined  Coordination of Nutrition Care: Continue to monitor while inpatient       Goals:     Goals: PO intake 50% or greater, prior to discharge       Nutrition Monitoring and Evaluation:   Behavioral-Environmental Outcomes: None Identified  Food/Nutrient Intake Outcomes: Food and Nutrient Intake  Physical Signs/Symptoms Outcomes: Biochemical Data, Weight, Nutrition Focused Physical Findings, Skin    Discharge Planning:    Continue current diet     Deedee Judge, MS, RD, LD  Contact: [phone]

## 2024-01-05 NOTE — PROGRESS NOTES
Physical Therapy  Facility/Department: [facility] A2 CARD TELEMETRY  Daily Treatment Note  NAME: Ant Carrasquillo  : 1955  MRN: [id number]    Date of Service: 2024    Discharge Recommendations:  24 hour supervision or assist   PT Equipment Recommendations  Equipment Needed: Yes (tub transfer bench and toilet seat with handles, has RW)    Patient Diagnosis(es): The primary encounter diagnosis was Septic shock (HCC). Diagnoses of Osteomyelitis of right foot, unspecified type (HCC), PAF (paroxysmal atrial fibrillation) (HCC), and Other cardiomyopathy (HCC) were also pertinent to this visit.    Assessment   Assessment: Pt progressing with PT needing grossly Supervison/mod I for bed mobility, transfers and gait with RW up to 60'.  Pt is recommended for con't skilled PT and home with 24 hr supervision A at D/C.  Activity Tolerance: Patient tolerated treatment well;Patient limited by endurance;Patient tolerated evaluation without incident  Equipment Needed: Yes (tub transfer bench and toilet seat with handles, has RW)     Plan    Physical Therapy Plan  General Plan: 3-5 times per week  Current Treatment Recommendations: Strengthening;ROM;Balance training;Functional mobility training;Gait training;Home exercise program;Therapeutic activities;Transfer training;Safety education & training;Patient/Caregiver education & training;Endurance training;Pain management;Co-Treatment     Restrictions  Restrictions/Precautions  Restrictions/Precautions: Weight Bearing, Fall Risk, General Precautions, Seizure  Lower Extremity Weight Bearing Restrictions  Right Lower Extremity Weight Bearing: Partial Weight Bearing  Partial Weight Bearing Percentage Or Pounds: Non weight bearing to Right leg.  Position Activity Restriction  Other position/activity restrictions: IV, telemetry, \"Partial Weight Bearing of right foot on heel only. Must be in surgical shoe.\"     Subjective    Subjective  Subjective: pt found in bed, agreeable to  therapy  Pain: \"sensations are returning\"  Orientation  Overall Orientation Status: Within Normal Limits  Cognition  Overall Cognitive Status: WFL     Objective   Vitals  Vitals:    01/05/24    BP: 138/78   Pulse: 65   Resp:    Temp:    SpO2: 99%          Bed Mobility Training  Bed Mobility Training: Yes  Supine to Sit: Modified independent  Sit to Supine: Modified independent  Balance  Sitting: Intact  Standing: With support  Transfer Training  Transfer Training: Yes  Interventions: Verbal cues (for HWB; able to maintain once cued)  Sit to Stand: Supervision;Adaptive equipment (RW)  Stand to Sit: Supervision;Adaptive equipment (RW)  Gait Training  Gait Training: Yes  Right Side Weight Bearing: Partial (%) (HWB with surgical shoe)  Gait  Overall Level of Assistance: Supervision  Distance (ft): 60 Feet  Assistive Device: Gait belt;Walker, rolling  Interventions: Verbal cues  Stance: Left decreased;Right increased     PT Exercises  Exercise Treatment: BLE seated ther ex: LAQ, marches, hip abd/add; AP (LLE only) x 30 -40 each     Safety Devices  Type of Devices: Left in chair;Chair alarm in place;Call light within reach;Nurse notified;Gait belt;Patient at risk for falls  Restraints  Restraints Initially in Place: No       Goals  Short Term Goals  Time Frame for Short Term Goals: 1/11/23  Short Term Goal 1: pt will complete bed mobility independently  Short Term Goal 2: pt will complete sit<>stand supervision  Short Term Goal 3: pt will complete pivot transfer with supervision  Short Term Goal 4: pt will participate in 10-12 reps of BLE exercises by 1/07/23  Additional Goals?: No  Patient Goals   Patient Goals : \"to go home\"    Education  Patient Education  Education Given To: Patient  Education Provided: Role of Therapy;Plan of Care;Equipment;Precautions;Transfer Training;Orientation  Education Provided Comments: importance of mobility and therex, WB precautions  Education Method: Demonstration;Verbal  Barriers to

## 2024-01-05 NOTE — PROGRESS NOTES
Infectious Disease Follow up Notes    CC :  DFI      Antibiotics:  Zosyn 3.375 q8    Admit Date:   12/26/2023  Hospital Day: 11    Subjective:   He remains AF on RA   Doing well.  Pain is controlled.  Voiding without difficulty.  Eager for DC     Objective:     Patient Vitals for the past 8 hrs:   BP Temp Temp src Pulse Resp SpO2 Height Weight   01/05/24 1210 -- -- -- -- -- -- 1.778 m (5' 10\") --   01/05/24 0848 138/78 98.1 °F (36.7 °C) Oral 65 18 99 % -- --   01/05/24 0515 -- -- -- -- -- -- -- 90 kg (198 lb 6.6 oz)   01/05/24 0500 135/81 97.5 °F (36.4 °C) Oral 67 18 98 % -- --         EXAM:  General:   alert, NAD    HEENT:   NCAT, PERRL, sclera anicteric  NECK:   supple  LUNGS:    non-labored breathing      ABD: Soft, flat, NT   EXT: Focal erythema lower R leg with tenderness and now small drainage abscess.  Intensity of local erythema has diminished   Bulky dressing R foot   SKIN: No focal rash         LINE:   PIV in place         Scheduled Meds:   metoprolol succinate  25 mg Oral Daily    piperacillin-tazobactam  3,375 mg IntraVENous Q8H    fluticasone  1 spray Each Nostril Daily    folic acid  1 mg Oral Daily    ferrous sulfate  325 mg Oral Daily with breakfast    aspirin EC  81 mg Oral Daily    gabapentin  300 mg Oral TID    rosuvastatin  10 mg Oral Daily    sodium chloride flush  5-40 mL IntraVENous 2 times per day    enoxaparin  40 mg SubCUTAneous Daily    insulin glargine  0.15 Units/kg SubCUTAneous Nightly    insulin lispro  0.05 Units/kg SubCUTAneous TID WC    insulin lispro  0-8 Units SubCUTAneous TID WC    insulin lispro  0-4 Units SubCUTAneous Nightly       Continuous Infusions:   sodium chloride 5 mL/hr at 01/05/24 0150    dextrose            Data Review:    Lab Results   Component Value Date    WBC 9.5 01/05/2024    HGB 7.9 (L) 01/05/2024    HCT 23.3 (L) 01/05/2024    MCV 87.3 01/05/2024     01/05/2024     Lab  01/02/2024    Osteomyelitis of right foot (HCC) 01/02/2024    Overweight (BMI 25.0-29.9) 12/28/2023    Hyponatremia 12/28/2023    Acute kidney injury superimposed on CKD (HCC) 12/28/2023    Septic shock ([facility]) 12/27/2023    Ulcer of right foot, limited to breakdown of skin ([facility]) 12/27/2023    Type 2 diabetes mellitus with diabetic polyneuropathy ([facility]) 05/01/2023    Essential hypertension 05/01/2023    Neuropathy 05/01/2023    Hyperlipidemia associated with type 2 diabetes mellitus (HCC) 05/01/2023       Background of DM, HTN, HLD    Admitted 12/27/23 with sepsis, presumed from cellulitis RLE associated with neuropathic plantar ulcer     R hallux OM  MRI was without definite OM yet OR 12/29/23 - I&D and bone biopsy - soft bone medial cuneiform and hallux on right foot noted in op report - path of hallux was consistent with OM while surgical culture from that site was negative.   Wound culture with GpBS and CoNS.    S/p hallux amp 1/4/23, though to be definitive     Cellulitic appearing lower RLE suspect due to thrombophelbitis     Bacteremia, positive BC on admission with Pasturella, Fusobacterium and Gemella sp  He has 2 dogs.  No recent bites, scratches     Leukocytosis has resolved     WOLF - resolving    NKDA      Plan:     Podiatry to follow-up re possible bedside I&D of leg abscess    He has had 10d IV abx for all the above   Toe amp thought to be definitive for management of OM hallux    Ok to transition to Augmentin 875 BID for another 7d at [place]         Discussed with patient/family, all questions answered  D/w Hospitalist       Karma Grayson MD  Phone: [phone]   Fax : [phone]

## 2024-01-05 NOTE — CARE COORDINATION
Therapy working with patient.  They are recommending home with home care as opposed to SNF as he is physically stable for home.   Discussed with patient and wife and they are very much in agreement with this plan as they would like to get patient home.   Original plan was for patient to discharge to [facility] but patient assures that he would be better off at home.  He has a walker at home that he will be able to use.  As for other DME recommended in OT notes, Jason with [facility] states that Medicare will not cover that.  Patient and wife notified.  He is agreeable with home care and a referral to [facility].  Placed call to Berenice with [facility] and notified of referral.   She states they are able to follow at discharge for home care needs including RN/PT/OT.  Spoke with Dr. Grayson who is unsure at this time if patient will discharge with IV antibiotics.  If so, patient and wife agreeable with a  referral to Tom with [facility].  Will follow.

## 2024-01-05 NOTE — PROGRESS NOTES
Podiatry Progress Note      Subjective:              The patient is a 68 y.o. male who is s/p right foot hallux amputation. Resting comfortably. No pain in foot. No acute overnight events. Abscess on on anterior right leg was noted yesterday during surgery.     Past Medical History:        Diagnosis Date    Diabetes mellitus (HCC)     Hyperlipidemia     Hypertension        Past Surgical History:        Procedure Laterality Date    APPENDECTOMY      COLONOSCOPY      EYE SURGERY Left     lasik    FOOT DEBRIDEMENT Right 12/29/2023    FOOT DEBRIDEMENT TO BONE WITH BONE BIOPSY  INCISION AND DRAINAGE RIGHT FOOT performed by Scout Camp DPM at [facility] OR    FOOT SURGERY Right 2017    big toe    TOE AMPUTATION Right 3/3/2020    AMPUTATION OF RIGHT GREAT TOE performed by Candi Nixon DPM at [facility] OR    TOE AMPUTATION Right 1/4/2024    RIGHT HALLUX AMPUTATION performed by Scout Camp DPM at [facility] OR       Current Medications:    Current Facility-Administered Medications: metoprolol succinate (TOPROL XL) extended release tablet 25 mg, 25 mg, Oral, Daily  piperacillin-tazobactam (ZOSYN) 3,375 mg in sodium chloride 0.9 % 50 mL IVPB (mini-bag), 3,375 mg, IntraVENous, Q8H  fluticasone (FLONASE) 50 MCG/ACT nasal spray 1 spray, 1 spray, Each Nostril, Daily  oxyCODONE-acetaminophen (PERCOCET) 5-325 MG per tablet 1 tablet, 1 tablet, Oral, Q4H PRN  folic acid (FOLVITE) tablet 1 mg, 1 mg, Oral, Daily  ferrous sulfate (IRON 325) tablet 325 mg, 325 mg, Oral, Daily with breakfast  aspirin EC tablet 81 mg, 81 mg, Oral, Daily  gabapentin (NEURONTIN) capsule 300 mg, 300 mg, Oral, TID  rosuvastatin (CRESTOR) tablet 10 mg, 10 mg, Oral, Daily  sodium chloride flush 0.9 % injection 5-40 mL, 5-40 mL, IntraVENous, 2 times per day  sodium chloride flush 0.9 % injection 5-40 mL, 5-40 mL, IntraVENous, PRN  0.9 % sodium chloride infusion, , IntraVENous, PRN  enoxaparin (LOVENOX) injection 40 mg, 40 mg, SubCUTAneous,

## 2024-01-05 NOTE — PROGRESS NOTES
Progress note    CC: chills and rigors  Summary:   Ant Carrasquillo is being seen by nephrology for Acute kidney injury (WOLF). He is a 68 y.o. male with a PMH significant for T2DM, hypertesnion, diabetic foot ulcer s/p right foot toe amputation who presented to the ED 12/26/2023 with shaking and nausea. He is noted to have ulcers on his right foot with concern for osteomyelitis.    Interval History  Seen at bedside  /81  On room air  Made 900 mL urine + 1 unmeasured occurrences yesterday without diuretics.  Cr down to 1.5, might be his new baseline.  Went to OR for foot debridement 12/29/2023 with podiatry.  Went back to the OR 1/4/2024 for right hallux amputation    Plan:   -BP at goal  -check UPCR and UACR  -WOLF slowly improving. Likely at new baseline at this point.   -continue current medications.  - ok for discharge from nephrology perspective.  - ok to resume home enalapril at discharge.      Brittany Cevallos MD  [facility]  Office: [phone]    Assessment:   WOLF:  - baseline Cr 0.9-1.2  - Cr on admission was 2.5  - associated with septic shock, hypotension  - UA with protein but otherwise bland  - WOLF 2/2 hemodynamic insult     Acute metabolic acidosis:  - 2/2 WOLF  - improved with medical management.     Septic shock:  - 2/2 diabetic right foot ulcer  - per primary     Hypertension:  - home regimen: enalapril 10 mg BID  - will hold.    NSTEMI:  - s/p stress test 1/2/2023, small apical and inferior reversible defect. Currently planning medical management.     Diabetic foot ulcer:  - per ID, podiatry.        PE:   Vitals:    01/05/24 0500   BP: 135/81   Pulse: 67   Resp: 18   Temp: 97.5 °F (36.4 °C)   SpO2: 98%       General appearance: in NAD  Respiratory: Respiratory effort appears normal, bilateral equal chest rise, no wheeze, no crackles  Cardiovascular: Ausculation shows RRR no edema  Abdomen: No visible mass or tenderness, non

## 2024-01-05 NOTE — CARE COORDINATION
Critical access hospital    Referral received from  to follow for home care services.   I will follow for needs, and speak with patient to verify demos.    Berenice Rivera RN, BSN [phone]  [facility]   [phone] fax [phone]  [facility] [phone]  [facility] [phone]

## 2024-01-05 NOTE — PROGRESS NOTES
risk for falls  Restraints  Restraints Initially in Place: No     Patient Education  Education Given To: Patient;Family  Education Provided: Role of Therapy;Plan of Care;Precautions;ADL Adaptive Strategies;Transfer Training;Equipment;Fall Prevention Strategies;Family Education  Education Provided Comments: home safety concerns, OT discharge  recommendations, toilet transfers, DME recommendation for tub transfer bench/RW/toilet safety frame vs BSC, standing balance, heel weightbearing R foot during transfers/ambulation with FWW, adaptive LB dressing strategies, safety with bathing, tub/shower transfers with use of tub transfer bench, importance of elevating RLE when at rest  Education Method: Verbal;Demonstration  Barriers to Learning: None  Education Outcome: Verbalized understanding;Continued education needed;Demonstrated understanding    Goals  Short Term Goals  Time Frame for Short Term Goals: Short term goals to be met by 1/11 unless otherwise specified  Short Term Goal 1: Pt will perform toileting with SBA by 1/09; GOAL MET 1/5, UPGRADE TO MOD I  Short Term Goal 2: Pt will perform LB dressing with min A and LRAD; GOAL MET 1/5, UPGRADE TO MOD I  Short Term Goal 3: Pt will tolerate 5 min stance at RW to perform grooming tasks with SPV; ongoing  Short Term Goal 4: Pt will tolerate x20 BUE ther ex; ongoing  Patient Goals   Patient goals : \"to be get strong enough to go home and be independent\"    AM-PAC - ADL  AM-PAC Daily Activity - Inpatient   How much help is needed for putting on and taking off regular lower body clothing?: A Little  How much help is needed for bathing (which includes washing, rinsing, drying)?: A Little  How much help is needed for toileting (which includes using toilet, bedpan, or urinal)?: A Little  How much help is needed for putting on and taking off regular upper body clothing?: A Little  How much help is needed for taking care of personal grooming?: A Little  How much help for eating  meals?: None  AM-PAC Inpatient Daily Activity Raw Score: 19  AM-PAC Inpatient ADL T-Scale Score : 40.22  ADL Inpatient CMS 0-100% Score: 42.8  ADL Inpatient CMS G-Code Modifier : CK    Therapy Time   Individual Concurrent Group Co-treatment   Time In 1120         Time Out 1154         Minutes 34         Timed Code Treatment Minutes: 34 Minutes     If pt is discharged prior to next OT session, this note will serve as the discharge summary.    Enid Llanos, OT

## 2024-01-05 NOTE — PROGRESS NOTES
V2.0    [facility] [place] [facility] Progress Note      Name:  Ant Carrasquillo /Age/Sex: 1955  (68 y.o. male)   MRN & CSN:  [id number] & [id number] Encounter Date/Time: 2024 12:56 PM EST   Location:   PCP: Minnie Coelho MD     Attending:Mai Rutledge MD       Hospital Day: 11    Assessment and Recommendations   Ant Carrasquillo is a 68 y.o. male who presents with Septic shock ([facility])      Plan:     Septic shock secondary to osteomyelitis with diabetic foot ulcer.  -Septic shock resolved  -Podiatry and infectious disease were consulted.  Underwent OR with debridement of right foot to the bone with bone biopsy on . Went to OR again for right hallux amputation on . Surgical cultures pending.   -Continue Zosyn-discussed with ID, can switch to p.o. Augmentin upon discharge.    Proximal medial leg pustule  -Podiatry planning to jordon at bedside    Troponin elevation  -Likely in the setting of septic shock and renal failure.  Resolved now.  -He did have a abnormal stress test.  Cardiology evaluated patient and plan for medical management for now.    Acute systolic heart failure  -Noted on echocardiogram during this admission.  Stress test done during his admission reviewed by cardiology and no plan for ischemic eval outpatient as per their service.    WOLF  -Likely secondary to septic shock.  Improved with IV hydration and vasopressor support initially.    -Creatinine improving.    Anemia  -Likely in the setting of critical illness.  No signs of bleeding but will monitor.    Hypertension  -Home meds on hold due to septic shock initially.  Will restart as blood pressure tolerates.    Type 2 diabetes mellitus  -Basal bolus regimen with hypoglycemia protocol ordered.    Superficial venous thrombosis  of greater saphenous vein in the distal calf.   -Discussed with heme. No intervention or full dose AC as per their service.. They recommended DVT ppx dose due to immobilization      Diet ADULT DIET;  Mild 1st metatarsophalangeal degenerative changes.  The lesser MTP joints are unremarkable. SOFT TISSUES: Shallow soft tissue ulceration plantar to the medial cuneiform with mild underlying subcutaneous edema.  Shallow soft tissue ulceration also seen plantar to the distal aspect of the 1st proximal phalanx with underlying subcutaneous edema.  No drainable fluid collection identified.  No abnormal soft tissue mass.  Severe fatty atrophy of the forefoot musculature. TENDONS: No tenosynovitis.     1. Shallow soft tissue ulcerations plantar to the medial cuneiform and distal aspect of the 1st proximal phalanx with underlying subcutaneous edema compatible with cellulitis.  No drainable fluid collection identified. 2. Mild marrow edema in the distal aspect of the 1st proximal phalanx compatible with noninfectious reactive osteitis versus early osteomyelitis. 3. Significant marrow edema throughout the medial cuneiform partially obscured by susceptibility artifact. Differential diagnosis includes osteomyelitis versus edema related to postoperative and degenerative changes. 4. Status post 1st tarsometatarsal arthrodesis. Severe degenerative changes of the joint without significant osseous fusion identified.       CBC:   Recent Labs     01/03/24  0418 01/05/24  0506   WBC 9.7 9.5   HGB 9.0* 7.9*    316       BMP:    Recent Labs     01/03/24  0418 01/05/24  0506   * 138   K 3.7 3.6    102   CO2 23 25   BUN 27* 25*   CREATININE 1.6* 1.5*   GLUCOSE 141* 183*       Hepatic:   Recent Labs     01/05/24  0506   AST 13*   ALT 16   BILITOT 0.4   ALKPHOS 117     Lipids:   Lab Results   Component Value Date/Time    CHOL 103 04/03/2023 12:00 AM    HDL 34 04/03/2023 12:00 AM    TRIG 107 04/03/2023 12:00 AM     Hemoglobin A1C:   Lab Results   Component Value Date/Time    LABA1C 7.8 12/28/2023 03:53 AM     TSH: No results found for: \"TSH\"  Troponin: No results found for: \"TROPONINT\"  Lactic Acid: No results for input(s):

## 2024-01-05 NOTE — PLAN OF CARE
Problem: Discharge Planning  Goal: Discharge to home or other facility with appropriate resources  Outcome: Progressing     Problem: Safety - Adult  Goal: Free from fall injury  1/5/2024 0010 by Rafat Finley RN  Outcome: Progressing   Pt high fall risk. Instructed to use call light before getting out of bed. Call light within reach. Bed in low position. Bed alarm on.  Will continue to monitor.      Problem: Chronic Conditions and Co-morbidities  Goal: Patient's chronic conditions and co-morbidity symptoms are monitored and maintained or improved  1/5/2024 0010 by Rafat Finley RN  Outcome: Progressing   Pt will have accuchecks before meals and at bedtime with sliding scale insulin in place for coverage. Will continue to monitor for signs and symptoms of hypoglycemia and hyperglycemia throughout shift.

## 2024-01-06 VITALS
SYSTOLIC BLOOD PRESSURE: 133 MMHG | RESPIRATION RATE: 18 BRPM | HEART RATE: 82 BPM | DIASTOLIC BLOOD PRESSURE: 80 MMHG | HEIGHT: 70 IN | BODY MASS INDEX: 28.41 KG/M2 | OXYGEN SATURATION: 98 % | WEIGHT: 198.41 LBS | TEMPERATURE: 98.1 F

## 2024-01-06 LAB
CREAT UR-MCNC: 78.2 MG/DL (ref 39–259)
GLUCOSE BLD-MCNC: 84 MG/DL (ref 70–99)
MICROALBUMIN UR DL<=1MG/L-MCNC: 7.2 MG/DL
MICROALBUMIN/CREAT UR: 92.1 MG/G (ref 0–30)
PERFORMED ON: NORMAL

## 2024-01-06 PROCEDURE — 6370000000 HC RX 637 (ALT 250 FOR IP): Performed by: PODIATRIST

## 2024-01-06 PROCEDURE — 2580000003 HC RX 258: Performed by: PODIATRIST

## 2024-01-06 PROCEDURE — 6360000002 HC RX W HCPCS: Performed by: PODIATRIST

## 2024-01-06 RX ORDER — METOPROLOL SUCCINATE 25 MG/1
25 TABLET, EXTENDED RELEASE ORAL DAILY
Qty: 30 TABLET | Refills: 3 | Status: SHIPPED | OUTPATIENT
Start: 2024-01-07

## 2024-01-06 RX ORDER — FOLIC ACID 1 MG/1
1 TABLET ORAL DAILY
Qty: 30 TABLET | Refills: 3 | Status: SHIPPED | OUTPATIENT
Start: 2024-01-07

## 2024-01-06 RX ORDER — FERROUS SULFATE 325(65) MG
325 TABLET ORAL
Qty: 30 TABLET | Refills: 3 | Status: SHIPPED | OUTPATIENT
Start: 2024-01-08

## 2024-01-06 RX ORDER — OXYCODONE HYDROCHLORIDE AND ACETAMINOPHEN 5; 325 MG/1; MG/1
1 TABLET ORAL EVERY 6 HOURS PRN
Qty: 12 TABLET | Refills: 0 | Status: SHIPPED | OUTPATIENT
Start: 2024-01-06 | End: 2024-01-09

## 2024-01-06 RX ORDER — GABAPENTIN 300 MG/1
300 CAPSULE ORAL 3 TIMES DAILY
Qty: 90 CAPSULE | Refills: 1 | Status: SHIPPED | OUTPATIENT
Start: 2024-01-06 | End: 2024-03-06

## 2024-01-06 RX ORDER — AMOXICILLIN AND CLAVULANATE POTASSIUM 875; 125 MG/1; MG/1
1 TABLET, FILM COATED ORAL 2 TIMES DAILY
Qty: 14 TABLET | Refills: 0 | Status: SHIPPED | OUTPATIENT
Start: 2024-01-06 | End: 2024-01-13

## 2024-01-06 RX ORDER — FERROUS SULFATE 325(65) MG
325 TABLET ORAL
Status: DISCONTINUED | OUTPATIENT
Start: 2024-01-08 | End: 2024-01-06 | Stop reason: HOSPADM

## 2024-01-06 RX ADMIN — Medication 10 ML: at 09:15

## 2024-01-06 RX ADMIN — FOLIC ACID 1 MG: 1 TABLET ORAL at 09:15

## 2024-01-06 RX ADMIN — METOPROLOL SUCCINATE 25 MG: 25 TABLET, EXTENDED RELEASE ORAL at 09:15

## 2024-01-06 RX ADMIN — ASPIRIN 81 MG: 81 TABLET, COATED ORAL at 09:15

## 2024-01-06 RX ADMIN — PIPERACILLIN AND TAZOBACTAM 3375 MG: 3; .375 INJECTION, POWDER, FOR SOLUTION INTRAVENOUS at 01:11

## 2024-01-06 RX ADMIN — ENOXAPARIN SODIUM 40 MG: 100 INJECTION SUBCUTANEOUS at 09:15

## 2024-01-06 RX ADMIN — ROSUVASTATIN 10 MG: 10 TABLET, FILM COATED ORAL at 09:15

## 2024-01-06 RX ADMIN — FLUTICASONE PROPIONATE 1 SPRAY: 50 SPRAY, METERED NASAL at 09:20

## 2024-01-06 RX ADMIN — PIPERACILLIN AND TAZOBACTAM 3375 MG: 3; .375 INJECTION, POWDER, FOR SOLUTION INTRAVENOUS at 09:19

## 2024-01-06 RX ADMIN — GABAPENTIN 300 MG: 300 CAPSULE ORAL at 09:15

## 2024-01-06 NOTE — PLAN OF CARE
Problem: Discharge Planning  Goal: Discharge to home or other facility with appropriate resources  Outcome: Progressing     Problem: Chronic Conditions and Co-morbidities  Goal: Patient's chronic conditions and co-morbidity symptoms are monitored and maintained or improved  Outcome: Progressing     Problem: Pain  Goal: Verbalizes/displays adequate comfort level or baseline comfort level  Outcome: Progressing     Problem: Confusion  Goal: Confusion, delirium, dementia, or psychosis is improved or at baseline  Description: INTERVENTIONS:  1. Assess for possible contributors to thought disturbance, including medications, impaired vision or hearing, underlying metabolic abnormalities, dehydration, psychiatric diagnoses, and notify attending LIP  2. Glady high risk fall precautions, as indicated  3. Provide frequent short contacts to provide reality reorientation, refocusing and direction  4. Decrease environmental stimuli, including noise as appropriate  5. Monitor and intervene to maintain adequate nutrition, hydration, elimination, sleep and activity  6. If unable to ensure safety without constant attention obtain sitter and review sitter guidelines with assigned personnel  7. Initiate Psychosocial CNS and Spiritual Care consult, as indicated  Outcome: Progressing     Problem: Skin/Tissue Integrity  Goal: Absence of new skin breakdown  Description: 1.  Monitor for areas of redness and/or skin breakdown  2.  Assess vascular access sites hourly  3.  Every 4-6 hours minimum:  Change oxygen saturation probe site  4.  Every 4-6 hours:  If on nasal continuous positive airway pressure, respiratory therapy assess nares and determine need for appliance change or resting period.  Outcome: Progressing     Problem: Nutrition Deficit:  Goal: Optimize nutritional status  Outcome: Progressing

## 2024-01-06 NOTE — DISCHARGE INSTR - COC
Continuity of Care Form    Patient Name: Ant Carrasquillo   :  1955  MRN:  [id number]    Admit date:  2023  Discharge date:  24    Code Status Order: Full Code   Advance Directives:   Advance Care Flowsheet Documentation       Date/Time Healthcare Directive Type of Healthcare Directive Copy in Chart Healthcare Agent Appointed Healthcare Agent's Name Healthcare Agent's Phone Number    24 0759 No, patient does not have an advance directive for healthcare treatment -- -- -- -- --    23 1625 Yes, patient has an advance directive for healthcare treatment Durable power of  for health care Yes, copy in chart Healthcare power of  Janet [phone]            Admitting Physician:  Mercedes Nguyen MD  PCP: Minnie Coelho MD    Discharging Nurse: TERESE Abdi  Discharging Hospital Unit/Room#: 0206/0206-01  Discharging Unit Phone Number: [phone]    Emergency Contact:   Extended Emergency Contact Information  Primary Emergency Contact: Janet Carrasquillo  Address: [address] [place] [address]  Home Phone: [phone]  Mobile Phone: [phone]  Relation: Spouse    Past Surgical History:  Past Surgical History:   Procedure Laterality Date    APPENDECTOMY      COLONOSCOPY      EYE SURGERY Left     lasik    FOOT DEBRIDEMENT Right 2023    FOOT DEBRIDEMENT TO BONE WITH BONE BIOPSY  INCISION AND DRAINAGE RIGHT FOOT performed by Scout Camp DPM at [facility] OR    FOOT SURGERY Right 2017    big toe    TOE AMPUTATION Right 3/3/2020    AMPUTATION OF RIGHT GREAT TOE performed by Candi Nixon DPM at [facility] OR    TOE AMPUTATION Right 2024    RIGHT HALLUX AMPUTATION performed by Scout Camp DPM at [facility] OR       Immunization History:   Immunization History   Administered Date(s) Administered    COVID-19, PFIZER Bivalent, DO NOT Dilute, (age 12y+), IM, 30 mcg/0.3 mL 2023    COVID-19, PFIZER GRAY top, DO NOT Dilute,

## 2024-01-06 NOTE — OP NOTE
Operative Note      Patient: Ant Carrasquillo  YOB: 1955  MRN: [id number]    Date of Procedure: 1/4/2024    Pre-Op Diagnosis Codes:     * Osteomyelitis of right foot, unspecified type (HCC) [M86.9]    Post-Op Diagnosis: {MH OR SAME:362942444}       Procedure(s):  RIGHT HALLUX AMPUTATION    Surgeon(s):  Scout Camp DPM    Assistant:   Surgical Assistant: Dexter Kolb RN    Anesthesia: General    Estimated Blood Loss (mL): {NUMBERS; EBL:24377}    Complications: {Symptoms; Intra-op complications:87883}    Specimens:   ID Type Source Tests Collected by Time Destination   A : CLEARANCE MARGIN, RIGHT HALLUX Specimen Toe SURGICAL PATHOLOGY Scout Camp DPM 1/4/2024 0921        Implants:  * No implants in log *      Drains:   [REMOVED] Urinary Catheter 12/27/23 So-Temperature (Removed)   $ Urethral catheter insertion $ Not inserted for procedure 12/27/23 0615   Catheter Indications Urinary retention (acute or chronic), continuous bladder irrigation or bladder outlet obstruction 01/03/24 0845   Site Assessment No urethral drainage 01/03/24 0845   Urine Color Yellow 01/03/24 0845   Urine Appearance Clear 01/03/24 0845   Urine Odor Malodorous 01/02/24 0956   Collection Container Standard 01/03/24 0845   Securement Method Securing device (Describe) 01/03/24 0845   Catheter Care  Perineal wipes 01/03/24 0845   Catheter Best Practices  Drainage tube clipped to bed;Catheter secured to thigh;Tamper seal intact;Bag below bladder;Lack of dependent loop in tubing;Bag not on floor;Drainage bag less than half full 01/03/24 0845   Status Draining 01/03/24 0845   Output (mL) 750 mL 01/03/24 1553       Findings: ***        Detailed Description of Procedure:   ***    Electronically signed by Scout Camp DPM on 1/6/2024 at 6:56 AM    
was again irrigated with copious sterile saline and hemostasis obtained.     Attention was now turned towards the right hallux. Debridement and biopsy was performed exactly as stated above using sterile JamShidi Needle. Site irrigated with copious saline and hemostasis obtained.     The surgical sites were covered with betadine soaked adaptic, sterile 4x4s, ABD pad, cast padding, and ACE. Patient was awakened from anesthesia and will be transferred to PACU for further recovery, then back to his room. I will round on the patient in the next 24 hours, and await results from the specimens.    All counts were correct at the end of surgery.    Electronically signed by Scout Camp DPM on 1/4/2024 at 8:37 AM

## 2024-01-06 NOTE — CARE COORDINATION
CASE MANAGEMENT DISCHARGE SUMMARY      Discharge to: Home with [facility]       Transportation:    Family/car: yes      Confirmed discharge plan with:     Patient: yes per RN     Family:  no per pt       RN, name: Trinidad GUDINO    Note: Discharging nurse to complete LUIS F, reconcile AVS, and place final copy with patient's discharge packet. RN to ensure that written prescriptions for  Level II medications are sent with patient to the facility as per protocol.

## 2024-01-06 NOTE — DISCHARGE SUMMARY
January 7, 2024     gabapentin 300 MG capsule  Commonly known as: NEURONTIN  Take 1 capsule by mouth in the morning, at noon, and at bedtime for 60 days.  Replaces: gabapentin 800 MG tablet     metoprolol succinate 25 MG extended release tablet  Commonly known as: TOPROL XL  Take 1 tablet by mouth daily  Start taking on: January 7, 2024     oxyCODONE-acetaminophen 5-325 MG per tablet  Commonly known as: PERCOCET  Take 1 tablet by mouth every 6 hours as needed for Pain for up to 3 days. Max Daily Amount: 4 tablets            CONTINUE taking these medications      aspirin 325 MG tablet     diclofenac 75 MG EC tablet  Commonly known as: VOLTAREN  Take 1 tablet by mouth 2 times daily (with meals)     enalapril 10 MG tablet  Commonly known as: VASOTEC  Take 1 tablet by mouth 2 times daily     glipiZIDE 10 MG tablet  Commonly known as: GLUCOTROL  Take 1 tablet by mouth 2 times daily     metFORMIN 500 MG tablet  Commonly known as: GLUCOPHAGE  Take 2 tablets by mouth 2 times daily (with meals)     rosuvastatin 10 MG tablet  Commonly known as: CRESTOR  Take 1 tablet by mouth daily     VITAMIN B 12 PO            STOP taking these medications      gabapentin 800 MG tablet  Commonly known as: Neurontin  Replaced by: gabapentin 300 MG capsule               Where to Get Your Medications        These medications were sent to [facility] [id number] - [address] -  [phone] - F [phone]  [address] [place] [address]      Phone: [phone]   amoxicillin-clavulanate 875-125 MG per tablet  ferrous sulfate 325 (65 Fe) MG tablet  folic acid 1 MG tablet  gabapentin 300 MG capsule  metoprolol succinate 25 MG extended release tablet  oxyCODONE-acetaminophen 5-325 MG per tablet         Objective Findings at Discharge:       BMP/CBC  Recent Labs     01/05/24  0506      K 3.6      CO2 25   BUN 25*   CREATININE 1.5*   WBC 9.5   HCT 23.3*          IMAGING:      Additional Information:  Patient seen and examined day of discharge. For more information regarding patient's care please contact [facility] medical records [phone]    Discharge Time of 35 minutes    Electronically signed by Mai Rutledge MD on 1/6/2024 at 1:02 PM

## 2024-01-06 NOTE — DISCHARGE INSTR - DIET

## 2024-01-06 NOTE — PROGRESS NOTES
PIV removed. Tip intact. Dressing in place. Tele box removed. [facility] notified of pt discharge. Discharge paperwork went over with patient and spouse and given to pt. Verbalizes understanding. Wound care education provided to patient and spouse. Right tibial dressing change completed and patient and spouse aware next dressing change will be Monday 1/8/24. Supplies given for discharge. Pt wheeled via wheelchair to private car with all belongings. Pt discharge home in stable condition.

## 2024-01-06 NOTE — PROGRESS NOTES
hold.    NSTEMI:  - s/p stress test 1/2/2023, small apical and inferior reversible defect. Currently planning medical management.     Diabetic foot ulcer:  - per ID, podiatry.        PE:   Vitals:    01/06/24 0524   BP: 127/79   Pulse: 71   Resp: 18   Temp: 98.4 °F (36.9 °C)   SpO2: 98%       General appearance: in NAD  Respiratory: Respiratory effort appears normal, bilateral equal chest rise, no wheeze, no crackles  Cardiovascular: Ausculation shows RRR no edema  Abdomen: No visible mass or tenderness, non distended.

## 2024-01-08 ENCOUNTER — CARE COORDINATION (OUTPATIENT)
Dept: CASE MANAGEMENT | Age: 69
End: 2024-01-08

## 2024-01-08 DIAGNOSIS — N17.9 ACUTE KIDNEY INJURY SUPERIMPOSED ON CKD (HCC): Primary | ICD-10-CM

## 2024-01-08 DIAGNOSIS — N18.9 ACUTE KIDNEY INJURY SUPERIMPOSED ON CKD (HCC): Primary | ICD-10-CM

## 2024-01-08 PROCEDURE — 1111F DSCHRG MED/CURRENT MED MERGE: CPT | Performed by: INTERNAL MEDICINE

## 2024-01-08 NOTE — CARE COORDINATION
with PCP as he reports he is overwhelmed with too many apts he has with specialist etc. CTN discussed the importance of HFU apt and patient declined. Patient encouraged to contact podiatrist right away and schedule apt.     Follow Up  Future Appointments   Date Time Provider Department Center   1/24/2024 11:00 AM Enzweiler, Diane M, APRN - CNP [facility]   2/15/2024  9:15 AM FRANKIE Junior Jr., MD [facility]   2/27/2024  1:30 PM [facility] ECHO ROOM [facility] AND [facility]   3/8/2024  1:00 PM Jalen Kaufman MD [facility]   5/1/2024  9:30 AM Minnie Coelho MD [facility]       Care Transition Nurse provided contact information.  Plan for follow-up call in 5-7 days based on severity of symptoms and risk factors.  Plan for next call: self management-podiatry apt     Maren BROWN, RN, [facility]  Care Transition Nurse  [phone] mobile

## 2024-01-11 ENCOUNTER — CARE COORDINATION (OUTPATIENT)
Dept: CASE MANAGEMENT | Age: 69
End: 2024-01-11

## 2024-01-11 NOTE — CARE COORDINATION
Care Transitions Outreach Attempt      Attempted to reach patient for transitions of care follow up. Unable to reach patient no voicemail available to leave message phone rings, call was answered then disconnected after several attempts of CTN requesting response.     Patient: Ant Carrasquillo Patient : 1955 MRN: [id number]    Last Discharge Facility       Date Complaint Diagnosis Description Type Department Provider    23 Tremors; Emesis Septic shock (HCC) ... ED to Hosp-Admission (Discharged) (ADMITTED) Mai Shearer MD; [facility], ...              Noted following upcoming appointments from discharge chart review:   BS follow up appointment(s):   Future Appointments   Date Time Provider Department Center   2024 11:00 AM Enzweiler, Diane M, APRN - CNP [facility]   2/15/2024  9:15 AM FRANKIE Junior Jr., MD [facility]   2024  1:30 PM [facility] ECHO ROOM [facility] AND [facility]   3/8/2024  1:00 PM Jalen Kaufman MD [facility]   2024  9:30 AM Minnie Coelho MD [facility]     Non-BS  follow up appointment(s): .    Maren BROWN, RN, [facility]  Care Transition Nurse  [phone] mobile

## 2024-01-15 ENCOUNTER — TELEPHONE (OUTPATIENT)
Dept: CARDIOLOGY CLINIC | Age: 69
End: 2024-01-15

## 2024-01-15 NOTE — TELEPHONE ENCOUNTER
Pt came to the EP suite today stating he had an appointment. I informed pt that he does not have an appt with [facility] until 1/24/24 at 11am, and appt with [facility] for 2/15/24, as I was saying that [facility] appt, pt turned around stated \"CANCEL THEM ALL\". Pt left office. I cancelled the npde and agk per pt's request, but did not get to ask pt as he just left the office if he still wanted to keep the echo and fxw appts and if he would like a print out of anything related to appts.

## 2024-01-18 ENCOUNTER — CARE COORDINATION (OUTPATIENT)
Dept: CASE MANAGEMENT | Age: 69
End: 2024-01-18

## 2024-01-18 NOTE — CARE COORDINATION
Care Transitions Follow Up Call    Patient Current Location:  [place]    Care Transition Nurse contacted the patient by telephone. Verified name and  with patient as identifiers.    Patient: Ant Carrasquillo  Patient : 1955   MRN: [id number]  Reason for Admission: s/p hallux amputation   Discharge Date: 24 RARS: Readmission Risk Score: 14.2      Needs to be reviewed by the provider   Additional needs identified to be addressed with provider: No  none             Method of communication with provider: none.    CTN spoke with patient who reported he is doing fine. Patient reported his amputation site is healing well and denied any concerns. Patient reported he had follow up with surgeon and denied any changes to plan of care. Denies any acute needs at present time.  Agreeable to f/u calls.  Educated on the use of urgent care or physician’s 24 hr access line if assistance is needed after hours.     Addressed changes since last contact:  none  Discussed follow-up appointments. If no appointment was previously scheduled, appointment scheduling offered: Yes.   Is follow up appointment scheduled within 7 days of discharge?  No and Patient had CTN cancel apt with PCP as he reports he is overwhelmed with too many apts he has with specialist etc. CTN discussed the importance of HFU apt and patient declined. Patient encouraged to contact podiatrist right away and schedule apt.    .    Follow Up  Future Appointments   Date Time Provider Department Center   2024  1:30 PM [facility] ECHO ROOM [facility] AND [facility]   3/8/2024  1:00 PM Jalen Kaufman MD [facility]   2024  9:30 AM Minnie Coelho MD [facility] [place] [facility]     External follow up appointment(s): .    Care Transition Nurse reviewed medical action plan with patient and discussed any barriers to care and/or understanding of plan of care after discharge. Discussed appropriate site of care based on symptoms and resources available to patient including:

## 2024-01-25 ENCOUNTER — CARE COORDINATION (OUTPATIENT)
Dept: CASE MANAGEMENT | Age: 69
End: 2024-01-25

## 2024-01-25 NOTE — CARE COORDINATION
Care Transitions Outreach Attempt      Attempted to reach patient for transitions of care follow up. Unable to reach patient. LVM.     Patient: Ant Carrasquillo Patient : 1955 MRN: [id number]    Last Discharge Facility       Date Complaint Diagnosis Description Type Department Provider    23 Tremors; Emesis Septic shock (HCC) ... ED to Hosp-Admission (Discharged) (ADMITTED) Mai Shearer MD; [facility], ...                Noted following upcoming appointments from discharge chart review:   BS follow up appointment(s):   Future Appointments   Date Time Provider Department Center   2024  1:30 PM [facility] ECHO ROOM [facility] AND [facility]   3/8/2024  1:00 PM Jalen Kaufman MD [facility]   2024  9:30 AM Minnie Coelho MD [facility] [place] [facility]     Non-BS  follow up appointment(s): .    Maren QUACHN, RN, [facility]  Care Transition Nurse  [phone] mobile

## 2024-02-01 ENCOUNTER — CARE COORDINATION (OUTPATIENT)
Dept: CASE MANAGEMENT | Age: 69
End: 2024-02-01

## 2024-02-01 PROBLEM — R79.89 ELEVATED TROPONIN: Status: RESOLVED | Noted: 2024-01-02 | Resolved: 2024-02-01

## 2024-02-01 NOTE — CARE COORDINATION
Care Transitions Follow Up Call    Patient Current Location:  [place]    Care Transition Nurse contacted the patient by telephone. Verified name and  with patient as identifiers.    Patient: Ant Carrasquillo  Patient : 1955   MRN: [id number]  Reason for Admission: s/p hallux amputation   Discharge Date: 24 RARS: Readmission Risk Score: 14.2      Needs to be reviewed by the provider   Additional needs identified to be addressed with provider: No  none             Method of communication with provider: none.    CTN spoke with patient briefly. Patient reported he is doing well and foot has healed well. Patient denied any other issues or concerns and no need for any further follow up.     Addressed changes since last contact:  none  Discussed follow-up appointments. If no appointment was previously scheduled, appointment scheduling offered: Yes.   Is follow up appointment scheduled within 7 days of discharge?  No and Patient had CTN cancel apt with PCP as he reports he is overwhelmed with too many apts he has with specialist etc. CTN discussed the importance of HFU apt and patient declined. Patient encouraged to contact podiatrist right away and schedule apt.    .    Follow Up  Future Appointments   Date Time Provider Department Center   2024  1:30 PM [facility] ECHO ROOM [facility] AND [facility]   3/8/2024  1:00 PM Jalen Kaufman MD [facility]   2024  9:30 AM Minnie Coelho MD [facility]     External follow up appointment(s): .    Care Transition Nurse reviewed medical action plan with patient and discussed any barriers to care and/or understanding of plan of care after discharge. Discussed appropriate site of care based on symptoms and resources available to patient including: PCP  Specialist  When to call 911. The patient agrees to contact the PCP office for questions related to their healthcare.     Advance Care Planning:   reviewed and current.     Patients top risk factors for

## 2024-02-14 ENCOUNTER — OFFICE VISIT (OUTPATIENT)
Dept: INTERNAL MEDICINE CLINIC | Age: 69
End: 2024-02-14

## 2024-02-14 ENCOUNTER — APPOINTMENT (OUTPATIENT)
Dept: GENERAL RADIOLOGY | Age: 69
End: 2024-02-14
Payer: MEDICARE

## 2024-02-14 ENCOUNTER — HOSPITAL ENCOUNTER (EMERGENCY)
Age: 69
Discharge: HOME | End: 2024-02-14
Attending: EMERGENCY MEDICINE
Payer: MEDICARE

## 2024-02-14 VITALS — TEMPERATURE: 98.3 F | WEIGHT: 193 LBS | BODY MASS INDEX: 27.69 KG/M2

## 2024-02-14 VITALS
RESPIRATION RATE: 26 BRPM | SYSTOLIC BLOOD PRESSURE: 126 MMHG | OXYGEN SATURATION: 99 % | TEMPERATURE: 98.2 F | HEART RATE: 73 BPM | DIASTOLIC BLOOD PRESSURE: 82 MMHG

## 2024-02-14 DIAGNOSIS — L97.511 NON-PRESSURE CHRONIC ULCER OF OTHER PART OF RIGHT FOOT LIMITED TO BREAKDOWN OF SKIN (HCC): ICD-10-CM

## 2024-02-14 DIAGNOSIS — R52 BODY ACHES: Primary | ICD-10-CM

## 2024-02-14 DIAGNOSIS — I95.9 HYPOTENSION, UNSPECIFIED HYPOTENSION TYPE: ICD-10-CM

## 2024-02-14 DIAGNOSIS — E86.0 DEHYDRATION: ICD-10-CM

## 2024-02-14 DIAGNOSIS — M79.10 MYALGIA: Primary | ICD-10-CM

## 2024-02-14 DIAGNOSIS — M06.9 RHEUMATOID ARTHRITIS INVOLVING MULTIPLE JOINTS (HCC): ICD-10-CM

## 2024-02-14 DIAGNOSIS — E11.628 TYPE 2 DIABETES MELLITUS WITH OTHER SKIN COMPLICATIONS (HCC): ICD-10-CM

## 2024-02-14 LAB
ALBUMIN SERPL-MCNC: 3.7 G/DL (ref 3.4–5)
ALBUMIN/GLOB SERPL: 0.9 {RATIO} (ref 1.1–2.2)
ALP SERPL-CCNC: 106 U/L (ref 40–129)
ALT SERPL-CCNC: 23 U/L (ref 10–40)
ANION GAP SERPL CALCULATED.3IONS-SCNC: 16 MMOL/L (ref 3–16)
AST SERPL-CCNC: 15 U/L (ref 15–37)
BASOPHILS # BLD: 0 K/UL (ref 0–0.2)
BASOPHILS NFR BLD: 0.3 %
BILIRUB SERPL-MCNC: 0.5 MG/DL (ref 0–1)
BUN SERPL-MCNC: 43 MG/DL (ref 7–20)
CALCIUM SERPL-MCNC: 9.7 MG/DL (ref 8.3–10.6)
CHLORIDE SERPL-SCNC: 98 MMOL/L (ref 99–110)
CO2 SERPL-SCNC: 18 MMOL/L (ref 21–32)
CREAT SERPL-MCNC: 1.6 MG/DL (ref 0.8–1.3)
CRP SERPL-MCNC: 90.1 MG/L (ref 0–5.1)
DEPRECATED RDW RBC AUTO: 14.9 % (ref 12.4–15.4)
EOSINOPHIL # BLD: 0 K/UL (ref 0–0.6)
EOSINOPHIL NFR BLD: 0.2 %
ERYTHROCYTE [SEDIMENTATION RATE] IN BLOOD BY WESTERGREN METHOD: 77 MM/HR (ref 0–20)
FLUAV RNA RESP QL NAA+PROBE: NOT DETECTED
FLUBV RNA RESP QL NAA+PROBE: NOT DETECTED
GFR SERPLBLD CREATININE-BSD FMLA CKD-EPI: 47 ML/MIN/{1.73_M2}
GLUCOSE SERPL-MCNC: 169 MG/DL (ref 70–99)
HCT VFR BLD AUTO: 32.5 % (ref 40.5–52.5)
HGB BLD-MCNC: 10.7 G/DL (ref 13.5–17.5)
LACTATE BLDV-SCNC: 1.6 MMOL/L (ref 0.4–2)
LACTATE BLDV-SCNC: 2.7 MMOL/L (ref 0.4–1.9)
LYMPHOCYTES # BLD: 1.6 K/UL (ref 1–5.1)
LYMPHOCYTES NFR BLD: 15.4 %
MCH RBC QN AUTO: 29.4 PG (ref 26–34)
MCHC RBC AUTO-ENTMCNC: 33 G/DL (ref 31–36)
MCV RBC AUTO: 89.3 FL (ref 80–100)
MONOCYTES # BLD: 0.7 K/UL (ref 0–1.3)
MONOCYTES NFR BLD: 6.6 %
NEUTROPHILS # BLD: 7.8 K/UL (ref 1.7–7.7)
NEUTROPHILS NFR BLD: 77.5 %
PLATELET # BLD AUTO: 416 K/UL (ref 135–450)
PMV BLD AUTO: 7.3 FL (ref 5–10.5)
POTASSIUM SERPL-SCNC: 5.1 MMOL/L (ref 3.5–5.1)
PROT SERPL-MCNC: 7.8 G/DL (ref 6.4–8.2)
RBC # BLD AUTO: 3.64 M/UL (ref 4.2–5.9)
SARS-COV-2 RNA RESP QL NAA+PROBE: NOT DETECTED
SODIUM SERPL-SCNC: 132 MMOL/L (ref 136–145)
WBC # BLD AUTO: 10.1 K/UL (ref 4–11)

## 2024-02-14 PROCEDURE — 85025 COMPLETE CBC W/AUTO DIFF WBC: CPT

## 2024-02-14 PROCEDURE — 84443 ASSAY THYROID STIM HORMONE: CPT

## 2024-02-14 PROCEDURE — 87636 SARSCOV2 & INF A&B AMP PRB: CPT

## 2024-02-14 PROCEDURE — 96360 HYDRATION IV INFUSION INIT: CPT

## 2024-02-14 PROCEDURE — 85652 RBC SED RATE AUTOMATED: CPT

## 2024-02-14 PROCEDURE — 2580000003 HC RX 258: Performed by: NURSE PRACTITIONER

## 2024-02-14 PROCEDURE — 87186 SC STD MICRODIL/AGAR DIL: CPT

## 2024-02-14 PROCEDURE — 86140 C-REACTIVE PROTEIN: CPT

## 2024-02-14 PROCEDURE — 87205 SMEAR GRAM STAIN: CPT

## 2024-02-14 PROCEDURE — 73630 X-RAY EXAM OF FOOT: CPT

## 2024-02-14 PROCEDURE — 87077 CULTURE AEROBIC IDENTIFY: CPT

## 2024-02-14 PROCEDURE — 80053 COMPREHEN METABOLIC PANEL: CPT

## 2024-02-14 PROCEDURE — 83605 ASSAY OF LACTIC ACID: CPT

## 2024-02-14 PROCEDURE — 99284 EMERGENCY DEPT VISIT MOD MDM: CPT

## 2024-02-14 PROCEDURE — 96361 HYDRATE IV INFUSION ADD-ON: CPT

## 2024-02-14 PROCEDURE — 87040 BLOOD CULTURE FOR BACTERIA: CPT

## 2024-02-14 PROCEDURE — 87070 CULTURE OTHR SPECIMN AEROBIC: CPT

## 2024-02-14 RX ORDER — 0.9 % SODIUM CHLORIDE 0.9 %
30 INTRAVENOUS SOLUTION INTRAVENOUS ONCE
Status: COMPLETED | OUTPATIENT
Start: 2024-02-14 | End: 2024-02-14

## 2024-02-14 RX ADMIN — SODIUM CHLORIDE 2625 ML: 9 INJECTION, SOLUTION INTRAVENOUS at 17:23

## 2024-02-15 ENCOUNTER — CARE COORDINATION (OUTPATIENT)
Dept: CARE COORDINATION | Age: 69
End: 2024-02-15

## 2024-02-15 LAB — TSH SERPL DL<=0.005 MIU/L-ACNC: 3.33 UIU/ML (ref 0.27–4.2)

## 2024-02-15 NOTE — CARE COORDINATION
ACM outreach made for ED follow up with no answer. Left VM to return call. Will attempt at a later date/time.

## 2024-02-15 NOTE — ED PROVIDER NOTES
METABOLIC PANEL W/ REFLEX TO MG FOR LOW K - Abnormal; Notable for the following components:    Sodium 132 (*)     Chloride 98 (*)     CO2 18 (*)     Glucose 169 (*)     BUN 43 (*)     Creatinine 1.6 (*)     Est, Glokimberly Filt Rate 47 (*)     Albumin/Globulin Ratio 0.9 (*)     All other components within normal limits   LACTATE, SEPSIS - Abnormal; Notable for the following components:    Lactic Acid, Sepsis 2.7 (*)     All other components within normal limits   C-REACTIVE PROTEIN - Abnormal; Notable for the following components:    CRP 90.1 (*)     All other components within normal limits   SEDIMENTATION RATE - Abnormal; Notable for the following components:    Sed Rate 77 (*)     All other components within normal limits   COVID-19 & INFLUENZA COMBO   CULTURE, WOUND    Narrative:     ORDER#: [id number]                          ORDERED BY: KEITH CHOPRA  SOURCE: Foot                               COLLECTED:  02/14/24 15:30  ANTIBIOTICS AT MARY.:                      RECEIVED :  02/14/24 15:37   CULTURE, BLOOD 1   CULTURE, BLOOD 2   LACTIC ACID   LACTATE, SEPSIS   TSH WITH REFLEX         PLAN:   -Patient seen and evaluated.  Relevant records reviewed.        9:42 PM EST  Patient has finished his third liter and says he feels significantly better.  He says he only drinks 4-5 water bottles a day, and says \"I can tell when I am dehydrated, because everything hurts including my shoulders.\"  He says he feels like going home, and that he is going to can try to keep he his intake level elevated.    No signs of infection, he does have a little bit of a foot wound, but that has been chronic, no Gram stain findings, no leukocytosis, and he says it is actually better than it previously has been.  His ESR and CRP are also improved from previous.    Over the course of his ED stay, blood pressure went from 70 systolic up to 119/74 and he is feeling significantly better.  Will discharge home this patient in stable condition with 
the following and evaluation of this patient.  CELLULITIS, COMPARTMENT SYNDROME, NECROTIZING FASCIITIS, TENDON OR NEUROVASCULAR INJURY, or FOREIGN BODY, SEPSIS, SEPTIC SHOCK.    I am the Primary Clinician of Record.  FINAL IMPRESSION      1. Body aches    2. Hypotension, unspecified hypotension type    3. Dehydration          DISPOSITION/PLAN     DISPOSITION Pending      (Please note that portions of this note were completed with a voice recognition program.  Efforts were made to edit the dictations but occasionally words are mis-transcribed.)    GILBERT Arreguin CNP (electronically signed)        Janki Heck APRN - CNP  02/15/24 1940

## 2024-02-15 NOTE — ED NOTES
Ambulated pt approx 100 feet with the use of a walker. Pt had stable gait and no complaints of CP, SOB, dizziness, or lightheadedness.

## 2024-02-16 ENCOUNTER — CARE COORDINATION (OUTPATIENT)
Dept: CARE COORDINATION | Age: 69
End: 2024-02-16

## 2024-02-16 NOTE — CARE COORDINATION
ACM outreach made for ED follow up with no answer, 2nd attempt. Left VM to return call. Will attempt again at a later date/time.

## 2024-02-17 LAB
BACTERIA SPEC AEROBE CULT: ABNORMAL
GRAM STN SPEC: ABNORMAL
ORGANISM: ABNORMAL

## 2024-02-18 LAB
BACTERIA BLD CULT ORG #2: NORMAL
BACTERIA BLD CULT: NORMAL

## 2024-02-19 ENCOUNTER — CARE COORDINATION (OUTPATIENT)
Dept: CARE COORDINATION | Age: 69
End: 2024-02-19

## 2024-02-19 NOTE — CARE COORDINATION
ACM outreach made for ED follow up. Spoke briefly with patient who states that he's okay. Patient states his BS have been all over the place but have been under 200's. Patient states that he hasn't taken his BP meds since leaving the hospital and hasn't checked it at home. Patient states that the wound on his foot opened back up but that he sees the podiatrist this Wednesday. ACM explained ACM role. Patient did not wish to receive any assistance from [facility] at this time. No further outreaches to be made.

## 2024-02-27 ENCOUNTER — HOSPITAL ENCOUNTER (OUTPATIENT)
Dept: CARDIOLOGY | Age: 69
Discharge: HOME | End: 2024-02-27
Payer: MEDICARE

## 2024-02-27 DIAGNOSIS — I42.8 OTHER CARDIOMYOPATHY (HCC): ICD-10-CM

## 2024-02-27 PROCEDURE — 93308 TTE F-UP OR LMTD: CPT

## 2024-02-28 ENCOUNTER — TELEPHONE (OUTPATIENT)
Dept: CARDIOLOGY CLINIC | Age: 69
End: 2024-02-28

## 2024-02-28 PROCEDURE — 93228 REMOTE 30 DAY ECG REV/REPORT: CPT | Performed by: INTERNAL MEDICINE

## 2024-02-28 NOTE — TELEPHONE ENCOUNTER
[facility] for pt to return call at [phone] regarding test results    The number listed [phone] when calling the person answering said they were the the pt.

## 2024-02-28 NOTE — TELEPHONE ENCOUNTER
----- Message from Ko Mckeon MD sent at 2/27/2024  5:14 PM EST -----  Let patient know echo test shows improved heart function, no new orders or changes at this time.  Thanks.

## 2024-03-05 ENCOUNTER — HOSPITAL ENCOUNTER (OUTPATIENT)
Age: 69
Discharge: HOME | End: 2024-03-05
Payer: MEDICARE

## 2024-03-05 ENCOUNTER — HOSPITAL ENCOUNTER (OUTPATIENT)
Dept: GENERAL RADIOLOGY | Age: 69
Discharge: HOME | End: 2024-03-05
Payer: MEDICARE

## 2024-03-05 DIAGNOSIS — G62.9 NEUROPATHY: ICD-10-CM

## 2024-03-05 DIAGNOSIS — M25.50 ARTHRALGIA, UNSPECIFIED JOINT: ICD-10-CM

## 2024-03-05 LAB
HBV SURFACE AB SERPL IA-ACNC: 8.64 MIU/ML
HBV SURFACE AG SERPL QL IA: NORMAL
HCV AB SERPL QL IA: NORMAL
RHEUMATOID FACT SER IA-ACNC: >650 IU/ML
URATE SERPL-MCNC: 6.8 MG/DL (ref 3.5–7.2)

## 2024-03-05 PROCEDURE — 86803 HEPATITIS C AB TEST: CPT

## 2024-03-05 PROCEDURE — 84550 ASSAY OF BLOOD/URIC ACID: CPT

## 2024-03-05 PROCEDURE — 86431 RHEUMATOID FACTOR QUANT: CPT

## 2024-03-05 PROCEDURE — 86706 HEP B SURFACE ANTIBODY: CPT

## 2024-03-05 PROCEDURE — 36415 COLL VENOUS BLD VENIPUNCTURE: CPT

## 2024-03-05 PROCEDURE — 87340 HEPATITIS B SURFACE AG IA: CPT

## 2024-03-05 PROCEDURE — 86200 CCP ANTIBODY: CPT

## 2024-03-05 PROCEDURE — 86480 TB TEST CELL IMMUN MEASURE: CPT

## 2024-03-05 PROCEDURE — 73130 X-RAY EXAM OF HAND: CPT

## 2024-03-05 PROCEDURE — 73080 X-RAY EXAM OF ELBOW: CPT

## 2024-03-05 PROCEDURE — 86704 HEP B CORE ANTIBODY TOTAL: CPT

## 2024-03-06 DIAGNOSIS — M15.8 OTHER OSTEOARTHRITIS INVOLVING MULTIPLE JOINTS: ICD-10-CM

## 2024-03-06 NOTE — PROGRESS NOTES
masses, no organomegaly  Extremities - Extremities normal, atraumatic, no cyanosis or edema  Pulses - 2+ and symmetric upper and lower extremities  Skin - Skin color, texture, turgor normal, no rashes or lesions      LABS:  Lab Results   Component Value Date/Time    WBC 10.1 02/14/2024 02:57 PM    RBC 3.64 02/14/2024 02:57 PM    HGB 10.7 02/14/2024 02:57 PM    HCT 32.5 02/14/2024 02:57 PM    MCV 89.3 02/14/2024 02:57 PM    RDW 14.9 02/14/2024 02:57 PM     02/14/2024 02:57 PM     Lab Results   Component Value Date/Time     02/14/2024 02:57 PM    K 5.1 02/14/2024 02:57 PM    CL 98 02/14/2024 02:57 PM    CO2 18 02/14/2024 02:57 PM    BUN 43 02/14/2024 02:57 PM    CREATININE 1.6 02/14/2024 02:57 PM    AGRATIO 0.9 02/14/2024 02:57 PM    LABGLOM 47 02/14/2024 02:57 PM    LABGLOM 83 04/04/2023 12:00 AM    GLUCOSE 169 02/14/2024 02:57 PM    PROT 7.8 02/14/2024 02:57 PM    CALCIUM 9.7 02/14/2024 02:57 PM    BILITOT 0.5 02/14/2024 02:57 PM    ALKPHOS 106 02/14/2024 02:57 PM    AST 15 02/14/2024 02:57 PM    ALT 23 02/14/2024 02:57 PM     Lab Results   Component Value Date    CHOL 103 04/03/2023     Lab Results   Component Value Date    TRIG 107 04/03/2023     Lab Results   Component Value Date    LDLCALC 49 04/03/2023         CARDIAC STUDIES    ECG:     Echo: 2/27/2024  Summary   Limited echo for LV function.   The left ventricular systolic function is low normal, LVEF 50%.   Grade I diastolic dysfunction with normal filing pressure.   Last echo on 12/28/2023 showed EF of 35-40%.    Echo limited: 12/28/2023   Summary   Ejection fraction is difficult to assess due to irregular rhythm but appears   to be visually estimated to be 35-40 %.   There is moderate diffuse hypokinesis. Definity echo contrast is given, no   intracardiac thrombus.   No evidence of any pericardial effusion.   An agitated saline study was performed and there appears to be right to left   interatrial shunt consistent with patent foramen

## 2024-03-07 LAB
CCP IGA+IGG SERPL IA-ACNC: 170 UNITS (ref 0–19)
HBV CORE AB SERPL QL IA: NEGATIVE

## 2024-03-07 RX ORDER — DICLOFENAC SODIUM 75 MG/1
TABLET, DELAYED RELEASE ORAL
Qty: 60 TABLET | Refills: 2 | Status: SHIPPED | OUTPATIENT
Start: 2024-03-07

## 2024-03-08 ENCOUNTER — OFFICE VISIT (OUTPATIENT)
Dept: CARDIOLOGY CLINIC | Age: 69
End: 2024-03-08
Payer: MEDICARE

## 2024-03-08 VITALS
WEIGHT: 197 LBS | OXYGEN SATURATION: 99 % | HEIGHT: 70 IN | SYSTOLIC BLOOD PRESSURE: 122 MMHG | DIASTOLIC BLOOD PRESSURE: 66 MMHG | HEART RATE: 76 BPM | BODY MASS INDEX: 28.2 KG/M2

## 2024-03-08 DIAGNOSIS — E11.69 HYPERLIPIDEMIA ASSOCIATED WITH TYPE 2 DIABETES MELLITUS (HCC): ICD-10-CM

## 2024-03-08 DIAGNOSIS — E78.5 HYPERLIPIDEMIA ASSOCIATED WITH TYPE 2 DIABETES MELLITUS (HCC): ICD-10-CM

## 2024-03-08 DIAGNOSIS — I48.0 PAF (PAROXYSMAL ATRIAL FIBRILLATION) (HCC): ICD-10-CM

## 2024-03-08 DIAGNOSIS — I42.9 CARDIOMYOPATHY, UNSPECIFIED TYPE (HCC): ICD-10-CM

## 2024-03-08 DIAGNOSIS — I10 ESSENTIAL HYPERTENSION: Primary | ICD-10-CM

## 2024-03-08 LAB
GAMMA INTERFERON BACKGROUND BLD IA-ACNC: 0.02 IU/ML
MITOGEN IGNF BCKGRD COR BLD-ACNC: 3.19 IU/ML
QUANTI TB GOLD PLUS: NEGATIVE
QUANTI TB1 MINUS NIL: 0.01 IU/ML (ref 0–0.34)
QUANTI TB2 MINUS NIL: 0.01 IU/ML (ref 0–0.34)

## 2024-03-08 PROCEDURE — 1036F TOBACCO NON-USER: CPT | Performed by: INTERNAL MEDICINE

## 2024-03-08 PROCEDURE — 3074F SYST BP LT 130 MM HG: CPT | Performed by: INTERNAL MEDICINE

## 2024-03-08 PROCEDURE — G8484 FLU IMMUNIZE NO ADMIN: HCPCS | Performed by: INTERNAL MEDICINE

## 2024-03-08 PROCEDURE — 99214 OFFICE O/P EST MOD 30 MIN: CPT | Performed by: INTERNAL MEDICINE

## 2024-03-08 PROCEDURE — 3078F DIAST BP <80 MM HG: CPT | Performed by: INTERNAL MEDICINE

## 2024-03-08 PROCEDURE — 2022F DILAT RTA XM EVC RTNOPTHY: CPT | Performed by: INTERNAL MEDICINE

## 2024-03-08 PROCEDURE — G8417 CALC BMI ABV UP PARAM F/U: HCPCS | Performed by: INTERNAL MEDICINE

## 2024-03-08 PROCEDURE — 3051F HG A1C>EQUAL 7.0%<8.0%: CPT | Performed by: INTERNAL MEDICINE

## 2024-03-08 PROCEDURE — 3017F COLORECTAL CA SCREEN DOC REV: CPT | Performed by: INTERNAL MEDICINE

## 2024-03-08 PROCEDURE — G8427 DOCREV CUR MEDS BY ELIG CLIN: HCPCS | Performed by: INTERNAL MEDICINE

## 2024-03-08 PROCEDURE — 1123F ACP DISCUSS/DSCN MKR DOCD: CPT | Performed by: INTERNAL MEDICINE

## 2024-03-08 RX ORDER — METOPROLOL SUCCINATE 25 MG/1
25 TABLET, EXTENDED RELEASE ORAL DAILY
Qty: 90 TABLET | Refills: 3 | Status: SHIPPED | OUTPATIENT
Start: 2024-03-08

## 2024-03-08 RX ORDER — ROSUVASTATIN CALCIUM 10 MG/1
10 TABLET, COATED ORAL DAILY
Qty: 90 TABLET | Refills: 3 | Status: SHIPPED | OUTPATIENT
Start: 2024-03-08

## 2024-03-08 RX ORDER — HYDROXYCHLOROQUINE SULFATE 200 MG/1
TABLET, FILM COATED ORAL
COMMUNITY
Start: 2024-03-01

## 2024-03-08 NOTE — PATIENT INSTRUCTIONS
PLAN:  Continue current cardiac medications  No further cardiac testing at this time  Follow up with me in 1 year or sooner if needed

## 2024-03-11 ENCOUNTER — TELEPHONE (OUTPATIENT)
Dept: CARDIOLOGY CLINIC | Age: 69
End: 2024-03-11

## 2024-03-11 DIAGNOSIS — I48.0 PAF (PAROXYSMAL ATRIAL FIBRILLATION) (HCC): Primary | ICD-10-CM

## 2024-03-25 ENCOUNTER — OFFICE VISIT (OUTPATIENT)
Dept: INTERNAL MEDICINE CLINIC | Age: 69
End: 2024-03-25
Payer: MEDICARE

## 2024-03-25 VITALS
HEART RATE: 72 BPM | OXYGEN SATURATION: 99 % | HEIGHT: 70 IN | SYSTOLIC BLOOD PRESSURE: 92 MMHG | WEIGHT: 194 LBS | DIASTOLIC BLOOD PRESSURE: 60 MMHG | BODY MASS INDEX: 27.77 KG/M2

## 2024-03-25 DIAGNOSIS — E11.628 TYPE 2 DIABETES MELLITUS WITH OTHER SKIN COMPLICATIONS (HCC): Primary | ICD-10-CM

## 2024-03-25 DIAGNOSIS — G62.9 NEUROPATHY: ICD-10-CM

## 2024-03-25 DIAGNOSIS — I10 ESSENTIAL HYPERTENSION: ICD-10-CM

## 2024-03-25 DIAGNOSIS — I48.0 PAF (PAROXYSMAL ATRIAL FIBRILLATION) (HCC): ICD-10-CM

## 2024-03-25 DIAGNOSIS — E78.5 HYPERLIPIDEMIA ASSOCIATED WITH TYPE 2 DIABETES MELLITUS (HCC): ICD-10-CM

## 2024-03-25 DIAGNOSIS — E11.69 HYPERLIPIDEMIA ASSOCIATED WITH TYPE 2 DIABETES MELLITUS (HCC): ICD-10-CM

## 2024-03-25 LAB — HBA1C MFR BLD: 7.6 %

## 2024-03-25 PROCEDURE — 3074F SYST BP LT 130 MM HG: CPT | Performed by: INTERNAL MEDICINE

## 2024-03-25 PROCEDURE — 1036F TOBACCO NON-USER: CPT | Performed by: INTERNAL MEDICINE

## 2024-03-25 PROCEDURE — 2022F DILAT RTA XM EVC RTNOPTHY: CPT | Performed by: INTERNAL MEDICINE

## 2024-03-25 PROCEDURE — G8417 CALC BMI ABV UP PARAM F/U: HCPCS | Performed by: INTERNAL MEDICINE

## 2024-03-25 PROCEDURE — 3078F DIAST BP <80 MM HG: CPT | Performed by: INTERNAL MEDICINE

## 2024-03-25 PROCEDURE — 1123F ACP DISCUSS/DSCN MKR DOCD: CPT | Performed by: INTERNAL MEDICINE

## 2024-03-25 PROCEDURE — 99214 OFFICE O/P EST MOD 30 MIN: CPT | Performed by: INTERNAL MEDICINE

## 2024-03-25 PROCEDURE — G8484 FLU IMMUNIZE NO ADMIN: HCPCS | Performed by: INTERNAL MEDICINE

## 2024-03-25 PROCEDURE — 3051F HG A1C>EQUAL 7.0%<8.0%: CPT | Performed by: INTERNAL MEDICINE

## 2024-03-25 PROCEDURE — 3017F COLORECTAL CA SCREEN DOC REV: CPT | Performed by: INTERNAL MEDICINE

## 2024-03-25 PROCEDURE — G8427 DOCREV CUR MEDS BY ELIG CLIN: HCPCS | Performed by: INTERNAL MEDICINE

## 2024-03-25 RX ORDER — GABAPENTIN 800 MG/1
800 TABLET ORAL NIGHTLY
Qty: 90 TABLET | Refills: 0 | COMMUNITY
Start: 2024-03-25

## 2024-03-25 ASSESSMENT — PATIENT HEALTH QUESTIONNAIRE - PHQ9
SUM OF ALL RESPONSES TO PHQ QUESTIONS 1-9: 0
SUM OF ALL RESPONSES TO PHQ9 QUESTIONS 1 & 2: 0
1. LITTLE INTEREST OR PLEASURE IN DOING THINGS: NOT AT ALL
2. FEELING DOWN, DEPRESSED OR HOPELESS: NOT AT ALL

## 2024-03-25 NOTE — PROGRESS NOTES
Ant Carrasquillo  YOB: 1955    Date of Service:  3/25/2024    Chief Complaint:      Chief Complaint   Patient presents with    Diabetes    Joint Pain       Assessment/Plan:  Ant was seen today for diabetes and joint pain.    Diagnoses and all orders for this visit:    Type 2 diabetes mellitus with other skin complications (HCC)  -     POCT glycosylated hemoglobin (Hb A1C)    Essential hypertension    Hyperlipidemia associated with type 2 diabetes mellitus (HCC)    Neuropathy    PAF (paroxysmal atrial fibrillation) (HCC)      Stable and continue on current medications.    Return keep Fasting AWV and diabetes 5/1.      HPI:  Ant Carrasquillo is a 68 y.o.    Treatment Adherence:   Medication compliance:  compliant most of the time  Diet compliance:  compliant most of the time  Weight trend: stable  Current exercise: walks 5 time(s) per week  Barriers: none     Diabetes Mellitus Type 2: Current symptoms/problems include Neuropathy on Neurontin 800 mg qhs     Home blood sugar records: fasting range: 140-150 AM and 120-170 PM  Glipizide 10 mg twice a day and Metformin 500 mg 2 bid  Any episodes of hypoglycemia? no  Eye exam current (within one year): no  Tobacco history: He  reports that he has never smoked. He has never used smokeless tobacco.   Daily Aspirin? Yes  Known diabetic complications: autonomic neuropathy     Hypertension:  Home blood pressure monitoring: Yes - on Vasotec 10 mg bid.  He is adherent to a low sodium diet. Patient denies chest pain, shortness of breath, headache, lightheadedness, blurred vision, peripheral edema, palpitations, dry cough, and fatigue.  Antihypertensive medication side effects: no medication side effects noted.  Use of agents associated with hypertension: none.      Hyperlipidemia:  No new myalgias or GI upset on rosuvastatin (Crestor) 10 mg qd.       Lab Results   Component Value Date    LABA1C 7.6 03/25/2024    LABA1C 7.8 12/28/2023    LABA1C 7.0 11/01/2023 
Sandra Chisholm  (RN)  2019 00:31:33

## 2024-04-02 RX ORDER — FOLIC ACID 1 MG/1
1 TABLET ORAL DAILY
Qty: 30 TABLET | Refills: 0 | Status: SHIPPED | OUTPATIENT
Start: 2024-04-02

## 2024-05-01 ENCOUNTER — OFFICE VISIT (OUTPATIENT)
Dept: INTERNAL MEDICINE CLINIC | Age: 69
End: 2024-05-01

## 2024-05-01 VITALS
BODY MASS INDEX: 28.92 KG/M2 | HEIGHT: 70 IN | SYSTOLIC BLOOD PRESSURE: 112 MMHG | WEIGHT: 202 LBS | DIASTOLIC BLOOD PRESSURE: 58 MMHG | HEART RATE: 66 BPM | OXYGEN SATURATION: 98 %

## 2024-05-01 DIAGNOSIS — E78.5 HYPERLIPIDEMIA ASSOCIATED WITH TYPE 2 DIABETES MELLITUS (HCC): ICD-10-CM

## 2024-05-01 DIAGNOSIS — Z00.00 MEDICARE ANNUAL WELLNESS VISIT, SUBSEQUENT: Primary | ICD-10-CM

## 2024-05-01 DIAGNOSIS — M15.8 OTHER OSTEOARTHRITIS INVOLVING MULTIPLE JOINTS: ICD-10-CM

## 2024-05-01 DIAGNOSIS — E11.43 TYPE 2 DIABETES MELLITUS WITH DIABETIC AUTONOMIC NEUROPATHY, WITHOUT LONG-TERM CURRENT USE OF INSULIN (HCC): ICD-10-CM

## 2024-05-01 DIAGNOSIS — G62.9 NEUROPATHY: ICD-10-CM

## 2024-05-01 DIAGNOSIS — E11.69 HYPERLIPIDEMIA ASSOCIATED WITH TYPE 2 DIABETES MELLITUS (HCC): ICD-10-CM

## 2024-05-01 DIAGNOSIS — Z79.899 CONTROLLED SUBSTANCE AGREEMENT SIGNED: ICD-10-CM

## 2024-05-01 DIAGNOSIS — N18.30 STAGE 3 CHRONIC KIDNEY DISEASE, UNSPECIFIED WHETHER STAGE 3A OR 3B CKD (HCC): ICD-10-CM

## 2024-05-01 DIAGNOSIS — M25.422 EFFUSION OF BURSA OF LEFT ELBOW: ICD-10-CM

## 2024-05-01 RX ORDER — DAPAGLIFLOZIN 10 MG/1
10 TABLET, FILM COATED ORAL EVERY MORNING
Qty: 90 TABLET | Refills: 0 | Status: SHIPPED | OUTPATIENT
Start: 2024-05-01

## 2024-05-01 RX ORDER — GABAPENTIN 800 MG/1
800 TABLET ORAL NIGHTLY
Qty: 90 TABLET | Refills: 1 | Status: SHIPPED | OUTPATIENT
Start: 2024-05-01 | End: 2024-10-28

## 2024-05-01 RX ORDER — GLIPIZIDE 10 MG/1
10 TABLET ORAL 2 TIMES DAILY
Qty: 180 TABLET | Refills: 3 | Status: SHIPPED | OUTPATIENT
Start: 2024-05-01

## 2024-05-01 SDOH — ECONOMIC STABILITY: FOOD INSECURITY: WITHIN THE PAST 12 MONTHS, YOU WORRIED THAT YOUR FOOD WOULD RUN OUT BEFORE YOU GOT MONEY TO BUY MORE.: NEVER TRUE

## 2024-05-01 SDOH — ECONOMIC STABILITY: FOOD INSECURITY: WITHIN THE PAST 12 MONTHS, THE FOOD YOU BOUGHT JUST DIDN'T LAST AND YOU DIDN'T HAVE MONEY TO GET MORE.: NEVER TRUE

## 2024-05-01 SDOH — ECONOMIC STABILITY: INCOME INSECURITY: HOW HARD IS IT FOR YOU TO PAY FOR THE VERY BASICS LIKE FOOD, HOUSING, MEDICAL CARE, AND HEATING?: NOT HARD AT ALL

## 2024-05-01 ASSESSMENT — PATIENT HEALTH QUESTIONNAIRE - PHQ9
SUM OF ALL RESPONSES TO PHQ QUESTIONS 1-9: 0
SUM OF ALL RESPONSES TO PHQ QUESTIONS 1-9: 0
2. FEELING DOWN, DEPRESSED OR HOPELESS: NOT AT ALL
SUM OF ALL RESPONSES TO PHQ9 QUESTIONS 1 & 2: 0
SUM OF ALL RESPONSES TO PHQ QUESTIONS 1-9: 0
1. LITTLE INTEREST OR PLEASURE IN DOING THINGS: NOT AT ALL
SUM OF ALL RESPONSES TO PHQ QUESTIONS 1-9: 0

## 2024-05-01 ASSESSMENT — LIFESTYLE VARIABLES
HOW MANY STANDARD DRINKS CONTAINING ALCOHOL DO YOU HAVE ON A TYPICAL DAY: PATIENT DOES NOT DRINK
HOW OFTEN DO YOU HAVE A DRINK CONTAINING ALCOHOL: NEVER

## 2024-05-01 NOTE — PATIENT INSTRUCTIONS
years to screen for glaucoma; cataracts, macular degeneration, and other eye disorders.  A preventive dental visit is recommended every 6 months.  Try to get at least 150 minutes of exercise per week or 10,000 steps per day on a pedometer .  Order or download the FREE \"Exercise & Physical Activity: Your Everyday Guide\" from [employer]. Call 1-877.850.4531 or search [employer] [place] [employer] online.  You need 9283-3186 mg of calcium and 0718-5318 IU of vitamin D per day. It is possible to meet your calcium requirement with diet alone, but a vitamin D supplement is usually necessary to meet this goal.  When exposed to the sun, use a sunscreen that protects against both UVA and UVB radiation with an SPF of 30 or greater. Reapply every 2 to 3 hours or after sweating, drying off with a towel, or swimming.  Always wear a seat belt when traveling in a car. Always wear a helmet when riding a bicycle or motorcycle.

## 2024-05-01 NOTE — PROGRESS NOTES
Medicare Annual Wellness Visit    Ant Carrasquillo is here for Medicare AWV, Diabetes, Hyperlipidemia, and Hypertension    Assessment & Plan   Medicare annual wellness visit, subsequent  Recommendations for Preventive Services Due: see orders and patient instructions/AVS.  Recommended screening schedule for the next 5-10 years is provided to the patient in written form: see Patient Instructions/AVS.     No follow-ups on file.     Subjective       Patient's complete Health Risk Assessment and screening values have been reviewed and are found in Flowsheets. The following problems were reviewed today and where indicated follow up appointments were made and/or referrals ordered.    Positive Risk Factor Screenings with Interventions:                 Dentist Screen:  Have you seen the dentist within the past year?: (!) No (needs to schedule)    Intervention:  Advised to schedule with their dentist     Vision Screen:  Do you have difficulty driving, watching TV, or doing any of your daily activities because of your eyesight?: (!) Yes (fogginess in left eye)  Have you had an eye exam within the past year?: (!) No (needs to schedule)  No results found.    Interventions:   Patient encouraged to make appointment with their eye specialist    Safety:  Do you have non-slip mats or non-slip surfaces or shower bars or grab bars in your shower or bathtub?: (!) No (discussed, pt will address)  Interventions:  See above                   Objective   Vitals:    05/01/24 0929   BP: (!) 112/58   Pulse: 66   SpO2: 98%   Weight: 91.6 kg (202 lb)   Height: 1.778 m (5' 10\")      Body mass index is 28.98 kg/m².               No Known Allergies  Prior to Visit Medications    Medication Sig Taking? Authorizing Provider   folic acid (FOLVITE) 1 MG tablet Take 1 tablet by mouth daily Yes Minnie Coelho MD   gabapentin (NEURONTIN) 800 MG tablet Take 1 tablet by mouth at bedtime. Yes Minnie Coelho MD   hydroxychloroquine (PLAQUENIL) 200 MG 
lb)     BP Readings from Last 3 Encounters:   05/01/24 (!) 112/58   03/25/24 92/60   03/08/24 122/66     Body mass index is 28.98 kg/m².  Constitutional: Patient appears well-developed and well-nourished. No distress.   Head: Normocephalic and atraumatic.   Neck: Normal range of motion. Neck supple. No thyroidmegaly.   Cardiovascular: Normal rate, regular rhythm, normal heart sounds and intact distal pulses.   Pulmonary/Chest: Effort normal and breath sounds normal. No stridor. No respiratory distress. No wheezes and no rales.   Abdominal: Soft. Bowel sounds are normal. No distension and no mass. No tenderness. No rebound and no guarding.   Musculoskeletal: No edema and no tenderness.   Skin: No rash or erythema.  Left elbow with effusion size of a golf ball

## 2024-05-19 DIAGNOSIS — M15.8 OTHER OSTEOARTHRITIS INVOLVING MULTIPLE JOINTS: ICD-10-CM

## 2024-05-20 RX ORDER — DICLOFENAC SODIUM 75 MG/1
TABLET, DELAYED RELEASE ORAL
Qty: 60 TABLET | Refills: 2 | OUTPATIENT
Start: 2024-05-20

## 2024-06-05 DIAGNOSIS — Z79.899 CONTROLLED SUBSTANCE AGREEMENT SIGNED: ICD-10-CM

## 2024-06-05 DIAGNOSIS — G62.9 NEUROPATHY: ICD-10-CM

## 2024-06-05 RX ORDER — GABAPENTIN 800 MG/1
800 TABLET ORAL DAILY
Qty: 90 TABLET | Refills: 1 | OUTPATIENT
Start: 2024-06-05

## 2024-06-16 DIAGNOSIS — M15.8 OTHER OSTEOARTHRITIS INVOLVING MULTIPLE JOINTS: ICD-10-CM

## 2024-06-17 RX ORDER — DICLOFENAC SODIUM 75 MG/1
TABLET, DELAYED RELEASE ORAL
Qty: 60 TABLET | Refills: 1 | Status: SHIPPED | OUTPATIENT
Start: 2024-06-17

## 2024-07-23 ENCOUNTER — OFFICE VISIT (OUTPATIENT)
Dept: INTERNAL MEDICINE CLINIC | Age: 69
End: 2024-07-23
Payer: MEDICARE

## 2024-07-23 VITALS
HEART RATE: 76 BPM | SYSTOLIC BLOOD PRESSURE: 122 MMHG | OXYGEN SATURATION: 98 % | WEIGHT: 203 LBS | DIASTOLIC BLOOD PRESSURE: 74 MMHG | HEIGHT: 70 IN | BODY MASS INDEX: 29.06 KG/M2

## 2024-07-23 DIAGNOSIS — Z12.11 SCREEN FOR COLON CANCER: ICD-10-CM

## 2024-07-23 DIAGNOSIS — E11.69 HYPERLIPIDEMIA ASSOCIATED WITH TYPE 2 DIABETES MELLITUS (HCC): ICD-10-CM

## 2024-07-23 DIAGNOSIS — Z23 NEED FOR STREPTOCOCCUS PNEUMONIAE VACCINATION: ICD-10-CM

## 2024-07-23 DIAGNOSIS — E78.5 HYPERLIPIDEMIA ASSOCIATED WITH TYPE 2 DIABETES MELLITUS (HCC): ICD-10-CM

## 2024-07-23 DIAGNOSIS — E11.43 TYPE 2 DIABETES MELLITUS WITH DIABETIC AUTONOMIC NEUROPATHY, WITHOUT LONG-TERM CURRENT USE OF INSULIN (HCC): Primary | ICD-10-CM

## 2024-07-23 LAB — HBA1C MFR BLD: 6.4 %

## 2024-07-23 PROCEDURE — 83036 HEMOGLOBIN GLYCOSYLATED A1C: CPT | Performed by: INTERNAL MEDICINE

## 2024-07-23 PROCEDURE — G8427 DOCREV CUR MEDS BY ELIG CLIN: HCPCS | Performed by: INTERNAL MEDICINE

## 2024-07-23 PROCEDURE — 3074F SYST BP LT 130 MM HG: CPT | Performed by: INTERNAL MEDICINE

## 2024-07-23 PROCEDURE — 1036F TOBACCO NON-USER: CPT | Performed by: INTERNAL MEDICINE

## 2024-07-23 PROCEDURE — G8417 CALC BMI ABV UP PARAM F/U: HCPCS | Performed by: INTERNAL MEDICINE

## 2024-07-23 PROCEDURE — 90677 PCV20 VACCINE IM: CPT | Performed by: INTERNAL MEDICINE

## 2024-07-23 PROCEDURE — 2022F DILAT RTA XM EVC RTNOPTHY: CPT | Performed by: INTERNAL MEDICINE

## 2024-07-23 PROCEDURE — 1123F ACP DISCUSS/DSCN MKR DOCD: CPT | Performed by: INTERNAL MEDICINE

## 2024-07-23 PROCEDURE — 3017F COLORECTAL CA SCREEN DOC REV: CPT | Performed by: INTERNAL MEDICINE

## 2024-07-23 PROCEDURE — 3078F DIAST BP <80 MM HG: CPT | Performed by: INTERNAL MEDICINE

## 2024-07-23 PROCEDURE — 99213 OFFICE O/P EST LOW 20 MIN: CPT | Performed by: INTERNAL MEDICINE

## 2024-07-23 PROCEDURE — G0009 ADMIN PNEUMOCOCCAL VACCINE: HCPCS | Performed by: INTERNAL MEDICINE

## 2024-07-23 PROCEDURE — 3044F HG A1C LEVEL LT 7.0%: CPT | Performed by: INTERNAL MEDICINE

## 2024-07-23 NOTE — PROGRESS NOTES
Ant Carrasquillo  YOB: 1955    Date of Service:  7/23/2024    Chief Complaint:      Chief Complaint   Patient presents with    Diabetes       Assessment/Plan:  Ant was seen today for diabetes.    Diagnoses and all orders for this visit:    Type 2 diabetes mellitus with diabetic autonomic neuropathy, without long-term current use of insulin (HCC)  -     POCT glycosylated hemoglobin (Hb A1C)  Start empagliflozin (JARDIANCE) 25 MG tablet; Take 1 tablet by mouth daily  Discontinue glipizide    Hyperlipidemia associated with type 2 diabetes mellitus (HCC)  -     Lipid Panel  Addendum 7/24:  decrease crestor 5 mg daily since total cholesterol very low and HDL is also low    Screen for colon cancer  -     [facility]    Need for Streptococcus pneumoniae vaccination  -     Pneumococcal, PCV20, PREVNAR 20, (age 6w+), IM, PF      Return VV Diabetes and CKD 8/15.      HPI:  Ant Carrasquillo is a 68 y.o.    Treatment Adherence:   Medication compliance:  compliant most of the time  Diet compliance:  compliant most of the time  Weight trend: stable  Current exercise: walks 5 time(s) per week  Barriers: none  Diabetes Mellitus Type 2: Current symptoms/problems include Neuropathy on Neurontin 800 mg qhs  Home blood sugar records: fasting range:  AM and 120 PM  Glipizide 10 mg twice a day and Metformin 500 mg 2 bid  Any episodes of hypoglycemia? yes   Eye exam current (within one year): no  Tobacco history: He  reports that he has never smoked. He has never used smokeless tobacco.   Daily Aspirin? Yes  Known diabetic complications: autonomic neuropathy  Hypertension:  Home blood pressure monitoring: Yes - on Vasotec 10 mg bid.  He is adherent to a low sodium diet. Patient denies chest pain, shortness of breath, headache, lightheadedness, blurred vision, peripheral edema, palpitations, dry cough, and fatigue.  Antihypertensive medication side effects: no

## 2024-07-24 LAB
CHOLEST SERPL-MCNC: 90 MG/DL (ref 0–199)
HDLC SERPL-MCNC: 29 MG/DL (ref 40–60)
LDLC SERPL CALC-MCNC: 45 MG/DL
TRIGL SERPL-MCNC: 82 MG/DL (ref 0–150)
VLDLC SERPL CALC-MCNC: 16 MG/DL

## 2024-07-24 RX ORDER — ROSUVASTATIN CALCIUM 5 MG/1
5 TABLET, COATED ORAL NIGHTLY
Qty: 90 TABLET | Refills: 2 | Status: SHIPPED | OUTPATIENT
Start: 2024-07-24

## 2024-08-15 ENCOUNTER — TELEMEDICINE (OUTPATIENT)
Dept: INTERNAL MEDICINE CLINIC | Age: 69
End: 2024-08-15

## 2024-08-15 DIAGNOSIS — E11.69 HYPERLIPIDEMIA ASSOCIATED WITH TYPE 2 DIABETES MELLITUS (HCC): ICD-10-CM

## 2024-08-15 DIAGNOSIS — E78.5 HYPERLIPIDEMIA ASSOCIATED WITH TYPE 2 DIABETES MELLITUS (HCC): ICD-10-CM

## 2024-08-15 DIAGNOSIS — E11.43 TYPE 2 DIABETES MELLITUS WITH DIABETIC AUTONOMIC NEUROPATHY, WITHOUT LONG-TERM CURRENT USE OF INSULIN (HCC): Primary | ICD-10-CM

## 2024-08-15 DIAGNOSIS — G62.9 NEUROPATHY: ICD-10-CM

## 2024-08-15 PROBLEM — I10 ESSENTIAL HYPERTENSION: Status: RESOLVED | Noted: 2023-05-01 | Resolved: 2024-08-15

## 2024-08-15 NOTE — PROGRESS NOTES
Patient was evaluated through a synchronous (real-time) video encounter. Patient identification was verified at the start of the visit. She (or guardian if applicable) is aware that this is a billable service, which includes applicable co-pays. This visit was conducted with the patient's (and/or legal guardian's) verbal consent. She has not had a related appointment within my department in the past 7 days or scheduled within the next 24 hours.   The patient was located at Home: [address] [place] [address].  The provider was located at Home (Appt Dept State): [place].    Date of Service:  8/15/2024    Chief Complaint:      Chief Complaint   Patient presents with    Diabetes    Chronic Kidney Disease       Assessment/Plan:    Ant was seen today for diabetes and chronic kidney disease.    Diagnoses and all orders for this visit:    Type 2 diabetes mellitus with diabetic autonomic neuropathy, without long-term current use of insulin (HCC)  Increase glipizide 10 mg 1/2 AM and 1 PM    Hyperlipidemia associated with type 2 diabetes mellitus (HCC)  Decrease crestor 5 mg daily      Return Diabetes, BP, cholesterol 11/5.      HPI:  Ant Carrasquillo is a 68 y.o.      Treatment Adherence:   Medication compliance:  compliant most of the time  Diet compliance:  compliant most of the time  Weight trend: stable  Current exercise: walks 5 time(s) per week  Barriers: none  Diabetes Mellitus Type 2: Current symptoms/problems include Neuropathy on Neurontin 800 mg qhs  Home blood sugar records: fasting range:  AM and 180-200 PM  Glipizide 10 mg with dinner and Metformin 500 mg 2 twice a day.  Jardiance was too expensive.  Any episodes of hypoglycemia? yes   Eye exam current (within one year): no  Tobacco history: He  reports that he has never smoked. He has never used smokeless tobacco.   Daily Aspirin? Yes  Known diabetic complications: autonomic neuropathy  Hypertension: resolve. Home blood pressure monitoring: Yes -

## 2024-08-22 DIAGNOSIS — M15.8 OTHER OSTEOARTHRITIS INVOLVING MULTIPLE JOINTS: ICD-10-CM

## 2024-08-26 RX ORDER — DICLOFENAC SODIUM 75 MG/1
TABLET, DELAYED RELEASE ORAL
Qty: 60 TABLET | Refills: 2 | Status: SHIPPED | OUTPATIENT
Start: 2024-08-26

## 2024-11-05 ENCOUNTER — OFFICE VISIT (OUTPATIENT)
Dept: INTERNAL MEDICINE CLINIC | Age: 69
End: 2024-11-05

## 2024-11-05 VITALS
TEMPERATURE: 74 F | WEIGHT: 202 LBS | BODY MASS INDEX: 28.92 KG/M2 | DIASTOLIC BLOOD PRESSURE: 78 MMHG | OXYGEN SATURATION: 88 % | HEIGHT: 70 IN | SYSTOLIC BLOOD PRESSURE: 140 MMHG

## 2024-11-05 DIAGNOSIS — E11.43 TYPE 2 DIABETES MELLITUS WITH DIABETIC AUTONOMIC NEUROPATHY, WITHOUT LONG-TERM CURRENT USE OF INSULIN (HCC): Primary | ICD-10-CM

## 2024-11-05 DIAGNOSIS — E11.69 HYPERLIPIDEMIA ASSOCIATED WITH TYPE 2 DIABETES MELLITUS (HCC): ICD-10-CM

## 2024-11-05 DIAGNOSIS — I10 ESSENTIAL HYPERTENSION: ICD-10-CM

## 2024-11-05 DIAGNOSIS — Z23 NEED FOR IMMUNIZATION AGAINST INFLUENZA: ICD-10-CM

## 2024-11-05 DIAGNOSIS — E78.5 HYPERLIPIDEMIA ASSOCIATED WITH TYPE 2 DIABETES MELLITUS (HCC): ICD-10-CM

## 2024-11-05 LAB — HBA1C MFR BLD: 5.9 %

## 2024-11-05 RX ORDER — LOSARTAN POTASSIUM 25 MG/1
25 TABLET ORAL DAILY
Qty: 30 TABLET | Refills: 0 | Status: SHIPPED | OUTPATIENT
Start: 2024-11-05

## 2024-11-05 NOTE — PROGRESS NOTES
Ant Carrasquillo  YOB: 1955    Date of Service:  11/5/2024    Chief Complaint:      Chief Complaint   Patient presents with    Hypertension    Diabetes    Hyperlipidemia     fasting       Assessment/Plan:  Ant was seen today for hypertension, diabetes and hyperlipidemia.    Diagnoses and all orders for this visit:    Type 2 diabetes mellitus with diabetic autonomic neuropathy, without long-term current use of insulin (HCC)  -     POCT glycosylated hemoglobin (Hb A1C)    Essential hypertension  Start  losartan (COZAAR) 25 MG tablet; Take 1 tablet by mouth daily    Hyperlipidemia associated with type 2 diabetes mellitus (HCC)  Stable and continue on current treatment    Need for immunization against influenza  -     Influenza, FLUAD Trivalent, (age 65 y+), IM, Preservative Free, 0.5mL      Return Diabetes and BP 12/2.      HPI:  Ant Carrasquillo is a 69 y.o.    Treatment Adherence:   Medication compliance:  compliant most of the time  Diet compliance:  compliant most of the time  Weight trend: stable  Current exercise: walks 5 time(s) per week  Barriers: none  Diabetes Mellitus Type 2: Current symptoms/problems include Neuropathy on Neurontin 800 mg qhs  Home blood sugar records: fasting range:  AM and 180-300 PM  Glipizide 10 mg with dinner and Metformin 500 mg 2 twice a day.  Jardiance was too expensive.  Any episodes of hypoglycemia? yes   Eye exam current (within one year): no  Tobacco history: He  reports that he has never smoked. He has never used smokeless tobacco.   Daily Aspirin? Yes  Known diabetic complications: autonomic neuropathy  Hypertension: resolve. Home blood pressure monitoring: Yes - 130/70-80 off Vasotec 10 mg bid.  He is adherent to a low sodium diet. Patient denies chest pain, shortness of breath, headache, lightheadedness, blurred vision, peripheral edema, palpitations, dry cough, and fatigue.  Antihypertensive medication side effects: no medication side effects

## 2024-12-02 ENCOUNTER — OFFICE VISIT (OUTPATIENT)
Dept: INTERNAL MEDICINE CLINIC | Age: 69
End: 2024-12-02

## 2024-12-02 VITALS
DIASTOLIC BLOOD PRESSURE: 68 MMHG | OXYGEN SATURATION: 99 % | BODY MASS INDEX: 29.92 KG/M2 | HEIGHT: 69 IN | HEART RATE: 56 BPM | WEIGHT: 202 LBS | SYSTOLIC BLOOD PRESSURE: 130 MMHG

## 2024-12-02 DIAGNOSIS — I10 ESSENTIAL HYPERTENSION: ICD-10-CM

## 2024-12-02 DIAGNOSIS — G62.9 NEUROPATHY: ICD-10-CM

## 2024-12-02 DIAGNOSIS — Z79.899 CONTROLLED SUBSTANCE AGREEMENT SIGNED: ICD-10-CM

## 2024-12-02 DIAGNOSIS — E11.43 TYPE 2 DIABETES MELLITUS WITH DIABETIC AUTONOMIC NEUROPATHY, WITHOUT LONG-TERM CURRENT USE OF INSULIN (HCC): Primary | ICD-10-CM

## 2024-12-02 LAB — HBA1C MFR BLD: 5.8 %

## 2024-12-02 RX ORDER — GLIPIZIDE 5 MG/1
5 TABLET ORAL
Qty: 180 TABLET | Refills: 1 | Status: SHIPPED | OUTPATIENT
Start: 2024-12-02

## 2024-12-02 RX ORDER — LOSARTAN POTASSIUM 25 MG/1
25 TABLET ORAL DAILY
Qty: 90 TABLET | Refills: 1 | Status: SHIPPED | OUTPATIENT
Start: 2024-12-02

## 2024-12-02 RX ORDER — VIT C/B6/B5/MAGNESIUM/HERB 173 50-5-6-5MG
500 CAPSULE ORAL DAILY
COMMUNITY

## 2024-12-02 NOTE — PROGRESS NOTES
Marked as Taking for the 12/2/24 encounter (Office Visit) with LaquitaMinnie hills MD   Medication Sig Dispense Refill    turmeric 500 MG CAPS Take 1 capsule by mouth daily      Boswellia-Glucosamine-Vit D (OSTEO BI-FLEX ONE PER DAY PO) Take by mouth      losartan (COZAAR) 25 MG tablet Take 1 tablet by mouth daily 30 tablet 0    diclofenac (VOLTAREN) 75 MG EC tablet TAKE 1 TABLET BY MOUTH TWICE A DAY WITH A MEAL 60 tablet 2    rosuvastatin (CRESTOR) 5 MG tablet Take 1 tablet by mouth nightly 90 tablet 2    gabapentin (NEURONTIN) 800 MG tablet Take 1 tablet by mouth at bedtime for 180 days. 90 tablet 1    glipiZIDE (GLUCOTROL) 10 MG tablet Take 1 tablet by mouth 2 times daily 180 tablet 3    metFORMIN (GLUCOPHAGE) 500 MG tablet Take 2 tablets by mouth 2 times daily (with meals) 360 tablet 3    folic acid (FOLVITE) 1 MG tablet Take 1 tablet by mouth daily 30 tablet 0    hydroxychloroquine (PLAQUENIL) 200 MG tablet       metoprolol succinate (TOPROL XL) 25 MG extended release tablet Take 1 tablet by mouth daily 90 tablet 3    ferrous sulfate (IRON 325) 325 (65 Fe) MG tablet Take 1 tablet by mouth every 48 hours 30 tablet 3    aspirin 325 MG tablet Take 1 tablet by mouth daily      Cyanocobalamin (VITAMIN B 12 PO) Take by mouth           Review of Systems: 14 systems were negative except of what was stated on HPI    Nursing note and vitals reviewed.    Vitals:    12/02/24 1314   BP: 130/68   Site: Left Upper Arm   Position: Sitting   Cuff Size: Medium Adult   Pulse: 56   SpO2: 99%   Weight: 91.6 kg (202 lb)   Height: 1.753 m (5' 9\")     Wt Readings from Last 3 Encounters:   12/02/24 91.6 kg (202 lb)   11/05/24 91.6 kg (202 lb)   07/23/24 92.1 kg (203 lb)     BP Readings from Last 3 Encounters:   12/02/24 130/68   11/05/24 (!) 140/78   07/23/24 122/74     Body mass index is 29.83 kg/m².  Constitutional: Patient appears well-developed and well-nourished. No distress.   Head: Normocephalic and atraumatic.   Neck: Normal range

## 2024-12-05 DIAGNOSIS — Z79.899 CONTROLLED SUBSTANCE AGREEMENT SIGNED: ICD-10-CM

## 2024-12-05 DIAGNOSIS — G62.9 NEUROPATHY: ICD-10-CM

## 2024-12-05 RX ORDER — GABAPENTIN 800 MG/1
800 TABLET ORAL NIGHTLY
Qty: 90 TABLET | Refills: 1 | Status: SHIPPED | OUTPATIENT
Start: 2024-12-05 | End: 2025-06-03

## 2024-12-05 RX ORDER — GABAPENTIN 800 MG/1
800 TABLET ORAL NIGHTLY
Qty: 90 TABLET | Refills: 1 | OUTPATIENT
Start: 2024-12-05

## 2024-12-30 DIAGNOSIS — M15.8 OTHER OSTEOARTHRITIS INVOLVING MULTIPLE JOINTS: ICD-10-CM

## 2024-12-30 RX ORDER — DICLOFENAC SODIUM 75 MG/1
75 TABLET, DELAYED RELEASE ORAL 2 TIMES DAILY
Qty: 180 TABLET | Refills: 1 | Status: SHIPPED | OUTPATIENT
Start: 2024-12-30

## 2024-12-30 NOTE — TELEPHONE ENCOUNTER
LAST REFILL 08/26/24  AMOUNT 60     2 REFILLS  LAST VISIT 12/2/2024  NEXT VISIT   Future Appointments   Date Time Provider Department Center   5/5/2025 10:10 AM [facility]

## 2025-01-27 ENCOUNTER — OFFICE VISIT (OUTPATIENT)
Dept: INTERNAL MEDICINE CLINIC | Age: 70
End: 2025-01-27
Payer: MEDICARE

## 2025-01-27 VITALS
DIASTOLIC BLOOD PRESSURE: 78 MMHG | HEIGHT: 70 IN | SYSTOLIC BLOOD PRESSURE: 138 MMHG | OXYGEN SATURATION: 96 % | HEART RATE: 82 BPM | WEIGHT: 208 LBS | BODY MASS INDEX: 29.78 KG/M2

## 2025-01-27 DIAGNOSIS — E11.43 TYPE 2 DIABETES MELLITUS WITH DIABETIC AUTONOMIC NEUROPATHY, WITHOUT LONG-TERM CURRENT USE OF INSULIN (HCC): Primary | ICD-10-CM

## 2025-01-27 PROCEDURE — 1159F MED LIST DOCD IN RCRD: CPT | Performed by: INTERNAL MEDICINE

## 2025-01-27 PROCEDURE — 3046F HEMOGLOBIN A1C LEVEL >9.0%: CPT | Performed by: INTERNAL MEDICINE

## 2025-01-27 PROCEDURE — 1123F ACP DISCUSS/DSCN MKR DOCD: CPT | Performed by: INTERNAL MEDICINE

## 2025-01-27 PROCEDURE — G8417 CALC BMI ABV UP PARAM F/U: HCPCS | Performed by: INTERNAL MEDICINE

## 2025-01-27 PROCEDURE — 2022F DILAT RTA XM EVC RTNOPTHY: CPT | Performed by: INTERNAL MEDICINE

## 2025-01-27 PROCEDURE — 99213 OFFICE O/P EST LOW 20 MIN: CPT | Performed by: INTERNAL MEDICINE

## 2025-01-27 PROCEDURE — 1036F TOBACCO NON-USER: CPT | Performed by: INTERNAL MEDICINE

## 2025-01-27 PROCEDURE — 3017F COLORECTAL CA SCREEN DOC REV: CPT | Performed by: INTERNAL MEDICINE

## 2025-01-27 PROCEDURE — 3075F SYST BP GE 130 - 139MM HG: CPT | Performed by: INTERNAL MEDICINE

## 2025-01-27 PROCEDURE — 1160F RVW MEDS BY RX/DR IN RCRD: CPT | Performed by: INTERNAL MEDICINE

## 2025-01-27 PROCEDURE — G8427 DOCREV CUR MEDS BY ELIG CLIN: HCPCS | Performed by: INTERNAL MEDICINE

## 2025-01-27 PROCEDURE — 3078F DIAST BP <80 MM HG: CPT | Performed by: INTERNAL MEDICINE

## 2025-01-27 SDOH — ECONOMIC STABILITY: FOOD INSECURITY: WITHIN THE PAST 12 MONTHS, YOU WORRIED THAT YOUR FOOD WOULD RUN OUT BEFORE YOU GOT MONEY TO BUY MORE.: NEVER TRUE

## 2025-01-27 SDOH — ECONOMIC STABILITY: FOOD INSECURITY: WITHIN THE PAST 12 MONTHS, THE FOOD YOU BOUGHT JUST DIDN'T LAST AND YOU DIDN'T HAVE MONEY TO GET MORE.: NEVER TRUE

## 2025-01-27 ASSESSMENT — PATIENT HEALTH QUESTIONNAIRE - PHQ9
1. LITTLE INTEREST OR PLEASURE IN DOING THINGS: NOT AT ALL
SUM OF ALL RESPONSES TO PHQ QUESTIONS 1-9: 0
SUM OF ALL RESPONSES TO PHQ9 QUESTIONS 1 & 2: 0
2. FEELING DOWN, DEPRESSED OR HOPELESS: NOT AT ALL
SUM OF ALL RESPONSES TO PHQ QUESTIONS 1-9: 0

## 2025-01-27 NOTE — PROGRESS NOTES
Ant Carrasquillo  YOB: 1955    Date of Service:  1/27/2025    Chief Complaint:      Chief Complaint   Patient presents with    Blood Sugar Problem     Low blood sugar 108        Assessment/Plan:  Ant was seen today for blood sugar problem.    Diagnoses and all orders for this visit:    Type 2 diabetes mellitus with diabetic autonomic neuropathy, without long-term current use of insulin (HCC)    Stable and continue on current medications.    No follow-ups on file.      HPI:  Ant Carrasquillo is a 69 y.o.       Treatment Adherence:   Medication compliance:  compliant most of the time  Diet compliance:  compliant most of the time  Weight trend: stable  Current exercise: walks 5 time(s) per week  Barriers: none  Diabetes Mellitus Type 2: Current symptoms/problems include Neuropathy on Neurontin 800 mg qhs  Home blood sugar records: fasting range: 40-70 AM and 160 PM  Glipizide 5 mg 1/2 twice a day and Metformin 500 mg 2 twice a day.  Jardiance was too expensive.  Any episodes of hypoglycemia? yes   Eye exam current (within one year): no  Tobacco history: He  reports that he has never smoked. He has never used smokeless tobacco.   Daily Aspirin? Yes  Known diabetic complications: autonomic neuropathy  Hypertension: resolve. Home blood pressure monitoring: Yes - 130/70-80 off Vasotec 10 mg bid.  He is adherent to a low sodium diet. Patient denies chest pain, shortness of breath, headache, lightheadedness, blurred vision, peripheral edema, palpitations, dry cough, and fatigue.  Antihypertensive medication side effects: no medication side effects noted.  Use of agents associated with hypertension: none.   Hyperlipidemia:  No new myalgias or GI upset on rosuvastatin (Crestor) 5 mg qd.     RA:  stable on Plaquenil and Voltaren 75 mg twice a day, Folic acid per rheumatologist.  H/O nonpainful left elbow effusion for about 10 years.  He does rub his elbow from working out.    Lab Results   Component Value

## 2025-02-14 ENCOUNTER — APPOINTMENT (OUTPATIENT)
Dept: CT IMAGING | Age: 70
DRG: 683 | End: 2025-02-14
Payer: MEDICARE

## 2025-02-14 ENCOUNTER — HOSPITAL ENCOUNTER (INPATIENT)
Age: 70
LOS: 6 days | Discharge: HOME | DRG: 683 | End: 2025-02-20
Attending: EMERGENCY MEDICINE | Admitting: HOSPITALIST
Payer: MEDICARE

## 2025-02-14 DIAGNOSIS — R19.7 NAUSEA VOMITING AND DIARRHEA: Primary | ICD-10-CM

## 2025-02-14 DIAGNOSIS — R11.2 NAUSEA VOMITING AND DIARRHEA: Primary | ICD-10-CM

## 2025-02-14 DIAGNOSIS — I42.9 CARDIOMYOPATHY, UNSPECIFIED TYPE (HCC): ICD-10-CM

## 2025-02-14 DIAGNOSIS — E87.5 HYPERKALEMIA: ICD-10-CM

## 2025-02-14 DIAGNOSIS — N19 UREMIA: ICD-10-CM

## 2025-02-14 DIAGNOSIS — I50.42 CHRONIC COMBINED SYSTOLIC AND DIASTOLIC CONGESTIVE HEART FAILURE (HCC): ICD-10-CM

## 2025-02-14 DIAGNOSIS — I48.0 PAF (PAROXYSMAL ATRIAL FIBRILLATION) (HCC): ICD-10-CM

## 2025-02-14 DIAGNOSIS — N17.9 ACUTE RENAL FAILURE, UNSPECIFIED ACUTE RENAL FAILURE TYPE: ICD-10-CM

## 2025-02-14 LAB
ALBUMIN SERPL-MCNC: 3.6 G/DL (ref 3.4–5)
ALBUMIN/GLOB SERPL: 1.2 {RATIO} (ref 1.1–2.2)
ALP SERPL-CCNC: 97 U/L (ref 40–129)
ALT SERPL-CCNC: 24 U/L (ref 10–40)
ANION GAP SERPL CALCULATED.3IONS-SCNC: 20 MMOL/L (ref 3–16)
AST SERPL-CCNC: 27 U/L (ref 15–37)
BASOPHILS # BLD: 0 K/UL (ref 0–0.2)
BASOPHILS NFR BLD: 0.3 %
BILIRUB SERPL-MCNC: <0.2 MG/DL (ref 0–1)
BUN SERPL-MCNC: 103 MG/DL (ref 7–20)
CALCIUM SERPL-MCNC: 8.9 MG/DL (ref 8.3–10.6)
CHLORIDE SERPL-SCNC: 103 MMOL/L (ref 99–110)
CO2 SERPL-SCNC: 9 MMOL/L (ref 21–32)
CREAT SERPL-MCNC: 8.7 MG/DL (ref 0.8–1.3)
DEPRECATED RDW RBC AUTO: 14.3 % (ref 12.4–15.4)
EOSINOPHIL # BLD: 0 K/UL (ref 0–0.6)
EOSINOPHIL NFR BLD: 0.4 %
FLUAV RNA RESP QL NAA+PROBE: NOT DETECTED
FLUBV RNA RESP QL NAA+PROBE: NOT DETECTED
GFR SERPLBLD CREATININE-BSD FMLA CKD-EPI: 6 ML/MIN/{1.73_M2}
GLUCOSE BLD-MCNC: 129 MG/DL (ref 70–99)
GLUCOSE BLD-MCNC: 142 MG/DL (ref 70–99)
GLUCOSE BLD-MCNC: 153 MG/DL (ref 70–99)
GLUCOSE SERPL-MCNC: 160 MG/DL (ref 70–99)
HCT VFR BLD AUTO: 24.6 % (ref 40.5–52.5)
HGB BLD-MCNC: 8 G/DL (ref 13.5–17.5)
LACTATE BLDV-SCNC: 2.9 MMOL/L (ref 0.4–1.9)
LACTATE BLDV-SCNC: 3 MMOL/L (ref 0.4–1.9)
LYMPHOCYTES # BLD: 0.6 K/UL (ref 1–5.1)
LYMPHOCYTES NFR BLD: 7 %
MCH RBC QN AUTO: 31.4 PG (ref 26–34)
MCHC RBC AUTO-ENTMCNC: 32.7 G/DL (ref 31–36)
MCV RBC AUTO: 96.1 FL (ref 80–100)
MONOCYTES # BLD: 0.4 K/UL (ref 0–1.3)
MONOCYTES NFR BLD: 4.9 %
NEUTROPHILS # BLD: 6.9 K/UL (ref 1.7–7.7)
NEUTROPHILS NFR BLD: 87.4 %
NT-PROBNP SERPL-MCNC: ABNORMAL PG/ML (ref 0–124)
PERFORMED ON: ABNORMAL
PLATELET # BLD AUTO: 314 K/UL (ref 135–450)
PMV BLD AUTO: 7.8 FL (ref 5–10.5)
POTASSIUM SERPL-SCNC: 7.3 MMOL/L (ref 3.5–5.1)
PROT SERPL-MCNC: 6.5 G/DL (ref 6.4–8.2)
RBC # BLD AUTO: 2.56 M/UL (ref 4.2–5.9)
SARS-COV-2 RNA RESP QL NAA+PROBE: NOT DETECTED
SODIUM SERPL-SCNC: 132 MMOL/L (ref 136–145)
TROPONIN, HIGH SENSITIVITY: 725 NG/L (ref 0–22)
TROPONIN, HIGH SENSITIVITY: 787 NG/L (ref 0–22)
WBC # BLD AUTO: 7.9 K/UL (ref 4–11)

## 2025-02-14 PROCEDURE — 93005 ELECTROCARDIOGRAM TRACING: CPT | Performed by: EMERGENCY MEDICINE

## 2025-02-14 PROCEDURE — 82010 KETONE BODYS QUAN: CPT

## 2025-02-14 PROCEDURE — 99285 EMERGENCY DEPT VISIT HI MDM: CPT

## 2025-02-14 PROCEDURE — 74176 CT ABD & PELVIS W/O CONTRAST: CPT

## 2025-02-14 PROCEDURE — 84484 ASSAY OF TROPONIN QUANT: CPT

## 2025-02-14 PROCEDURE — 80053 COMPREHEN METABOLIC PANEL: CPT

## 2025-02-14 PROCEDURE — 6370000000 HC RX 637 (ALT 250 FOR IP): Performed by: EMERGENCY MEDICINE

## 2025-02-14 PROCEDURE — 96374 THER/PROPH/DIAG INJ IV PUSH: CPT

## 2025-02-14 PROCEDURE — 36415 COLL VENOUS BLD VENIPUNCTURE: CPT

## 2025-02-14 PROCEDURE — 96375 TX/PRO/DX INJ NEW DRUG ADDON: CPT

## 2025-02-14 PROCEDURE — 1200000000 HC SEMI PRIVATE

## 2025-02-14 PROCEDURE — 6360000002 HC RX W HCPCS: Performed by: EMERGENCY MEDICINE

## 2025-02-14 PROCEDURE — 83605 ASSAY OF LACTIC ACID: CPT

## 2025-02-14 PROCEDURE — 2500000003 HC RX 250 WO HCPCS: Performed by: EMERGENCY MEDICINE

## 2025-02-14 PROCEDURE — 94640 AIRWAY INHALATION TREATMENT: CPT

## 2025-02-14 PROCEDURE — 2580000003 HC RX 258: Performed by: EMERGENCY MEDICINE

## 2025-02-14 PROCEDURE — 85025 COMPLETE CBC W/AUTO DIFF WBC: CPT

## 2025-02-14 PROCEDURE — 2500000003 HC RX 250 WO HCPCS: Performed by: HOSPITALIST

## 2025-02-14 PROCEDURE — 2500000003 HC RX 250 WO HCPCS: Performed by: INTERNAL MEDICINE

## 2025-02-14 PROCEDURE — 6370000000 HC RX 637 (ALT 250 FOR IP): Performed by: INTERNAL MEDICINE

## 2025-02-14 PROCEDURE — 6360000002 HC RX W HCPCS: Performed by: HOSPITALIST

## 2025-02-14 PROCEDURE — 2060000000 HC ICU INTERMEDIATE R&B

## 2025-02-14 PROCEDURE — 87636 SARSCOV2 & INF A&B AMP PRB: CPT

## 2025-02-14 PROCEDURE — 83880 ASSAY OF NATRIURETIC PEPTIDE: CPT

## 2025-02-14 RX ORDER — SODIUM CHLORIDE 0.9 % (FLUSH) 0.9 %
5-40 SYRINGE (ML) INJECTION EVERY 12 HOURS SCHEDULED
Status: DISCONTINUED | OUTPATIENT
Start: 2025-02-14 | End: 2025-02-20 | Stop reason: HOSPADM

## 2025-02-14 RX ORDER — FLUDROCORTISONE ACETATE 0.1 MG/1
0.2 TABLET ORAL ONCE
Status: COMPLETED | OUTPATIENT
Start: 2025-02-14 | End: 2025-02-14

## 2025-02-14 RX ORDER — 0.9 % SODIUM CHLORIDE 0.9 %
1000 INTRAVENOUS SOLUTION INTRAVENOUS ONCE
Status: COMPLETED | OUTPATIENT
Start: 2025-02-14 | End: 2025-02-14

## 2025-02-14 RX ORDER — ONDANSETRON 2 MG/ML
4 INJECTION INTRAMUSCULAR; INTRAVENOUS
Status: DISCONTINUED | OUTPATIENT
Start: 2025-02-14 | End: 2025-02-18 | Stop reason: HOSPADM

## 2025-02-14 RX ORDER — POLYETHYLENE GLYCOL 3350 17 G/17G
17 POWDER, FOR SOLUTION ORAL DAILY PRN
Status: DISCONTINUED | OUTPATIENT
Start: 2025-02-14 | End: 2025-02-20 | Stop reason: HOSPADM

## 2025-02-14 RX ORDER — SODIUM CHLORIDE 0.9 % (FLUSH) 0.9 %
5-40 SYRINGE (ML) INJECTION PRN
Status: DISCONTINUED | OUTPATIENT
Start: 2025-02-14 | End: 2025-02-20 | Stop reason: HOSPADM

## 2025-02-14 RX ORDER — LIDOCAINE HYDROCHLORIDE 20 MG/ML
JELLY TOPICAL PRN
Status: COMPLETED | OUTPATIENT
Start: 2025-02-14 | End: 2025-02-14

## 2025-02-14 RX ORDER — ALBUTEROL SULFATE 5 MG/ML
7.5 SOLUTION RESPIRATORY (INHALATION) ONCE
Status: COMPLETED | OUTPATIENT
Start: 2025-02-14 | End: 2025-02-14

## 2025-02-14 RX ORDER — CALCIUM GLUCONATE 94 MG/ML
1000 INJECTION, SOLUTION INTRAVENOUS ONCE
Status: COMPLETED | OUTPATIENT
Start: 2025-02-14 | End: 2025-02-14

## 2025-02-14 RX ORDER — DEXTROSE MONOHYDRATE 100 MG/ML
INJECTION, SOLUTION INTRAVENOUS CONTINUOUS
Status: DISPENSED | OUTPATIENT
Start: 2025-02-14 | End: 2025-02-14

## 2025-02-14 RX ORDER — ONDANSETRON 2 MG/ML
4 INJECTION INTRAMUSCULAR; INTRAVENOUS EVERY 6 HOURS PRN
Status: DISCONTINUED | OUTPATIENT
Start: 2025-02-14 | End: 2025-02-20 | Stop reason: HOSPADM

## 2025-02-14 RX ORDER — HEPARIN SODIUM 5000 [USP'U]/ML
5000 INJECTION, SOLUTION INTRAVENOUS; SUBCUTANEOUS EVERY 8 HOURS SCHEDULED
Status: DISCONTINUED | OUTPATIENT
Start: 2025-02-14 | End: 2025-02-20 | Stop reason: HOSPADM

## 2025-02-14 RX ORDER — SODIUM CHLORIDE 9 MG/ML
INJECTION, SOLUTION INTRAVENOUS PRN
Status: DISCONTINUED | OUTPATIENT
Start: 2025-02-14 | End: 2025-02-20 | Stop reason: HOSPADM

## 2025-02-14 RX ORDER — ACETAMINOPHEN 650 MG/1
650 SUPPOSITORY RECTAL EVERY 6 HOURS PRN
Status: DISCONTINUED | OUTPATIENT
Start: 2025-02-14 | End: 2025-02-20 | Stop reason: HOSPADM

## 2025-02-14 RX ORDER — ACETAMINOPHEN 325 MG/1
650 TABLET ORAL EVERY 6 HOURS PRN
Status: DISCONTINUED | OUTPATIENT
Start: 2025-02-14 | End: 2025-02-20 | Stop reason: HOSPADM

## 2025-02-14 RX ORDER — ONDANSETRON 4 MG/1
4 TABLET, ORALLY DISINTEGRATING ORAL EVERY 8 HOURS PRN
Status: DISCONTINUED | OUTPATIENT
Start: 2025-02-14 | End: 2025-02-20 | Stop reason: HOSPADM

## 2025-02-14 RX ADMIN — LIDOCAINE HYDROCHLORIDE: 20 JELLY TOPICAL at 23:21

## 2025-02-14 RX ADMIN — SODIUM CHLORIDE 1000 ML: 9 INJECTION, SOLUTION INTRAVENOUS at 20:30

## 2025-02-14 RX ADMIN — CALCIUM GLUCONATE 1000 MG: 98 INJECTION, SOLUTION INTRAVENOUS at 21:44

## 2025-02-14 RX ADMIN — DEXTROSE MONOHYDRATE: 100 INJECTION, SOLUTION INTRAVENOUS at 21:50

## 2025-02-14 RX ADMIN — HEPARIN SODIUM 5000 UNITS: 5000 INJECTION INTRAVENOUS; SUBCUTANEOUS at 23:28

## 2025-02-14 RX ADMIN — ALBUTEROL SULFATE 7.5 MG: 2.5 SOLUTION RESPIRATORY (INHALATION) at 21:42

## 2025-02-14 RX ADMIN — SODIUM BICARBONATE 50 MEQ: 84 INJECTION INTRAVENOUS at 21:46

## 2025-02-14 RX ADMIN — SODIUM CHLORIDE, PRESERVATIVE FREE 5 ML: 5 INJECTION INTRAVENOUS at 23:20

## 2025-02-14 RX ADMIN — SODIUM BICARBONATE: 84 INJECTION, SOLUTION INTRAVENOUS at 23:18

## 2025-02-14 RX ADMIN — FLUDROCORTISONE ACETATE 0.2 MG: 0.1 TABLET ORAL at 23:19

## 2025-02-14 RX ADMIN — SODIUM BICARBONATE 100 MEQ: 84 INJECTION INTRAVENOUS at 23:16

## 2025-02-14 RX ADMIN — SODIUM ZIRCONIUM CYCLOSILICATE 10 G: 10 POWDER, FOR SUSPENSION ORAL at 21:43

## 2025-02-14 RX ADMIN — INSULIN HUMAN 10 UNITS: 100 INJECTION, SOLUTION PARENTERAL at 21:52

## 2025-02-15 PROBLEM — N18.9 ANEMIA OF RENAL DISEASE: Status: ACTIVE | Noted: 2025-02-15

## 2025-02-15 PROBLEM — N17.9 ACUTE RENAL FAILURE (ARF): Status: ACTIVE | Noted: 2025-02-15

## 2025-02-15 PROBLEM — D63.1 ANEMIA OF RENAL DISEASE: Status: ACTIVE | Noted: 2025-02-15

## 2025-02-15 LAB
ABO + RH BLD: NORMAL
ALBUMIN SERPL-MCNC: 3.2 G/DL (ref 3.4–5)
ALBUMIN/GLOB SERPL: 1.3 {RATIO} (ref 1.1–2.2)
ALP SERPL-CCNC: 81 U/L (ref 40–129)
ALT SERPL-CCNC: 22 U/L (ref 10–40)
ANION GAP SERPL CALCULATED.3IONS-SCNC: 15 MMOL/L (ref 3–16)
ANION GAP SERPL CALCULATED.3IONS-SCNC: 15 MMOL/L (ref 3–16)
ANION GAP SERPL CALCULATED.3IONS-SCNC: 17 MMOL/L (ref 3–16)
ANION GAP SERPL CALCULATED.3IONS-SCNC: 18 MMOL/L (ref 3–16)
AST SERPL-CCNC: 15 U/L (ref 15–37)
BASOPHILS # BLD: 0 K/UL (ref 0–0.2)
BASOPHILS NFR BLD: 0.3 %
BETA-HYDROXYBUTYRATE: 1.24 MMOL/L (ref 0–0.27)
BILIRUB SERPL-MCNC: <0.2 MG/DL (ref 0–1)
BILIRUB UR QL STRIP.AUTO: NEGATIVE
BLD GP AB SCN SERPL QL: NORMAL
BLOOD BANK DISPENSE STATUS: NORMAL
BLOOD BANK PRODUCT CODE: NORMAL
BPU ID: NORMAL
BUN SERPL-MCNC: 100 MG/DL (ref 7–20)
BUN SERPL-MCNC: 94 MG/DL (ref 7–20)
BUN SERPL-MCNC: 95 MG/DL (ref 7–20)
BUN SERPL-MCNC: 98 MG/DL (ref 7–20)
CALCIUM SERPL-MCNC: 7.8 MG/DL (ref 8.3–10.6)
CALCIUM SERPL-MCNC: 8.1 MG/DL (ref 8.3–10.6)
CALCIUM SERPL-MCNC: 8.1 MG/DL (ref 8.3–10.6)
CALCIUM SERPL-MCNC: 8.4 MG/DL (ref 8.3–10.6)
CASTS #/AREA URNS LPF: ABNORMAL /LPF
CHLORIDE SERPL-SCNC: 101 MMOL/L (ref 99–110)
CHLORIDE SERPL-SCNC: 101 MMOL/L (ref 99–110)
CHLORIDE SERPL-SCNC: 104 MMOL/L (ref 99–110)
CHLORIDE SERPL-SCNC: 99 MMOL/L (ref 99–110)
CLARITY UR: CLEAR
CO2 SERPL-SCNC: 15 MMOL/L (ref 21–32)
CO2 SERPL-SCNC: 15 MMOL/L (ref 21–32)
CO2 SERPL-SCNC: 17 MMOL/L (ref 21–32)
CO2 SERPL-SCNC: 19 MMOL/L (ref 21–32)
COARSE GRAN CASTS #/AREA URNS LPF: ABNORMAL /LPF (ref 0–2)
COLOR UR: YELLOW
CREAT SERPL-MCNC: 8.2 MG/DL (ref 0.8–1.3)
CREAT SERPL-MCNC: 8.3 MG/DL (ref 0.8–1.3)
CREAT SERPL-MCNC: 8.3 MG/DL (ref 0.8–1.3)
CREAT SERPL-MCNC: 8.4 MG/DL (ref 0.8–1.3)
DEPRECATED RDW RBC AUTO: 14.1 % (ref 12.4–15.4)
DESCRIPTION BLOOD BANK: NORMAL
EKG DIAGNOSIS: NORMAL
EKG Q-T INTERVAL: 354 MS
EKG QRS DURATION: 92 MS
EKG QTC CALCULATION (BAZETT): 461 MS
EKG R AXIS: 43 DEGREES
EKG T AXIS: 177 DEGREES
EKG VENTRICULAR RATE: 102 BPM
EOSINOPHIL # BLD: 0.1 K/UL (ref 0–0.6)
EOSINOPHIL NFR BLD: 1 %
EPI CELLS #/AREA URNS HPF: ABNORMAL /HPF (ref 0–5)
GFR SERPLBLD CREATININE-BSD FMLA CKD-EPI: 6 ML/MIN/{1.73_M2}
GFR SERPLBLD CREATININE-BSD FMLA CKD-EPI: 7 ML/MIN/{1.73_M2}
GLUCOSE BLD-MCNC: 118 MG/DL (ref 70–99)
GLUCOSE BLD-MCNC: 124 MG/DL (ref 70–99)
GLUCOSE BLD-MCNC: 137 MG/DL (ref 70–99)
GLUCOSE BLD-MCNC: 166 MG/DL (ref 70–99)
GLUCOSE BLD-MCNC: 186 MG/DL (ref 70–99)
GLUCOSE SERPL-MCNC: 113 MG/DL (ref 70–99)
GLUCOSE SERPL-MCNC: 134 MG/DL (ref 70–99)
GLUCOSE SERPL-MCNC: 152 MG/DL (ref 70–99)
GLUCOSE SERPL-MCNC: 177 MG/DL (ref 70–99)
GLUCOSE UR STRIP.AUTO-MCNC: NEGATIVE MG/DL
HCT VFR BLD AUTO: 20.3 % (ref 40.5–52.5)
HCT VFR BLD AUTO: 20.8 % (ref 40.5–52.5)
HGB BLD-MCNC: 6.9 G/DL (ref 13.5–17.5)
HGB BLD-MCNC: 7.1 G/DL (ref 13.5–17.5)
HGB UR QL STRIP.AUTO: ABNORMAL
INR PPP: 1.14 (ref 0.85–1.15)
KETONES UR STRIP.AUTO-MCNC: ABNORMAL MG/DL
LACTATE BLDV-SCNC: 1.4 MMOL/L (ref 0.4–1.9)
LEUKOCYTE ESTERASE UR QL STRIP.AUTO: ABNORMAL
LYMPHOCYTES # BLD: 0.8 K/UL (ref 1–5.1)
LYMPHOCYTES NFR BLD: 14.5 %
MCH RBC QN AUTO: 31.9 PG (ref 26–34)
MCHC RBC AUTO-ENTMCNC: 33.9 G/DL (ref 31–36)
MCV RBC AUTO: 94.1 FL (ref 80–100)
MONOCYTES # BLD: 0.4 K/UL (ref 0–1.3)
MONOCYTES NFR BLD: 8 %
NEUTROPHILS # BLD: 4.2 K/UL (ref 1.7–7.7)
NEUTROPHILS NFR BLD: 76.2 %
NITRITE UR QL STRIP.AUTO: NEGATIVE
PERFORMED ON: ABNORMAL
PH UR STRIP.AUTO: 5.5 [PH] (ref 5–8)
PHOSPHATE SERPL-MCNC: 6.5 MG/DL (ref 2.5–4.9)
PLATELET # BLD AUTO: 255 K/UL (ref 135–450)
PMV BLD AUTO: 7.7 FL (ref 5–10.5)
POTASSIUM SERPL-SCNC: 5 MMOL/L (ref 3.5–5.1)
POTASSIUM SERPL-SCNC: 5.2 MMOL/L (ref 3.5–5.1)
POTASSIUM SERPL-SCNC: 5.4 MMOL/L (ref 3.5–5.1)
POTASSIUM SERPL-SCNC: 5.9 MMOL/L (ref 3.5–5.1)
POTASSIUM SERPL-SCNC: 7.3 MMOL/L (ref 3.5–5.1)
PROT SERPL-MCNC: 5.6 G/DL (ref 6.4–8.2)
PROT UR STRIP.AUTO-MCNC: 30 MG/DL
PROTHROMBIN TIME: 14.8 SEC (ref 11.9–14.9)
RBC # BLD AUTO: 2.16 M/UL (ref 4.2–5.9)
RBC #/AREA URNS HPF: ABNORMAL /HPF (ref 0–4)
SODIUM SERPL-SCNC: 133 MMOL/L (ref 136–145)
SODIUM SERPL-SCNC: 133 MMOL/L (ref 136–145)
SODIUM SERPL-SCNC: 134 MMOL/L (ref 136–145)
SODIUM SERPL-SCNC: 136 MMOL/L (ref 136–145)
SP GR UR STRIP.AUTO: 1.02 (ref 1–1.03)
UA DIPSTICK W REFLEX MICRO PNL UR: YES
URN SPEC COLLECT METH UR: ABNORMAL
UROBILINOGEN UR STRIP-ACNC: 0.2 E.U./DL
WBC # BLD AUTO: 5.5 K/UL (ref 4–11)
WBC #/AREA URNS HPF: ABNORMAL /HPF (ref 0–5)

## 2025-02-15 PROCEDURE — 36430 TRANSFUSION BLD/BLD COMPNT: CPT

## 2025-02-15 PROCEDURE — 30233N1 TRANSFUSION OF NONAUTOLOGOUS RED BLOOD CELLS INTO PERIPHERAL VEIN, PERCUTANEOUS APPROACH: ICD-10-PCS | Performed by: INTERNAL MEDICINE

## 2025-02-15 PROCEDURE — 86923 COMPATIBILITY TEST ELECTRIC: CPT

## 2025-02-15 PROCEDURE — 81001 URINALYSIS AUTO W/SCOPE: CPT

## 2025-02-15 PROCEDURE — 1200000000 HC SEMI PRIVATE

## 2025-02-15 PROCEDURE — P9016 RBC LEUKOCYTES REDUCED: HCPCS

## 2025-02-15 PROCEDURE — 85018 HEMOGLOBIN: CPT

## 2025-02-15 PROCEDURE — 2500000003 HC RX 250 WO HCPCS: Performed by: INTERNAL MEDICINE

## 2025-02-15 PROCEDURE — 83036 HEMOGLOBIN GLYCOSYLATED A1C: CPT

## 2025-02-15 PROCEDURE — 86901 BLOOD TYPING SEROLOGIC RH(D): CPT

## 2025-02-15 PROCEDURE — 86850 RBC ANTIBODY SCREEN: CPT

## 2025-02-15 PROCEDURE — 85014 HEMATOCRIT: CPT

## 2025-02-15 PROCEDURE — 6370000000 HC RX 637 (ALT 250 FOR IP): Performed by: HOSPITALIST

## 2025-02-15 PROCEDURE — 86900 BLOOD TYPING SEROLOGIC ABO: CPT

## 2025-02-15 PROCEDURE — 93010 ELECTROCARDIOGRAM REPORT: CPT | Performed by: INTERNAL MEDICINE

## 2025-02-15 PROCEDURE — 6360000002 HC RX W HCPCS: Performed by: HOSPITALIST

## 2025-02-15 PROCEDURE — 51702 INSERT TEMP BLADDER CATH: CPT

## 2025-02-15 PROCEDURE — 2500000003 HC RX 250 WO HCPCS: Performed by: HOSPITALIST

## 2025-02-15 PROCEDURE — 2060000000 HC ICU INTERMEDIATE R&B

## 2025-02-15 PROCEDURE — 82746 ASSAY OF FOLIC ACID SERUM: CPT

## 2025-02-15 PROCEDURE — 85610 PROTHROMBIN TIME: CPT

## 2025-02-15 PROCEDURE — 83540 ASSAY OF IRON: CPT

## 2025-02-15 PROCEDURE — 80053 COMPREHEN METABOLIC PANEL: CPT

## 2025-02-15 PROCEDURE — 6370000000 HC RX 637 (ALT 250 FOR IP): Performed by: INTERNAL MEDICINE

## 2025-02-15 PROCEDURE — 36415 COLL VENOUS BLD VENIPUNCTURE: CPT

## 2025-02-15 PROCEDURE — 85025 COMPLETE CBC W/AUTO DIFF WBC: CPT

## 2025-02-15 PROCEDURE — 83550 IRON BINDING TEST: CPT

## 2025-02-15 PROCEDURE — 83605 ASSAY OF LACTIC ACID: CPT

## 2025-02-15 RX ORDER — DEXTROSE MONOHYDRATE 100 MG/ML
INJECTION, SOLUTION INTRAVENOUS CONTINUOUS PRN
Status: DISCONTINUED | OUTPATIENT
Start: 2025-02-15 | End: 2025-02-20 | Stop reason: HOSPADM

## 2025-02-15 RX ORDER — ROSUVASTATIN CALCIUM 10 MG/1
5 TABLET, COATED ORAL NIGHTLY
Status: DISCONTINUED | OUTPATIENT
Start: 2025-02-15 | End: 2025-02-20 | Stop reason: HOSPADM

## 2025-02-15 RX ORDER — INSULIN LISPRO 100 [IU]/ML
0-4 INJECTION, SOLUTION INTRAVENOUS; SUBCUTANEOUS
Status: DISCONTINUED | OUTPATIENT
Start: 2025-02-15 | End: 2025-02-20 | Stop reason: HOSPADM

## 2025-02-15 RX ORDER — GABAPENTIN 100 MG/1
100 CAPSULE ORAL 3 TIMES DAILY
Status: DISCONTINUED | OUTPATIENT
Start: 2025-02-15 | End: 2025-02-15

## 2025-02-15 RX ORDER — HYDRALAZINE HYDROCHLORIDE 20 MG/ML
10 INJECTION INTRAMUSCULAR; INTRAVENOUS EVERY 4 HOURS PRN
Status: DISCONTINUED | OUTPATIENT
Start: 2025-02-15 | End: 2025-02-20 | Stop reason: HOSPADM

## 2025-02-15 RX ORDER — INSULIN GLARGINE 100 [IU]/ML
8 INJECTION, SOLUTION SUBCUTANEOUS DAILY
Status: DISCONTINUED | OUTPATIENT
Start: 2025-02-15 | End: 2025-02-20 | Stop reason: HOSPADM

## 2025-02-15 RX ORDER — FOLIC ACID 1 MG/1
1 TABLET ORAL DAILY
Status: DISCONTINUED | OUTPATIENT
Start: 2025-02-15 | End: 2025-02-20 | Stop reason: HOSPADM

## 2025-02-15 RX ORDER — GLUCAGON 1 MG/ML
1 KIT INJECTION PRN
Status: DISCONTINUED | OUTPATIENT
Start: 2025-02-15 | End: 2025-02-20 | Stop reason: HOSPADM

## 2025-02-15 RX ORDER — METOPROLOL SUCCINATE 25 MG/1
12.5 TABLET, EXTENDED RELEASE ORAL DAILY
Status: DISCONTINUED | OUTPATIENT
Start: 2025-02-15 | End: 2025-02-15

## 2025-02-15 RX ORDER — HYDROXYCHLOROQUINE SULFATE 200 MG/1
200 TABLET, FILM COATED ORAL DAILY
Status: DISCONTINUED | OUTPATIENT
Start: 2025-02-15 | End: 2025-02-20 | Stop reason: HOSPADM

## 2025-02-15 RX ORDER — GABAPENTIN 100 MG/1
200 CAPSULE ORAL 3 TIMES DAILY
Status: DISCONTINUED | OUTPATIENT
Start: 2025-02-15 | End: 2025-02-20 | Stop reason: HOSPADM

## 2025-02-15 RX ORDER — SODIUM CHLORIDE 9 MG/ML
INJECTION, SOLUTION INTRAVENOUS PRN
Status: DISCONTINUED | OUTPATIENT
Start: 2025-02-15 | End: 2025-02-20 | Stop reason: HOSPADM

## 2025-02-15 RX ADMIN — HYDROXYCHLOROQUINE SULFATE 200 MG: 200 TABLET ORAL at 10:56

## 2025-02-15 RX ADMIN — HEPARIN SODIUM 5000 UNITS: 5000 INJECTION INTRAVENOUS; SUBCUTANEOUS at 14:39

## 2025-02-15 RX ADMIN — FOLIC ACID 1 MG: 1 TABLET ORAL at 08:42

## 2025-02-15 RX ADMIN — SODIUM BICARBONATE: 84 INJECTION, SOLUTION INTRAVENOUS at 20:42

## 2025-02-15 RX ADMIN — SODIUM BICARBONATE: 84 INJECTION, SOLUTION INTRAVENOUS at 06:57

## 2025-02-15 RX ADMIN — GABAPENTIN 200 MG: 100 CAPSULE ORAL at 14:38

## 2025-02-15 RX ADMIN — INSULIN LISPRO 1 UNITS: 100 INJECTION, SOLUTION INTRAVENOUS; SUBCUTANEOUS at 20:40

## 2025-02-15 RX ADMIN — INSULIN GLARGINE 8 UNITS: 100 INJECTION, SOLUTION SUBCUTANEOUS at 08:42

## 2025-02-15 RX ADMIN — HEPARIN SODIUM 5000 UNITS: 5000 INJECTION INTRAVENOUS; SUBCUTANEOUS at 20:34

## 2025-02-15 RX ADMIN — ROSUVASTATIN CALCIUM 5 MG: 10 TABLET, FILM COATED ORAL at 20:34

## 2025-02-15 RX ADMIN — GABAPENTIN 200 MG: 100 CAPSULE ORAL at 08:42

## 2025-02-15 RX ADMIN — GABAPENTIN 200 MG: 100 CAPSULE ORAL at 20:34

## 2025-02-15 RX ADMIN — SODIUM CHLORIDE, PRESERVATIVE FREE 10 ML: 5 INJECTION INTRAVENOUS at 20:34

## 2025-02-15 RX ADMIN — HEPARIN SODIUM 5000 UNITS: 5000 INJECTION INTRAVENOUS; SUBCUTANEOUS at 05:51

## 2025-02-15 NOTE — H&P
[facility] Medicine History & Physical    V 1.6    Date of Admission: 2/14/2025    Date of Service:  Pt seen/examined on 2/14/2025    [x]Admitted to Inpatient with expected LOS greater than two midnights due to medical therapy.  []Placed in Observation status.    Chief Admission Complaint:  nausea vomiting diarrhea x 3 days    Presenting Admission History:      69-year-old gentleman past medical history significant for diabetes and chronic kidney disease presented to the hospital with nausea vomiting and not eating and drinking well for 3 days plus.  Patient at home with an angiotensin receptor blocker and NSAIDS  In the ED, he is found to have significant acute kidney injury (Crt 8.7) and severe hyperkalemia (7.3) along with severe acidosis. He was given lokelma by ED physician and nephrology was consulted.  EKG showed no acute ST T wave changes.  Nephrology to begin sodium bicarbonate drip. They requested krishna catheter and CAT scan of the abdomen and they plan to recheck labs in a few hours and they want the patient admitted to the ICU.    Assessment/Plan:      Current Principal Problem:  Hyperkalemia    WOLF with severe hyperkalemia   - Crt 8.7, K 7.3 2/14   - admit to ICU, monitor on tele   - EKG reviewed with no acute changes   - lokelma x 1 given by ED   - nephrology consult appreciated   - to begin sodium bicarb gtt and recheck K level   - krishna insertion   - CT abd/pel  Nausea, vomiting, diarrhea x 3 days   - check CT abd/pel   - zofran PRN  CKD   - nephrology consult  DM II not on insulin   - insulin scale    Labs/medications/imaging reviewed    Dispo: High risk to deteriorate with multiple medical problems and severely elevated K and Crt              High level medical complexity               Guarded prognosis          Discussed management and the need for Hospitalization of the patient w/ the Emergency Department Provider: Yes    CXR: I have reviewed the CXR with the following interpretation: none

## 2025-02-15 NOTE — CONSULTS
2156- [facility] for [facility] consult   RE: hyperkalemia per Dr. Mamadou Cristina  2212- [facility] called back to inform they couldn't take this patient as the doctor states they have seen another doctor within the last year   2214- Called [facility] to place a consult  2223- [facility] returned page to Dr. Cristina

## 2025-02-15 NOTE — CONSENT
Informed Consent for Blood Component Transfusion Note    I have discussed with the patient the rationale for blood component transfusion; its benefits in treating or preventing fatigue, organ damage, or death; and its risk which includes mild transfusion reactions, rare risk of blood borne infection, or more serious but rare reactions. I have discussed the alternatives to transfusion, including the risk and consequences of not receiving transfusion. The patient had an opportunity to ask questions and had agreed to proceed with transfusion of blood components.    Electronically signed by Franny Mckee MD on 2/15/25 at 7:25 AM EST

## 2025-02-15 NOTE — PLAN OF CARE
Problem: Chronic Conditions and Co-morbidities  Goal: Patient's chronic conditions and co-morbidity symptoms are monitored and maintained or improved  Outcome: Progressing   Pt educated on the importance of a carb control diet. Monitoring pt's blood glucose AC/HS. Sliding scale insulin given as ordered according to pt's blood glucose. Will continue to monitor.    Problem: Discharge Planning  Goal: Discharge to home or other facility with appropriate resources  Outcome: Progressing  Flowsheets (Taken 2/15/2025 1491 by Lorena Bedolla, RN)  Discharge to home or other facility with appropriate resources:   Identify barriers to discharge with patient and caregiver   Identify discharge learning needs (meds, wound care, etc)   Refer to discharge planning if patient needs post-hospital services based on physician order or complex needs related to functional status, cognitive ability or social support system   Arrange for needed discharge resources and transportation as appropriate   Arrange for interpreters to assist at discharge as needed     Problem: ABCDS Injury Assessment  Goal: Absence of physical injury  Outcome: Progressing     CHF Care Plan      Patient's EF (Ejection Fraction) is greater than 40%    Heart Failure Medications:  Diuretics:: None    (One of the following REQUIRED for EF </= 40%/SYSTOLIC FAILURE but MAY be used in EF% >40%/DIASTOLIC FAILURE)        ACE:: None        ARB:: None         ARNI:: None    (Beta Blockers)  NON- Evidenced Based Beta Blocker (for EF% >40%/DIASTOLIC FAILURE): None    Evidenced Based Beta Blocker::(REQUIRED for EF% <40%/SYSTOLIC FAILURE) None  ...................................................................................................................................................    Failed to redirect to the Timeline version of the [employer] Smart[employer].      Patient's weights and intake/output reviewed    Daily Weight log at bedside, patient/family participation in use of

## 2025-02-15 NOTE — ED NOTES
Ant Carrasquillo is a 69 y.o. male admitted for  Principal Problem:    Hyperkalemia  Resolved Problems:    * No resolved hospital problems. *  .   Patient Home via EMS transportation with   Chief Complaint   Patient presents with    Vomiting     Pt states he has been feeling ill for approx 3 days   .  Patient is alert and Person, Place, Time, and Situation  Patient's baseline mobility: Baseline Mobility: Independent   Code Status: Full Code   Cardiac Rhythm:       Is patient on baseline Oxygen: no how many Liters:   Abnormal Assessment Findings: Nausea today and diarrhea the last couple days.     Isolation:       NIH Score:    C-SSRS: Risk of Suicide: No Risk  Bedside swallow:        Active LDA's:   Peripheral IV 02/14/25 Right Antecubital (Active)     Patient admitted with a krishna: yes,  If the krishna is chronic was it exchanged:  Reason for krishna: Acute renal failure         Family/Caregiver Present no Any Concerns: no   Restraints no  Sitter no         Vitals: MEWS Score: 2    Vitals:    02/14/25 2141 02/14/25 2150 02/14/25 2219 02/14/25 2333   BP:  (!) 119/92 125/88 104/86   Pulse: 98 (!) 101 (!) 106 78   Resp: 18 17  16   Temp:       SpO2: 97% 95% 96% 100%   Weight:           Last documented pain score (0-10 scale)    Pain medication administered No.    Pertinent or High Risk Medications/Drips: Yes- see MAR.    Pending Blood Product Administration: no    Abnormal labs:   Abnormal Labs Reviewed   CBC WITH AUTO DIFFERENTIAL - Abnormal; Notable for the following components:       Result Value    RBC 2.56 (*)     Hemoglobin 8.0 (*)     Hematocrit 24.6 (*)     Lymphocytes Absolute 0.6 (*)     All other components within normal limits   COMPREHENSIVE METABOLIC PANEL W/ REFLEX TO MG FOR LOW K - Abnormal; Notable for the following components:    Sodium 132 (*)     Potassium reflex Magnesium 7.3 (*)     CO2 9 (*)     Anion Gap 20 (*)     Glucose 160 (*)      (*)     Creatinine 8.7 (*)     Est, Glom Filt Rate 6

## 2025-02-15 NOTE — CONSULTS
Patient ID: Ant Carrasquillo  Referring/ Physician: Rashad Alva MD      Summary:   Ant Carrasquillo is being seen by nephrology for WOLF.     Reason for admission: Vomiting      Interval Hx:     Seen at bedside  Awake and alert  So catheter in place about 330 cc of urine output  On bicarb drip  No edema no rashes.    Blood pressure 107/72  Urine output 310 cc     creatinine 8.3 potassium 5.9      Assessment/Plan:   -Seems his renal failure must been going on for several days because he is completely alert and oriented with a BUN of 100.  -He is making some urine and the potassium is coming down so we will continue with IV fluids and try to avoid dialysis for now.  -Repeat BMP is pending  -150 mill equivalent sodium bicarb at 150 cc/h  Maintain So catheter strict I's and O's    Discussed with RN at bedside to notify me if urine output drops off      Acute kidney injury  He has some baseline chronic kidney disease his last creatinine was 1.6  He had an WOLF back in December 2023 when his creatinine was in the high threes.  Now his creatinine is 8.7 with a BUN of 100 and he is coming in with signs of volume depletion diarrhea vomiting.  He was also taking Voltaren.  His albumin creatinine ratio was 92 mg, no urinalysis was sent  He does have a history of diabetes and some albuminuria.  Continue with IV fluids  Maintain So catheter  CT showed small bilateral effusions but no acute abnormalities of the kidneys no hydronephrosis or hydroureter but he did have bilateral nephrolithiasis.   -Will follow-up the UA and if there is any active urine sediment we will consider sending serologies but seems like this is a prerenal kidney injury as of now.    Hyperkalemia  Severe with a potassium over 7  Coming down with medical treatment  Follow-up last potassium was 5.9  Getting [facility]    History of hypertension  Blood pressure low on admission with prior to admission he was on

## 2025-02-15 NOTE — ED NOTES
Ant Carrasquillo is a 69 y.o. male admitted for  Principal Problem:    Hyperkalemia  Resolved Problems:    * No resolved hospital problems. *  .   Patient Home via EMS transportation with   Chief Complaint   Patient presents with    Vomiting     Pt states he has been feeling ill for approx 3 days   .  Patient is alert and Person, Place, Time, and Situation  Patient's baseline mobility: Baseline Mobility: Independent   Code Status: Full Code   Cardiac Rhythm:       Is patient on baseline Oxygen:  how many Liters:   Abnormal Assessment Findings:     Isolation: None      NIH Score:    C-SSRS: Risk of Suicide: No Risk  Bedside swallow:        Active LDA's:   Peripheral IV 02/14/25 Right Antecubital (Active)     Patient admitted with a krishna: yes,  If the krishna is chronic was it exchanged:NA  Reason for krishna: Retention (st.cath protocol followed first)    Patient admitted with Central Line:  . PICC line placement confirmed: YES OR NO:[id number]}   Reason for Central line:   Was central line Inserted from an outside facility:        Family/Caregiver Present  Any Concerns:    Restraints no  Sitter no         Vitals: MEWS Score: 2    Vitals:    02/15/25 0043 02/15/25 0229 02/15/25 0303 02/15/25 0403   BP: 99/65 102/67 105/70 103/73   Pulse: 75 74 72 73   Resp: 17 17 15 17   Temp:       SpO2:   100% 97%   Weight:           Last documented pain score (0-10 scale)    Pain medication administered Yes- see MAR.    Pertinent or High Risk Medications/Drips: Yes- see MAR.    Pending Blood Product Administration: no    Abnormal labs:   Abnormal Labs Reviewed   CBC WITH AUTO DIFFERENTIAL - Abnormal; Notable for the following components:       Result Value    RBC 2.56 (*)     Hemoglobin 8.0 (*)     Hematocrit 24.6 (*)     Lymphocytes Absolute 0.6 (*)     All other components within normal limits   COMPREHENSIVE METABOLIC PANEL W/ REFLEX TO MG FOR LOW K - Abnormal; Notable for the following components:    Sodium 132 (*)     Potassium

## 2025-02-15 NOTE — ED PROVIDER NOTES
EMERGENCY DEPARTMENT ENCOUNTER     [facility] EMERGENCY DEPARTMENT     Pt Name: Ant Carrasquillo   MRN: [id number]   Birthdate 1955   Date of evaluation: 2/14/2025   Provider: Mamadou Cristina MD   PCP: Minnie Coelho MD   Note Started: 12:07 AM EST 2/15/25     CHIEF COMPLAINT     Chief Complaint   Patient presents with    Vomiting     Pt states he has been feeling ill for approx 3 days        HISTORY OF PRESENT ILLNESS:  History from : Patient   Limitations to history : None     Ant Carrasquillo is a 69 y.o. male who presents for vomiting.  Patient reports vomiting and diarrhea for the last 3 days.  Emesis and stool were both neither bloody nor black.  No fevers.  No pain.  Patient reports he was able to urinate earlier today.    Nursing Notes were all reviewed and agreed with or any disagreements were addressed in the HPI.     ROS: Positives and Pertinent negatives as per HPI.    PAST MEDICAL HISTORY     Past medical history:  has a past medical history of Diabetes mellitus (HCC), Hyperlipidemia, and Hypertension.    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    Med list:   No current facility-administered medications on file prior to encounter.     Current Outpatient Medications on File Prior to Encounter   Medication Sig Dispense Refill    diclofenac (VOLTAREN) 75 MG EC tablet Take 1 tablet by mouth 2 times daily 180 tablet 1    gabapentin (NEURONTIN) 800 MG tablet Take 1 tablet by mouth at bedtime for 180 days. 90 tablet 1    turmeric 500 MG CAPS Take 1 capsule by mouth daily      Boswellia-Glucosamine-Vit D (OSTEO BI-FLEX ONE PER DAY PO) Take by mouth      glipiZIDE (GLUCOTROL) 5 MG tablet Take 1 tablet by mouth 2 times daily (before meals) 180 tablet 1    losartan (COZAAR) 25 MG tablet

## 2025-02-16 LAB
ANION GAP SERPL CALCULATED.3IONS-SCNC: 15 MMOL/L (ref 3–16)
ANION GAP SERPL CALCULATED.3IONS-SCNC: 16 MMOL/L (ref 3–16)
BASOPHILS # BLD: 0 K/UL (ref 0–0.2)
BASOPHILS NFR BLD: 0.5 %
BUN SERPL-MCNC: 91 MG/DL (ref 7–20)
BUN SERPL-MCNC: 92 MG/DL (ref 7–20)
BUN SERPL-MCNC: 92 MG/DL (ref 7–20)
BUN SERPL-MCNC: 94 MG/DL (ref 7–20)
CALCIUM SERPL-MCNC: 7.8 MG/DL (ref 8.3–10.6)
CALCIUM SERPL-MCNC: 7.9 MG/DL (ref 8.3–10.6)
CALCIUM SERPL-MCNC: 8 MG/DL (ref 8.3–10.6)
CALCIUM SERPL-MCNC: 8.1 MG/DL (ref 8.3–10.6)
CHLORIDE SERPL-SCNC: 98 MMOL/L (ref 99–110)
CHLORIDE SERPL-SCNC: 99 MMOL/L (ref 99–110)
CO2 SERPL-SCNC: 19 MMOL/L (ref 21–32)
CO2 SERPL-SCNC: 19 MMOL/L (ref 21–32)
CO2 SERPL-SCNC: 20 MMOL/L (ref 21–32)
CO2 SERPL-SCNC: 21 MMOL/L (ref 21–32)
CREAT SERPL-MCNC: 7.9 MG/DL (ref 0.8–1.3)
CREAT SERPL-MCNC: 7.9 MG/DL (ref 0.8–1.3)
CREAT SERPL-MCNC: 8.1 MG/DL (ref 0.8–1.3)
CREAT SERPL-MCNC: 8.3 MG/DL (ref 0.8–1.3)
DEPRECATED RDW RBC AUTO: 14.7 % (ref 12.4–15.4)
EOSINOPHIL # BLD: 0.3 K/UL (ref 0–0.6)
EOSINOPHIL NFR BLD: 4.3 %
EST. AVERAGE GLUCOSE BLD GHB EST-MCNC: 122.6 MG/DL
FOLATE SERPL-MCNC: 12.8 NG/ML (ref 4.78–24.2)
GFR SERPLBLD CREATININE-BSD FMLA CKD-EPI: 6 ML/MIN/{1.73_M2}
GFR SERPLBLD CREATININE-BSD FMLA CKD-EPI: 7 ML/MIN/{1.73_M2}
GLUCOSE BLD-MCNC: 119 MG/DL (ref 70–99)
GLUCOSE BLD-MCNC: 163 MG/DL (ref 70–99)
GLUCOSE BLD-MCNC: 182 MG/DL (ref 70–99)
GLUCOSE BLD-MCNC: 87 MG/DL (ref 70–99)
GLUCOSE SERPL-MCNC: 115 MG/DL (ref 70–99)
GLUCOSE SERPL-MCNC: 135 MG/DL (ref 70–99)
GLUCOSE SERPL-MCNC: 176 MG/DL (ref 70–99)
GLUCOSE SERPL-MCNC: 81 MG/DL (ref 70–99)
HBA1C MFR BLD: 5.9 %
HCT VFR BLD AUTO: 21.1 % (ref 40.5–52.5)
HGB BLD-MCNC: 7.2 G/DL (ref 13.5–17.5)
IRON SATN MFR SERPL: 14 % (ref 20–50)
IRON SERPL-MCNC: 30 UG/DL (ref 59–158)
LYMPHOCYTES # BLD: 1.1 K/UL (ref 1–5.1)
LYMPHOCYTES NFR BLD: 17 %
MCH RBC QN AUTO: 31.2 PG (ref 26–34)
MCHC RBC AUTO-ENTMCNC: 34.2 G/DL (ref 31–36)
MCV RBC AUTO: 91.2 FL (ref 80–100)
MONOCYTES # BLD: 0.7 K/UL (ref 0–1.3)
MONOCYTES NFR BLD: 10.4 %
NEUTROPHILS # BLD: 4.4 K/UL (ref 1.7–7.7)
NEUTROPHILS NFR BLD: 67.8 %
PERFORMED ON: ABNORMAL
PERFORMED ON: NORMAL
PLATELET # BLD AUTO: 235 K/UL (ref 135–450)
PMV BLD AUTO: 7.9 FL (ref 5–10.5)
POTASSIUM SERPL-SCNC: 4.7 MMOL/L (ref 3.5–5.1)
POTASSIUM SERPL-SCNC: 4.8 MMOL/L (ref 3.5–5.1)
RBC # BLD AUTO: 2.31 M/UL (ref 4.2–5.9)
SODIUM SERPL-SCNC: 132 MMOL/L (ref 136–145)
SODIUM SERPL-SCNC: 133 MMOL/L (ref 136–145)
SODIUM SERPL-SCNC: 134 MMOL/L (ref 136–145)
SODIUM SERPL-SCNC: 134 MMOL/L (ref 136–145)
TIBC SERPL-MCNC: 215 UG/DL (ref 260–445)
WBC # BLD AUTO: 6.6 K/UL (ref 4–11)

## 2025-02-16 PROCEDURE — 2500000003 HC RX 250 WO HCPCS: Performed by: HOSPITALIST

## 2025-02-16 PROCEDURE — 1200000000 HC SEMI PRIVATE

## 2025-02-16 PROCEDURE — 80048 BASIC METABOLIC PNL TOTAL CA: CPT

## 2025-02-16 PROCEDURE — 6370000000 HC RX 637 (ALT 250 FOR IP): Performed by: HOSPITALIST

## 2025-02-16 PROCEDURE — 6370000000 HC RX 637 (ALT 250 FOR IP): Performed by: INTERNAL MEDICINE

## 2025-02-16 PROCEDURE — 6360000002 HC RX W HCPCS: Performed by: HOSPITALIST

## 2025-02-16 PROCEDURE — 2060000000 HC ICU INTERMEDIATE R&B

## 2025-02-16 PROCEDURE — 85025 COMPLETE CBC W/AUTO DIFF WBC: CPT

## 2025-02-16 PROCEDURE — 36415 COLL VENOUS BLD VENIPUNCTURE: CPT

## 2025-02-16 RX ADMIN — HYDROXYCHLOROQUINE SULFATE 200 MG: 200 TABLET ORAL at 08:37

## 2025-02-16 RX ADMIN — HEPARIN SODIUM 5000 UNITS: 5000 INJECTION INTRAVENOUS; SUBCUTANEOUS at 20:10

## 2025-02-16 RX ADMIN — ROSUVASTATIN CALCIUM 5 MG: 10 TABLET, FILM COATED ORAL at 20:09

## 2025-02-16 RX ADMIN — HEPARIN SODIUM 5000 UNITS: 5000 INJECTION INTRAVENOUS; SUBCUTANEOUS at 05:00

## 2025-02-16 RX ADMIN — GABAPENTIN 200 MG: 100 CAPSULE ORAL at 13:56

## 2025-02-16 RX ADMIN — GABAPENTIN 200 MG: 100 CAPSULE ORAL at 08:37

## 2025-02-16 RX ADMIN — INSULIN LISPRO 1 UNITS: 100 INJECTION, SOLUTION INTRAVENOUS; SUBCUTANEOUS at 17:57

## 2025-02-16 RX ADMIN — GABAPENTIN 200 MG: 100 CAPSULE ORAL at 20:09

## 2025-02-16 RX ADMIN — HEPARIN SODIUM 5000 UNITS: 5000 INJECTION INTRAVENOUS; SUBCUTANEOUS at 13:56

## 2025-02-16 RX ADMIN — INSULIN GLARGINE 8 UNITS: 100 INJECTION, SOLUTION SUBCUTANEOUS at 08:37

## 2025-02-16 RX ADMIN — SALINE NASAL SPRAY 1 SPRAY: 1.5 SOLUTION NASAL at 08:38

## 2025-02-16 RX ADMIN — SODIUM CHLORIDE, PRESERVATIVE FREE 10 ML: 5 INJECTION INTRAVENOUS at 20:10

## 2025-02-16 RX ADMIN — SODIUM CHLORIDE, PRESERVATIVE FREE 10 ML: 5 INJECTION INTRAVENOUS at 08:40

## 2025-02-16 RX ADMIN — FOLIC ACID 1 MG: 1 TABLET ORAL at 08:37

## 2025-02-16 NOTE — PLAN OF CARE
Problem: Chronic Conditions and Co-morbidities  Goal: Patient's chronic conditions and co-morbidity symptoms are monitored and maintained or improved  2/16/2025 1447 by Gee Bolden RN  Outcome: Progressing   Pt educated on the importance of a carb control diet. Monitoring pt's blood glucose AC/HS. Sliding scale insulin given as ordered according to pt's blood glucose. Will continue to monitor.    Problem: ABCDS Injury Assessment  Goal: Absence of physical injury  2/16/2025 1447 by Gee Bolden, RN  Outcome: Progressing     Problem: Safety - Adult  Goal: Free from fall injury  2/16/2025 1447 by Gee Bolden, RN  Outcome: Progressing   Encouraged to call for assistance before getting out of bed, call light within reach, non skid slipper socks on, bed alarm on.    CHF Care Plan      Patient's EF (Ejection Fraction) is greater than 40%    Heart Failure Medications:  Diuretics:: None    (One of the following REQUIRED for EF </= 40%/SYSTOLIC FAILURE but MAY be used in EF% >40%/DIASTOLIC FAILURE)        ACE:: None        ARB:: None         ARNI:: None    (Beta Blockers)  NON- Evidenced Based Beta Blocker (for EF% >40%/DIASTOLIC FAILURE): None    Evidenced Based Beta Blocker::(REQUIRED for EF% <40%/SYSTOLIC FAILURE) None  ...................................................................................................................................................    Failed to redirect to the Timeline version of the [employer] SmartLink.      Patient's weights and intake/output reviewed    Daily Weight log at bedside, patient/family participation in use of log: \"yes    Patient's current weight today shows a difference of 7 lbs more than last documented weight.      Intake/Output Summary (Last 24 hours) at 2/16/2025 1448  Last data filed at 2/16/2025 1430  Gross per 24 hour   Intake 3522.46 ml   Output 1025 ml   Net 2497.46 ml       Education Booklet Provided: yes    Comorbidities Reviewed Yes    Patient has a past

## 2025-02-16 NOTE — PLAN OF CARE
Problem: Chronic Conditions and Co-morbidities  Goal: Patient's chronic conditions and co-morbidity symptoms are monitored and maintained or improved  2/16/2025 0329 by Jerri Roldan RN  Outcome: Progressing  Note:   CHF Care Plan      Patient's EF (Ejection Fraction) is greater than 40%    Heart Failure Medications:  Diuretics:: None    (One of the following REQUIRED for EF </= 40%/SYSTOLIC FAILURE but MAY be used in EF% >40%/DIASTOLIC FAILURE)        ACE:: None        ARB:: None         ARNI:: None    (Beta Blockers)  NON- Evidenced Based Beta Blocker (for EF% >40%/DIASTOLIC FAILURE): None    Evidenced Based Beta Blocker::(REQUIRED for EF% <40%/SYSTOLIC FAILURE) None  ...................................................................................................................................................    Failed to redirect to the Timeline version of the [employer] SmartLink.      Patient's weights and intake/output reviewed    Daily Weight log at bedside, patient/family participation in use of log: no    Patient's current weight today shows a difference of 2 lbs less than last documented weight.      Intake/Output Summary (Last 24 hours) at 2/16/2025 0328  Last data filed at 2/15/2025 2356  Gross per 24 hour   Intake 3831.25 ml   Output 835 ml   Net 2996.25 ml       Education Booklet Provided: no    Comorbidities Reviewed Yes    Patient has a past medical history of Diabetes mellitus (HCC), Hyperlipidemia, and Hypertension.     >>For CHF and Comorbidity documentation on Education Time and Topics, please see Education Tab      CHF Education    Learners:  Patient  Readineess:   Non-acceptance  Method:   Explanation  Response:   Needs Reinforcement  Comments: Pt educate on what s/s of CHF would warrant contacting his PCP/Cardiologist upon d/c from hospital as well as the importance of monitoring daily wts to monitor fluid retention.    Time Spent: 5min      Pt resting in bed at this time on room air. Pt

## 2025-02-17 ENCOUNTER — CARE COORDINATION (OUTPATIENT)
Dept: CASE MANAGEMENT | Age: 70
End: 2025-02-17

## 2025-02-17 ENCOUNTER — APPOINTMENT (OUTPATIENT)
Age: 70
DRG: 683 | End: 2025-02-17
Attending: INTERNAL MEDICINE
Payer: MEDICARE

## 2025-02-17 LAB
ANION GAP SERPL CALCULATED.3IONS-SCNC: 14 MMOL/L (ref 3–16)
ANION GAP SERPL CALCULATED.3IONS-SCNC: 15 MMOL/L (ref 3–16)
ANION GAP SERPL CALCULATED.3IONS-SCNC: 15 MMOL/L (ref 3–16)
ANION GAP SERPL CALCULATED.3IONS-SCNC: 16 MMOL/L (ref 3–16)
BASOPHILS # BLD: 0 K/UL (ref 0–0.2)
BASOPHILS NFR BLD: 0.3 %
BUN SERPL-MCNC: 81 MG/DL (ref 7–20)
BUN SERPL-MCNC: 85 MG/DL (ref 7–20)
BUN SERPL-MCNC: 89 MG/DL (ref 7–20)
BUN SERPL-MCNC: 91 MG/DL (ref 7–20)
C3 SERPL-MCNC: 121 MG/DL (ref 90–180)
C4 SERPL-MCNC: 15.6 MG/DL (ref 10–40)
CALCIUM SERPL-MCNC: 7.9 MG/DL (ref 8.3–10.6)
CALCIUM SERPL-MCNC: 8 MG/DL (ref 8.3–10.6)
CALCIUM SERPL-MCNC: 8.1 MG/DL (ref 8.3–10.6)
CALCIUM SERPL-MCNC: 8.3 MG/DL (ref 8.3–10.6)
CHLORIDE SERPL-SCNC: 100 MMOL/L (ref 99–110)
CHLORIDE SERPL-SCNC: 101 MMOL/L (ref 99–110)
CHLORIDE SERPL-SCNC: 99 MMOL/L (ref 99–110)
CHLORIDE SERPL-SCNC: 99 MMOL/L (ref 99–110)
CO2 SERPL-SCNC: 19 MMOL/L (ref 21–32)
CO2 SERPL-SCNC: 20 MMOL/L (ref 21–32)
CREAT SERPL-MCNC: 7 MG/DL (ref 0.8–1.3)
CREAT SERPL-MCNC: 7.2 MG/DL (ref 0.8–1.3)
CREAT SERPL-MCNC: 7.6 MG/DL (ref 0.8–1.3)
CREAT SERPL-MCNC: 7.9 MG/DL (ref 0.8–1.3)
CREAT UR-MCNC: 66.9 MG/DL (ref 39–259)
CREAT UR-MCNC: 67.5 MG/DL (ref 39–259)
DEPRECATED RDW RBC AUTO: 14.3 % (ref 12.4–15.4)
ECHO AO ASC DIAM: 4 CM
ECHO AO ASCENDING AORTA INDEX: 1.87 CM/M2
ECHO AO ROOT DIAM: 4.2 CM
ECHO AO ROOT INDEX: 1.96 CM/M2
ECHO BSA: 2.17 M2
ECHO IVC EXP: 2.4 CM
ECHO IVC INSP: 1.7 CM
ECHO LV EDV A2C: 191 ML
ECHO LV EDV A4C: 171 ML
ECHO LV EDV INDEX A4C: 80 ML/M2
ECHO LV EDV NDEX A2C: 89 ML/M2
ECHO LV EJECTION FRACTION A2C: 25 %
ECHO LV EJECTION FRACTION A4C: 25 %
ECHO LV EJECTION FRACTION BIPLANE: 25 % (ref 55–100)
ECHO LV ESV A2C: 145 ML
ECHO LV ESV A4C: 128 ML
ECHO LV ESV INDEX A2C: 68 ML/M2
ECHO LV ESV INDEX A4C: 60 ML/M2
ECHO LVOT AREA: 4.2 CM2
ECHO LVOT DIAM: 2.3 CM
EOSINOPHIL # BLD: 0.3 K/UL (ref 0–0.6)
EOSINOPHIL NFR BLD: 3.4 %
GFR SERPLBLD CREATININE-BSD FMLA CKD-EPI: 7 ML/MIN/{1.73_M2}
GFR SERPLBLD CREATININE-BSD FMLA CKD-EPI: 7 ML/MIN/{1.73_M2}
GFR SERPLBLD CREATININE-BSD FMLA CKD-EPI: 8 ML/MIN/{1.73_M2}
GFR SERPLBLD CREATININE-BSD FMLA CKD-EPI: 8 ML/MIN/{1.73_M2}
GLUCOSE BLD-MCNC: 131 MG/DL (ref 70–99)
GLUCOSE BLD-MCNC: 143 MG/DL (ref 70–99)
GLUCOSE BLD-MCNC: 202 MG/DL (ref 70–99)
GLUCOSE BLD-MCNC: 213 MG/DL (ref 70–99)
GLUCOSE SERPL-MCNC: 129 MG/DL (ref 70–99)
GLUCOSE SERPL-MCNC: 146 MG/DL (ref 70–99)
GLUCOSE SERPL-MCNC: 177 MG/DL (ref 70–99)
GLUCOSE SERPL-MCNC: 209 MG/DL (ref 70–99)
HCT VFR BLD AUTO: 23.1 % (ref 40.5–52.5)
HGB BLD-MCNC: 7.9 G/DL (ref 13.5–17.5)
LYMPHOCYTES # BLD: 0.9 K/UL (ref 1–5.1)
LYMPHOCYTES NFR BLD: 8.8 %
MCH RBC QN AUTO: 31.6 PG (ref 26–34)
MCHC RBC AUTO-ENTMCNC: 34.4 G/DL (ref 31–36)
MCV RBC AUTO: 91.9 FL (ref 80–100)
MICROALBUMIN UR DL<=1MG/L-MCNC: 11.4 MG/DL
MICROALBUMIN/CREAT UR: 168.9 MG/G (ref 0–30)
MONOCYTES # BLD: 0.7 K/UL (ref 0–1.3)
MONOCYTES NFR BLD: 7.2 %
NEUTROPHILS # BLD: 7.8 K/UL (ref 1.7–7.7)
NEUTROPHILS NFR BLD: 80.3 %
PERFORMED ON: ABNORMAL
PLATELET # BLD AUTO: 252 K/UL (ref 135–450)
PMV BLD AUTO: 7.7 FL (ref 5–10.5)
POTASSIUM SERPL-SCNC: 4.5 MMOL/L (ref 3.5–5.1)
POTASSIUM SERPL-SCNC: 4.6 MMOL/L (ref 3.5–5.1)
POTASSIUM SERPL-SCNC: 4.9 MMOL/L (ref 3.5–5.1)
POTASSIUM SERPL-SCNC: 4.9 MMOL/L (ref 3.5–5.1)
PROT UR-MCNC: 32.6 MG/DL
PROT/CREAT UR-RTO: 0.5 MG/DL
RBC # BLD AUTO: 2.51 M/UL (ref 4.2–5.9)
SODIUM SERPL-SCNC: 133 MMOL/L (ref 136–145)
SODIUM SERPL-SCNC: 134 MMOL/L (ref 136–145)
SODIUM SERPL-SCNC: 135 MMOL/L (ref 136–145)
SODIUM SERPL-SCNC: 136 MMOL/L (ref 136–145)
WBC # BLD AUTO: 9.7 K/UL (ref 4–11)

## 2025-02-17 PROCEDURE — 6370000000 HC RX 637 (ALT 250 FOR IP): Performed by: HOSPITALIST

## 2025-02-17 PROCEDURE — 93308 TTE F-UP OR LMTD: CPT

## 2025-02-17 PROCEDURE — 86160 COMPLEMENT ANTIGEN: CPT

## 2025-02-17 PROCEDURE — 82043 UR ALBUMIN QUANTITATIVE: CPT

## 2025-02-17 PROCEDURE — 36415 COLL VENOUS BLD VENIPUNCTURE: CPT

## 2025-02-17 PROCEDURE — 2060000000 HC ICU INTERMEDIATE R&B

## 2025-02-17 PROCEDURE — 85025 COMPLETE CBC W/AUTO DIFF WBC: CPT

## 2025-02-17 PROCEDURE — 6370000000 HC RX 637 (ALT 250 FOR IP): Performed by: INTERNAL MEDICINE

## 2025-02-17 PROCEDURE — 82570 ASSAY OF URINE CREATININE: CPT

## 2025-02-17 PROCEDURE — 86038 ANTINUCLEAR ANTIBODIES: CPT

## 2025-02-17 PROCEDURE — 2500000003 HC RX 250 WO HCPCS: Performed by: HOSPITALIST

## 2025-02-17 PROCEDURE — 86235 NUCLEAR ANTIGEN ANTIBODY: CPT

## 2025-02-17 PROCEDURE — 86225 DNA ANTIBODY NATIVE: CPT

## 2025-02-17 PROCEDURE — 84156 ASSAY OF PROTEIN URINE: CPT

## 2025-02-17 PROCEDURE — 6360000002 HC RX W HCPCS: Performed by: HOSPITALIST

## 2025-02-17 PROCEDURE — 83516 IMMUNOASSAY NONANTIBODY: CPT

## 2025-02-17 PROCEDURE — 80048 BASIC METABOLIC PNL TOTAL CA: CPT

## 2025-02-17 RX ORDER — FLUTICASONE PROPIONATE 50 MCG
1 SPRAY, SUSPENSION (ML) NASAL DAILY PRN
Status: DISCONTINUED | OUTPATIENT
Start: 2025-02-17 | End: 2025-02-20 | Stop reason: HOSPADM

## 2025-02-17 RX ADMIN — SALINE NASAL SPRAY 1 SPRAY: 1.5 SOLUTION NASAL at 05:23

## 2025-02-17 RX ADMIN — HYDROXYCHLOROQUINE SULFATE 200 MG: 200 TABLET ORAL at 10:56

## 2025-02-17 RX ADMIN — GABAPENTIN 200 MG: 100 CAPSULE ORAL at 21:02

## 2025-02-17 RX ADMIN — GABAPENTIN 200 MG: 100 CAPSULE ORAL at 10:56

## 2025-02-17 RX ADMIN — INSULIN GLARGINE 8 UNITS: 100 INJECTION, SOLUTION SUBCUTANEOUS at 10:57

## 2025-02-17 RX ADMIN — INSULIN LISPRO 1 UNITS: 100 INJECTION, SOLUTION INTRAVENOUS; SUBCUTANEOUS at 12:41

## 2025-02-17 RX ADMIN — SODIUM CHLORIDE, PRESERVATIVE FREE 10 ML: 5 INJECTION INTRAVENOUS at 21:03

## 2025-02-17 RX ADMIN — FLUTICASONE PROPIONATE 1 SPRAY: 50 SPRAY, METERED NASAL at 10:57

## 2025-02-17 RX ADMIN — HEPARIN SODIUM 5000 UNITS: 5000 INJECTION INTRAVENOUS; SUBCUTANEOUS at 05:22

## 2025-02-17 RX ADMIN — HEPARIN SODIUM 5000 UNITS: 5000 INJECTION INTRAVENOUS; SUBCUTANEOUS at 21:01

## 2025-02-17 RX ADMIN — GABAPENTIN 200 MG: 100 CAPSULE ORAL at 15:16

## 2025-02-17 RX ADMIN — HEPARIN SODIUM 5000 UNITS: 5000 INJECTION INTRAVENOUS; SUBCUTANEOUS at 15:16

## 2025-02-17 RX ADMIN — ROSUVASTATIN CALCIUM 5 MG: 10 TABLET, FILM COATED ORAL at 21:02

## 2025-02-17 RX ADMIN — FOLIC ACID 1 MG: 1 TABLET ORAL at 10:57

## 2025-02-17 RX ADMIN — INSULIN LISPRO 1 UNITS: 100 INJECTION, SOLUTION INTRAVENOUS; SUBCUTANEOUS at 21:02

## 2025-02-17 NOTE — ACP (ADVANCE CARE PLANNING)
heart for people who have serious health conditions or who are very sick.    In the event your heart stopped, would you want attempts to restart your heart (answer \"yes\") or would you prefer a natural death (answer \"no\")? yes    If your health were to worsen and it became clear that your chance of recovery was unlikely, would that change your answer? Yes    [x] Yes  [] No   Educated Patient / Decision Maker regarding differences between Advance Directives and portable DNR orders.    Length of ACP Conversation in minutes:  20 minutes    Conversation Outcomes:  [x] ACP discussion completed  [] Existing advance directive reviewed with patient; no changes to patient's previously recorded wishes   [] New Advance Directive completed   [] Portable Do Not Rescitate prepared for Provider review and signature  [] POLST/POST/MOLST/MOST prepared for Provider review and signature      Follow-up plan:    [] Schedule follow-up conversation to continue planning  [] Referred individual to Provider for additional questions/concerns   [] Advised patient/agent/surrogate to review completed ACP document and update if needed with changes in condition, patient preferences or care setting     [] This note routed to one or more involved healthcare providers     Lora Pryor, RN  ACP Clinical specialist

## 2025-02-17 NOTE — CARE COORDINATION
CTN contacted [facility] inpatient  on A2 and LVM at [phone] regarding patient eligibility for RPM.     Maren England, MSN, RN, [facility]  Care Transition Nurse  [phone] mobile

## 2025-02-17 NOTE — PLAN OF CARE
Problem: Chronic Conditions and Co-morbidities  Goal: Patient's chronic conditions and co-morbidity symptoms are monitored and maintained or improved  Outcome: Progressing     Problem: Safety - Adult  Goal: Free from fall injury  2/17/2025 1804 by Alison Nowak RN  Outcome: Progressing     Problem: Pain  Goal: Verbalizes/displays adequate comfort level or baseline comfort level  2/17/2025 1804 by Alison Nowak RN  Outcome: Progressing

## 2025-02-17 NOTE — PLAN OF CARE
Problem: Chronic Conditions and Co-morbidities  Goal: Patient's chronic conditions and co-morbidity symptoms are monitored and maintained or improved  2/17/2025 0131 by Jerri Roldan RN  Outcome: Progressing  Note:   CHF Care Plan      Patient's EF (Ejection Fraction) is greater than 40%    Heart Failure Medications:  Diuretics:: None    (One of the following REQUIRED for EF </= 40%/SYSTOLIC FAILURE but MAY be used in EF% >40%/DIASTOLIC FAILURE)        ACE:: None        ARB:: None         ARNI:: None    (Beta Blockers)  NON- Evidenced Based Beta Blocker (for EF% >40%/DIASTOLIC FAILURE): None    Evidenced Based Beta Blocker::(REQUIRED for EF% <40%/SYSTOLIC FAILURE) None  ...................................................................................................................................................    Failed to redirect to the Timeline version of the [employer] SmartLink.      Patient's weights and intake/output reviewed    Daily Weight log at bedside, patient/family participation in use of log: no    Patient's current weight today shows a difference of 6 lbs more than last documented weight.      Intake/Output Summary (Last 24 hours) at 2/17/2025 0131  Last data filed at 2/17/2025 0010  Gross per 24 hour   Intake 2812.46 ml   Output 2125 ml   Net 687.46 ml       Education Booklet Provided: no    Comorbidities Reviewed Yes    Patient has a past medical history of Diabetes mellitus (HCC), Hyperlipidemia, and Hypertension.     >>For CHF and Comorbidity documentation on Education Time and Topics, please see Education Tab      CHF Education    Learners:  Patient  Readineess:   Acceptance  Method:   Explanation  Response:   Needs Reinforcement  Comments: Pt educated on the need to monitor daily wts to determine fluid volume as well as the importance of adhering to a low sodium diet to decrease fluid retention.    Time Spent: 5 min      Pt resting in bed at this time on room air. Pt denies shortness of

## 2025-02-18 LAB
ANA SER QL IA: NEGATIVE
ANION GAP SERPL CALCULATED.3IONS-SCNC: 13 MMOL/L (ref 3–16)
ANION GAP SERPL CALCULATED.3IONS-SCNC: 14 MMOL/L (ref 3–16)
BASOPHILS # BLD: 0 K/UL (ref 0–0.2)
BASOPHILS NFR BLD: 0.3 %
BUN SERPL-MCNC: 79 MG/DL (ref 7–20)
BUN SERPL-MCNC: 80 MG/DL (ref 7–20)
CALCIUM SERPL-MCNC: 8 MG/DL (ref 8.3–10.6)
CALCIUM SERPL-MCNC: 8.2 MG/DL (ref 8.3–10.6)
CHLORIDE SERPL-SCNC: 101 MMOL/L (ref 99–110)
CHLORIDE SERPL-SCNC: 102 MMOL/L (ref 99–110)
CO2 SERPL-SCNC: 20 MMOL/L (ref 21–32)
CO2 SERPL-SCNC: 22 MMOL/L (ref 21–32)
CREAT SERPL-MCNC: 6.7 MG/DL (ref 0.8–1.3)
CREAT SERPL-MCNC: 6.9 MG/DL (ref 0.8–1.3)
DEPRECATED RDW RBC AUTO: 14.4 % (ref 12.4–15.4)
DSDNA AB SER-ACNC: <1 IU/ML (ref 0–9)
ENA RNP AB SER IA-ACNC: <0.2 AI (ref 0–0.9)
ENA SS-A AB SER IA-ACNC: <0.2 AI (ref 0–0.9)
ENA SS-B AB SER IA-ACNC: <0.2 AI (ref 0–0.9)
EOSINOPHIL # BLD: 0.2 K/UL (ref 0–0.6)
EOSINOPHIL NFR BLD: 3.1 %
GFR SERPLBLD CREATININE-BSD FMLA CKD-EPI: 8 ML/MIN/{1.73_M2}
GFR SERPLBLD CREATININE-BSD FMLA CKD-EPI: 8 ML/MIN/{1.73_M2}
GLUCOSE BLD-MCNC: 142 MG/DL (ref 70–99)
GLUCOSE BLD-MCNC: 150 MG/DL (ref 70–99)
GLUCOSE BLD-MCNC: 212 MG/DL (ref 70–99)
GLUCOSE BLD-MCNC: 227 MG/DL (ref 70–99)
GLUCOSE SERPL-MCNC: 107 MG/DL (ref 70–99)
GLUCOSE SERPL-MCNC: 109 MG/DL (ref 70–99)
HCT VFR BLD AUTO: 23.1 % (ref 40.5–52.5)
HGB BLD-MCNC: 7.8 G/DL (ref 13.5–17.5)
INR PPP: 1.12 (ref 0.85–1.15)
LYMPHOCYTES # BLD: 1 K/UL (ref 1–5.1)
LYMPHOCYTES NFR BLD: 12.9 %
MCH RBC QN AUTO: 30.9 PG (ref 26–34)
MCHC RBC AUTO-ENTMCNC: 33.7 G/DL (ref 31–36)
MCV RBC AUTO: 91.6 FL (ref 80–100)
MONOCYTES # BLD: 0.5 K/UL (ref 0–1.3)
MONOCYTES NFR BLD: 7.1 %
NEUTROPHILS # BLD: 5.9 K/UL (ref 1.7–7.7)
NEUTROPHILS NFR BLD: 76.6 %
PERFORMED ON: ABNORMAL
PLATELET # BLD AUTO: 252 K/UL (ref 135–450)
PMV BLD AUTO: 8.3 FL (ref 5–10.5)
POTASSIUM SERPL-SCNC: 4.8 MMOL/L (ref 3.5–5.1)
POTASSIUM SERPL-SCNC: 4.8 MMOL/L (ref 3.5–5.1)
PROTHROMBIN TIME: 14.6 SEC (ref 11.9–14.9)
RBC # BLD AUTO: 2.52 M/UL (ref 4.2–5.9)
SODIUM SERPL-SCNC: 134 MMOL/L (ref 136–145)
SODIUM SERPL-SCNC: 138 MMOL/L (ref 136–145)
WBC # BLD AUTO: 7.7 K/UL (ref 4–11)

## 2025-02-18 PROCEDURE — 2060000000 HC ICU INTERMEDIATE R&B

## 2025-02-18 PROCEDURE — 83521 IG LIGHT CHAINS FREE EACH: CPT

## 2025-02-18 PROCEDURE — 85025 COMPLETE CBC W/AUTO DIFF WBC: CPT

## 2025-02-18 PROCEDURE — 6360000002 HC RX W HCPCS: Performed by: HOSPITALIST

## 2025-02-18 PROCEDURE — 2500000003 HC RX 250 WO HCPCS: Performed by: HOSPITALIST

## 2025-02-18 PROCEDURE — 85610 PROTHROMBIN TIME: CPT

## 2025-02-18 PROCEDURE — 80048 BASIC METABOLIC PNL TOTAL CA: CPT

## 2025-02-18 PROCEDURE — 6360000002 HC RX W HCPCS: Performed by: INTERNAL MEDICINE

## 2025-02-18 PROCEDURE — 6370000000 HC RX 637 (ALT 250 FOR IP): Performed by: INTERNAL MEDICINE

## 2025-02-18 PROCEDURE — 6370000000 HC RX 637 (ALT 250 FOR IP): Performed by: HOSPITALIST

## 2025-02-18 PROCEDURE — 2580000003 HC RX 258: Performed by: INTERNAL MEDICINE

## 2025-02-18 PROCEDURE — 36415 COLL VENOUS BLD VENIPUNCTURE: CPT

## 2025-02-18 RX ADMIN — GABAPENTIN 200 MG: 100 CAPSULE ORAL at 19:59

## 2025-02-18 RX ADMIN — GABAPENTIN 200 MG: 100 CAPSULE ORAL at 09:07

## 2025-02-18 RX ADMIN — INSULIN LISPRO 1 UNITS: 100 INJECTION, SOLUTION INTRAVENOUS; SUBCUTANEOUS at 11:34

## 2025-02-18 RX ADMIN — GABAPENTIN 200 MG: 100 CAPSULE ORAL at 14:42

## 2025-02-18 RX ADMIN — ROSUVASTATIN CALCIUM 5 MG: 10 TABLET, FILM COATED ORAL at 20:00

## 2025-02-18 RX ADMIN — INSULIN LISPRO 1 UNITS: 100 INJECTION, SOLUTION INTRAVENOUS; SUBCUTANEOUS at 20:00

## 2025-02-18 RX ADMIN — SODIUM CHLORIDE, PRESERVATIVE FREE 10 ML: 5 INJECTION INTRAVENOUS at 09:08

## 2025-02-18 RX ADMIN — INSULIN GLARGINE 8 UNITS: 100 INJECTION, SOLUTION SUBCUTANEOUS at 09:07

## 2025-02-18 RX ADMIN — IRON SUCROSE 200 MG: 20 INJECTION, SOLUTION INTRAVENOUS at 11:47

## 2025-02-18 RX ADMIN — EPOETIN ALFA-EPBX 500 UNITS: 2000 INJECTION, SOLUTION INTRAVENOUS; SUBCUTANEOUS at 11:35

## 2025-02-18 RX ADMIN — FOLIC ACID 1 MG: 1 TABLET ORAL at 09:07

## 2025-02-18 RX ADMIN — HYDROXYCHLOROQUINE SULFATE 200 MG: 200 TABLET ORAL at 09:07

## 2025-02-18 RX ADMIN — HEPARIN SODIUM 5000 UNITS: 5000 INJECTION INTRAVENOUS; SUBCUTANEOUS at 04:43

## 2025-02-18 NOTE — PLAN OF CARE
Problem: Chronic Conditions and Co-morbidities  Goal: Patient's chronic conditions and co-morbidity symptoms are monitored and maintained or improved  Outcome: Progressing   Pt educated on the importance of a carb control diet. Monitoring pt's blood glucose AC/HS. Sliding scale insulin given as ordered according to pt's blood glucose. Will continue to monitor.

## 2025-02-18 NOTE — CARE COORDINATION
LOS 4.  Care managed by [facility]  Here w Hyperkalemia, WOLF. From home w spouse and IPTA.  CM following. Sweta Guthrie RN

## 2025-02-19 DIAGNOSIS — E11.43 TYPE 2 DIABETES MELLITUS WITH DIABETIC AUTONOMIC NEUROPATHY, WITHOUT LONG-TERM CURRENT USE OF INSULIN (HCC): ICD-10-CM

## 2025-02-19 LAB
ALBUMIN SERPL-MCNC: 3.2 G/DL (ref 3.4–5)
ANION GAP SERPL CALCULATED.3IONS-SCNC: 12 MMOL/L (ref 3–16)
BASOPHILS # BLD: 0 K/UL (ref 0–0.2)
BASOPHILS NFR BLD: 0.6 %
BUN SERPL-MCNC: 71 MG/DL (ref 7–20)
CALCIUM SERPL-MCNC: 8.4 MG/DL (ref 8.3–10.6)
CHLORIDE SERPL-SCNC: 103 MMOL/L (ref 99–110)
CO2 SERPL-SCNC: 22 MMOL/L (ref 21–32)
CREAT SERPL-MCNC: 5.6 MG/DL (ref 0.8–1.3)
DEPRECATED RDW RBC AUTO: 14.6 % (ref 12.4–15.4)
EOSINOPHIL # BLD: 0.3 K/UL (ref 0–0.6)
EOSINOPHIL NFR BLD: 3.5 %
GFR SERPLBLD CREATININE-BSD FMLA CKD-EPI: 10 ML/MIN/{1.73_M2}
GLUCOSE BLD-MCNC: 128 MG/DL (ref 70–99)
GLUCOSE BLD-MCNC: 163 MG/DL (ref 70–99)
GLUCOSE BLD-MCNC: 209 MG/DL (ref 70–99)
GLUCOSE BLD-MCNC: 255 MG/DL (ref 70–99)
GLUCOSE SERPL-MCNC: 144 MG/DL (ref 70–99)
HCT VFR BLD AUTO: 23.6 % (ref 40.5–52.5)
HGB BLD-MCNC: 8.2 G/DL (ref 13.5–17.5)
KAPPA LC FREE SER-MCNC: 70.2 MG/L (ref 2.37–20.73)
KAPPA LC FREE/LAMBDA FREE SER: 0.68 {RATIO} (ref 0.22–1.74)
LAMBDA LC FREE SERPL-MCNC: 103 MG/L (ref 4.23–27.69)
LYMPHOCYTES # BLD: 0.9 K/UL (ref 1–5.1)
LYMPHOCYTES NFR BLD: 11.3 %
MCH RBC QN AUTO: 31.9 PG (ref 26–34)
MCHC RBC AUTO-ENTMCNC: 34.7 G/DL (ref 31–36)
MCV RBC AUTO: 92 FL (ref 80–100)
MONOCYTES # BLD: 0.5 K/UL (ref 0–1.3)
MONOCYTES NFR BLD: 6.6 %
MYELOPEROXIDASE AB SER-ACNC: 0 AU/ML (ref 0–19)
NEUTROPHILS # BLD: 5.9 K/UL (ref 1.7–7.7)
NEUTROPHILS NFR BLD: 78 %
PERFORMED ON: ABNORMAL
PHOSPHATE SERPL-MCNC: 5.4 MG/DL (ref 2.5–4.9)
PLATELET # BLD AUTO: 268 K/UL (ref 135–450)
PMV BLD AUTO: 8.4 FL (ref 5–10.5)
POTASSIUM SERPL-SCNC: 4.8 MMOL/L (ref 3.5–5.1)
PROTEINASE3 AB SER-ACNC: 0 AU/ML (ref 0–19)
RBC # BLD AUTO: 2.56 M/UL (ref 4.2–5.9)
RPT COMMENT: ABNORMAL
SODIUM SERPL-SCNC: 137 MMOL/L (ref 136–145)
WBC # BLD AUTO: 7.6 K/UL (ref 4–11)

## 2025-02-19 PROCEDURE — 6360000002 HC RX W HCPCS: Performed by: INTERNAL MEDICINE

## 2025-02-19 PROCEDURE — 36415 COLL VENOUS BLD VENIPUNCTURE: CPT

## 2025-02-19 PROCEDURE — 80069 RENAL FUNCTION PANEL: CPT

## 2025-02-19 PROCEDURE — 2060000000 HC ICU INTERMEDIATE R&B

## 2025-02-19 PROCEDURE — 6370000000 HC RX 637 (ALT 250 FOR IP): Performed by: HOSPITALIST

## 2025-02-19 PROCEDURE — 85025 COMPLETE CBC W/AUTO DIFF WBC: CPT

## 2025-02-19 PROCEDURE — 6370000000 HC RX 637 (ALT 250 FOR IP): Performed by: INTERNAL MEDICINE

## 2025-02-19 PROCEDURE — 2500000003 HC RX 250 WO HCPCS: Performed by: HOSPITALIST

## 2025-02-19 PROCEDURE — 2580000003 HC RX 258: Performed by: INTERNAL MEDICINE

## 2025-02-19 RX ADMIN — GABAPENTIN 200 MG: 100 CAPSULE ORAL at 08:53

## 2025-02-19 RX ADMIN — HYDROXYCHLOROQUINE SULFATE 200 MG: 200 TABLET ORAL at 08:53

## 2025-02-19 RX ADMIN — GABAPENTIN 200 MG: 100 CAPSULE ORAL at 21:20

## 2025-02-19 RX ADMIN — INSULIN LISPRO 1 UNITS: 100 INJECTION, SOLUTION INTRAVENOUS; SUBCUTANEOUS at 21:21

## 2025-02-19 RX ADMIN — INSULIN GLARGINE 8 UNITS: 100 INJECTION, SOLUTION SUBCUTANEOUS at 08:53

## 2025-02-19 RX ADMIN — HEPARIN SODIUM 5000 UNITS: 5000 INJECTION INTRAVENOUS; SUBCUTANEOUS at 14:16

## 2025-02-19 RX ADMIN — GABAPENTIN 200 MG: 100 CAPSULE ORAL at 14:16

## 2025-02-19 RX ADMIN — SODIUM CHLORIDE, PRESERVATIVE FREE 10 ML: 5 INJECTION INTRAVENOUS at 21:21

## 2025-02-19 RX ADMIN — IRON SUCROSE 200 MG: 20 INJECTION, SOLUTION INTRAVENOUS at 10:33

## 2025-02-19 RX ADMIN — SODIUM CHLORIDE, PRESERVATIVE FREE 10 ML: 5 INJECTION INTRAVENOUS at 08:54

## 2025-02-19 RX ADMIN — INSULIN LISPRO 2 UNITS: 100 INJECTION, SOLUTION INTRAVENOUS; SUBCUTANEOUS at 11:31

## 2025-02-19 RX ADMIN — HEPARIN SODIUM 5000 UNITS: 5000 INJECTION INTRAVENOUS; SUBCUTANEOUS at 21:20

## 2025-02-19 RX ADMIN — FOLIC ACID 1 MG: 1 TABLET ORAL at 08:53

## 2025-02-19 RX ADMIN — ROSUVASTATIN CALCIUM 5 MG: 10 TABLET, FILM COATED ORAL at 21:19

## 2025-02-20 ENCOUNTER — TELEPHONE (OUTPATIENT)
Dept: INTERNAL MEDICINE CLINIC | Age: 70
End: 2025-02-20

## 2025-02-20 ENCOUNTER — TELEMEDICINE (OUTPATIENT)
Dept: INTERNAL MEDICINE CLINIC | Age: 70
End: 2025-02-20

## 2025-02-20 VITALS
OXYGEN SATURATION: 100 % | BODY MASS INDEX: 29.49 KG/M2 | TEMPERATURE: 98.6 F | WEIGHT: 206 LBS | HEART RATE: 80 BPM | RESPIRATION RATE: 18 BRPM | SYSTOLIC BLOOD PRESSURE: 123 MMHG | DIASTOLIC BLOOD PRESSURE: 86 MMHG | HEIGHT: 70 IN

## 2025-02-20 DIAGNOSIS — N17.9 ACUTE KIDNEY INJURY SUPERIMPOSED ON CKD: Primary | ICD-10-CM

## 2025-02-20 DIAGNOSIS — E11.43 TYPE 2 DIABETES MELLITUS WITH DIABETIC AUTONOMIC NEUROPATHY, WITHOUT LONG-TERM CURRENT USE OF INSULIN (HCC): ICD-10-CM

## 2025-02-20 DIAGNOSIS — N18.9 ACUTE KIDNEY INJURY SUPERIMPOSED ON CKD: Primary | ICD-10-CM

## 2025-02-20 DIAGNOSIS — Z09 HOSPITAL DISCHARGE FOLLOW-UP: ICD-10-CM

## 2025-02-20 DIAGNOSIS — N18.9 ANEMIA OF RENAL DISEASE: ICD-10-CM

## 2025-02-20 DIAGNOSIS — D63.1 ANEMIA OF RENAL DISEASE: ICD-10-CM

## 2025-02-20 DIAGNOSIS — I10 ESSENTIAL HYPERTENSION: ICD-10-CM

## 2025-02-20 PROBLEM — L97.511 ULCER OF RIGHT FOOT, LIMITED TO BREAKDOWN OF SKIN (HCC): Status: RESOLVED | Noted: 2023-12-27 | Resolved: 2025-02-20

## 2025-02-20 PROBLEM — N18.30 STAGE 3 CHRONIC KIDNEY DISEASE, UNSPECIFIED WHETHER STAGE 3A OR 3B CKD (HCC): Status: ACTIVE | Noted: 2025-02-20

## 2025-02-20 LAB
ALBUMIN SERPL-MCNC: 3.4 G/DL (ref 3.4–5)
ANION GAP SERPL CALCULATED.3IONS-SCNC: 13 MMOL/L (ref 3–16)
BASOPHILS # BLD: 0 K/UL (ref 0–0.2)
BASOPHILS NFR BLD: 0.5 %
BUN SERPL-MCNC: 61 MG/DL (ref 7–20)
CALCIUM SERPL-MCNC: 8.8 MG/DL (ref 8.3–10.6)
CHLORIDE SERPL-SCNC: 104 MMOL/L (ref 99–110)
CO2 SERPL-SCNC: 22 MMOL/L (ref 21–32)
CREAT SERPL-MCNC: 4.9 MG/DL (ref 0.8–1.3)
DEPRECATED RDW RBC AUTO: 14.3 % (ref 12.4–15.4)
EOSINOPHIL # BLD: 0.3 K/UL (ref 0–0.6)
EOSINOPHIL NFR BLD: 3.7 %
GFR SERPLBLD CREATININE-BSD FMLA CKD-EPI: 12 ML/MIN/{1.73_M2}
GLUCOSE BLD-MCNC: 117 MG/DL (ref 70–99)
GLUCOSE BLD-MCNC: 247 MG/DL (ref 70–99)
GLUCOSE SERPL-MCNC: 194 MG/DL (ref 70–99)
HCT VFR BLD AUTO: 24.9 % (ref 40.5–52.5)
HGB BLD-MCNC: 8.4 G/DL (ref 13.5–17.5)
LYMPHOCYTES # BLD: 1.3 K/UL (ref 1–5.1)
LYMPHOCYTES NFR BLD: 15.3 %
MCH RBC QN AUTO: 31.4 PG (ref 26–34)
MCHC RBC AUTO-ENTMCNC: 33.6 G/DL (ref 31–36)
MCV RBC AUTO: 93.3 FL (ref 80–100)
MONOCYTES # BLD: 0.6 K/UL (ref 0–1.3)
MONOCYTES NFR BLD: 7.3 %
NEUTROPHILS # BLD: 6.2 K/UL (ref 1.7–7.7)
NEUTROPHILS NFR BLD: 73.2 %
PERFORMED ON: ABNORMAL
PERFORMED ON: ABNORMAL
PHOSPHATE SERPL-MCNC: 4.2 MG/DL (ref 2.5–4.9)
PLATELET # BLD AUTO: 275 K/UL (ref 135–450)
PMV BLD AUTO: 8.5 FL (ref 5–10.5)
POTASSIUM SERPL-SCNC: 4.8 MMOL/L (ref 3.5–5.1)
RBC # BLD AUTO: 2.67 M/UL (ref 4.2–5.9)
SODIUM SERPL-SCNC: 139 MMOL/L (ref 136–145)
WBC # BLD AUTO: 8.4 K/UL (ref 4–11)

## 2025-02-20 PROCEDURE — 6360000002 HC RX W HCPCS: Performed by: INTERNAL MEDICINE

## 2025-02-20 PROCEDURE — 6370000000 HC RX 637 (ALT 250 FOR IP): Performed by: INTERNAL MEDICINE

## 2025-02-20 PROCEDURE — 85025 COMPLETE CBC W/AUTO DIFF WBC: CPT

## 2025-02-20 PROCEDURE — 36415 COLL VENOUS BLD VENIPUNCTURE: CPT

## 2025-02-20 PROCEDURE — 2500000003 HC RX 250 WO HCPCS: Performed by: HOSPITALIST

## 2025-02-20 PROCEDURE — 2580000003 HC RX 258: Performed by: INTERNAL MEDICINE

## 2025-02-20 PROCEDURE — 80069 RENAL FUNCTION PANEL: CPT

## 2025-02-20 PROCEDURE — 6370000000 HC RX 637 (ALT 250 FOR IP): Performed by: HOSPITALIST

## 2025-02-20 RX ORDER — INSULIN GLARGINE 100 [IU]/ML
8 INJECTION, SOLUTION SUBCUTANEOUS DAILY
Qty: 5 ADJUSTABLE DOSE PRE-FILLED PEN SYRINGE | Refills: 0 | Status: SHIPPED | OUTPATIENT
Start: 2025-02-20 | End: 2025-02-20

## 2025-02-20 RX ORDER — ASPIRIN 81 MG/1
81 TABLET ORAL DAILY
Qty: 30 TABLET | Refills: 0 | Status: SHIPPED | OUTPATIENT
Start: 2025-02-20

## 2025-02-20 RX ORDER — ACYCLOVIR 400 MG/1
TABLET ORAL
Qty: 4 EACH | Refills: 0 | Status: SHIPPED | OUTPATIENT
Start: 2025-02-20

## 2025-02-20 RX ORDER — GLIMEPIRIDE 2 MG/1
TABLET ORAL
Qty: 60 TABLET | Refills: 0 | Status: SHIPPED | OUTPATIENT
Start: 2025-02-20

## 2025-02-20 RX ORDER — METOPROLOL SUCCINATE 25 MG/1
25 TABLET, EXTENDED RELEASE ORAL DAILY
Qty: 30 TABLET | Refills: 0 | Status: SHIPPED | OUTPATIENT
Start: 2025-02-20

## 2025-02-20 RX ADMIN — SODIUM CHLORIDE, PRESERVATIVE FREE 10 ML: 5 INJECTION INTRAVENOUS at 08:45

## 2025-02-20 RX ADMIN — HYDROXYCHLOROQUINE SULFATE 200 MG: 200 TABLET ORAL at 08:44

## 2025-02-20 RX ADMIN — IRON SUCROSE 200 MG: 20 INJECTION, SOLUTION INTRAVENOUS at 11:21

## 2025-02-20 RX ADMIN — FOLIC ACID 1 MG: 1 TABLET ORAL at 08:44

## 2025-02-20 RX ADMIN — GABAPENTIN 200 MG: 100 CAPSULE ORAL at 08:44

## 2025-02-20 RX ADMIN — HEPARIN SODIUM 5000 UNITS: 5000 INJECTION INTRAVENOUS; SUBCUTANEOUS at 06:22

## 2025-02-20 RX ADMIN — INSULIN LISPRO 1 UNITS: 100 INJECTION, SOLUTION INTRAVENOUS; SUBCUTANEOUS at 11:19

## 2025-02-20 RX ADMIN — INSULIN GLARGINE 8 UNITS: 100 INJECTION, SOLUTION SUBCUTANEOUS at 08:45

## 2025-02-20 NOTE — DISCHARGE SUMMARY
ischemic evaluation was recommended in lieu of medical management and close follow-up. He has not been compliant with follow-up or medical management.  ECHO 2/17/25 showed LVEF 20-25%. Strongly recommended close Cardiology follow-up pending renal recovery as Angiogram may be indicated at this point.  Continue holding ARB due to ongoing renal issues but resume Toprol XL.     Rheumatoid arthritis - pt states he has not been taking voltaren in over a year - he is on plaquenil and gabapentin - will reduce dose of gabapentin to adjust for kidney failure - pt was on 800mg daily and now down to 200mg tid - would continue to try and decrease per patient request    DM2 with neuropathy - gabapentin at lower dose - lantus and low dose SSI for now - hold glipizide, metformin - controlled at present    HTN - monitor - holding meds for now - has prn hydralazine available - stable on no medications      Ongoing threat to life and/or bodily function without ongoing treatment due to:  WOLF    Consults:    Nephrology consulted and appreciated.  Available consultant notes from yesterday and today were reviewed on 2/20/2025, w/ plan for continued inpatient w/up and management - As above.     IP CONSULT TO NEPHROLOGY  IP CONSULT TO HOSPITALIST  IP CONSULT TO HOSPITALIST  IP CONSULT TO NEPHROLOGY        --------------------------------------------------      Medications:         Infusion Medications    dextrose      sodium chloride      sodium chloride       Scheduled Medications    insulin glargine  8 Units SubCUTAneous Daily    rosuvastatin  5 mg Oral Nightly    folic acid  1 mg Oral Daily    insulin lispro  0-4 Units SubCUTAneous 4x Daily AC & HS    hydroxychloroquine  200 mg Oral Daily    gabapentin  200 mg Oral TID    sodium chloride flush  5-40 mL IntraVENous 2 times per day    heparin (porcine)  5,000 Units SubCUTAneous 3 times per day     PRN Meds: fluticasone, sodium chloride, glucose, dextrose bolus **OR** dextrose bolus,

## 2025-02-20 NOTE — PROGRESS NOTES
Hospitalist Progress Note    CC: Hyperkalemia    Name:  Ant Carrasquillo /Age/Sex: 1955  (69 y.o. male)   MRN & CSN:  [id number] & [id number] Encounter Date/Time: 2/15/2025 7:25 AM EST   Location:   PCP: Minnie Coelho MD     Attending:Franny Mckee MD         Hospital course:  69-year-old gentleman past medical history significant for diabetes and chronic kidney disease presented to the hospital with nausea vomiting and not eating and drinking well for 3 days plus.  Patient at home with an angiotensin receptor blocker and NSAIDS  In the ED, he is found to have significant acute kidney injury (Crt 8.7) and severe hyperkalemia (7.3) along with severe acidosis. He was given lokelma by ED physician and nephrology was consulted.  EKG showed no acute ST T wave changes.  Nephrology to begin sodium bicarbonate drip. They requested krishna catheter and CAT scan of the abdomen and they plan to recheck labs in a few hours and they want the patient admitted to the ICU.  Creatinine did improve as well as potassium to 5.9 and pt did not require admission to the ICU. Lactic acid is improving.  Pt does have elevated beta hydroxybutyrate and was vomiting at home - likely elevated from that and starvation rather than DKA.  Hgb is 6.9 likely anemia of CKD - pt agreeable to transfusion.    Admit date: 2025  Days in hospital:  Hospital Day: 2  Status:  Inpatient       24 Hour Events: pt feels fair today    Subjective: slightly better than on presentation - pt did have about 330ml of urine - continues on a bicarb drip - BP borderline low  Hgb is 6.9 - likely anemia of CKD - he is agreeable to transfusion    ROS:   A comprehensive review of systems was negative except for: denies pain or shortness of breath       Objective:    /72   Pulse 77   Temp 97.5 °F (36.4 °C) (Oral)   Resp 18   Ht 1.765 m (5' 9.5\")   Wt 94.7 kg (208 lb 11.2 oz)   SpO2 
                                                    Hospitalist Progress Note    CC: Hyperkalemia    Name:  Ant Carrasquillo /Age/Sex: 1955  (69 y.o. male)   MRN & CSN:  [id number] & [id number] Encounter Date/Time: 2025 7:25 AM EST   Location:   PCP: Minnie Coelho MD     Attending:Franny Mckee MD         Hospital course:  69-year-old gentleman past medical history significant for diabetes and chronic kidney disease presented to the hospital with nausea vomiting and not eating and drinking well for 3 days plus.  Patient at home with an angiotensin receptor blocker and NSAIDS  In the ED, he is found to have significant acute kidney injury (Crt 8.7) and severe hyperkalemia (7.3) along with severe acidosis. He was given lokelma by ED physician and nephrology was consulted.  EKG showed no acute ST T wave changes.  Nephrology to begin sodium bicarbonate drip. They requested krishna catheter and CAT scan of the abdomen and they plan to recheck labs in a few hours and they want the patient admitted to the ICU.  Creatinine did improve as well as potassium to 5.9 and pt did not require admission to the ICU. Lactic acid is improving.  Pt does have elevated beta hydroxybutyrate and was vomiting at home - likely elevated from that and starvation rather than DKA.  Hgb is 6.9 likely anemia of CKD - pt agreeable to transfusion.  Pt has hx of CHF chronic diastolic with EF now improved to 50% from 35% in .    Admit date: 2025  Days in hospital:  Hospital Day: 3  Status:  Inpatient       24 Hour Events: pt feels fair today    Subjective: creatinine not improving as much as I hoped - suspect pt kidney function has been deteriorating for some time - has not been checked in a year - potassium within normal range (high normal)  Pt did receive 1u pRBC yesterday - hgb 7.2 today - continue to monitor closely - suspect due to chronic renal disease  He has put on 6 lbs since admission    ROS:   A 
                          Patient ID: Ant Carrasquillo  Referring/ Physician: Franny Mckee MD      Summary:   Ant Carrasquillo is being seen by nephrology for WOLF.     Reason for admission: Vomiting      Interval Hx:     Seen at bedside.   /80  2525 mL urine yesterday  Off IVF now.  Cr only slightly improved from 7.9 to 7.6 today  Electrolytes are ok  Hb 7.9        Assessment/Plan:   -WOLF only slightly improved since admission. UA shows active urinary sediment with RBC casts  - check ALEXANDRA, ANCA, complement 3 and 4, DS DNA, TRACE panel  - check UPCR and UACR      Acute kidney injury  He has some baseline chronic kidney disease his last creatinine was 1.5-1.9  He had an WOLF back in December 2023 when his creatinine was in the high threes.  Now his creatinine is 8.7 with a BUN of 100 and he is coming in with signs of volume depletion diarrhea vomiting.  He was also taking Voltaren.  UACR was 92 mg/g, UPCR 0.8 g/g in 1/2024  He does have a history of diabetes and some albuminuria.  CT showed small bilateral effusions but no acute abnormalities of the kidneys no hydronephrosis or hydroureter but he did have bilateral nephrolithiasis.  -UA with 1-3 RBC casts, coarse casts, 12-50 RBC  -check serologies for a possible GN    Hyperkalemia  Severe with a potassium over 7  Coming down with medical treatment  Follow-up last potassium was 5.9  [facility]    History of hypertension  Blood pressure low on admission with prior to admission he was on losartan 25 mg daily and metoprolol 25 mg daily  Hold antihypertensives giving IV fluids for low blood pressure      [facility] would like to thank you for the opportunity to serve this patient. Please call with any questions or concerns.    Brittany Cevallos MD  [facility]  [address] [place] [address]  Fax: [phone]  Office: [phone]    HPI  Ant Carrasquillo is a 69 y.o. male  with  has a past medical history of Diabetes 
                          Patient ID: Ant Carrasquillo  Referring/ Physician: Franny Mckee MD      Summary:   Ant Carrasquillo is being seen by nephrology for WOLF.     Reason for admission: Vomiting      Interval Hx:     Seen at bedside.   Awake and alert   No edema or SOB.   Has a krishna and making some urine.   Taking PO.     On room air   /72   cc past day   + 3.3 L for admission.       K 4.8   Na 134  Bicarb 20   BUN 92   CR 7.9        Assessment/Plan:   -WOLF slowly improving.   -no acute electrolyte issues and no uremic sxs.   - no dialysis plans today   -can stop bicarb fluids.     - strict IO.       Acute kidney injury  He has some baseline chronic kidney disease his last creatinine was 1.6  He had an WOLF back in December 2023 when his creatinine was in the high threes.  Now his creatinine is 8.7 with a BUN of 100 and he is coming in with signs of volume depletion diarrhea vomiting.  He was also taking Voltaren.  His albumin creatinine ratio was 92 mg, no urinalysis was sent  He does have a history of diabetes and some albuminuria.  Continue with IV fluids  Maintain Krishna catheter  CT showed small bilateral effusions but no acute abnormalities of the kidneys no hydronephrosis or hydroureter but he did have bilateral nephrolithiasis.   -Will follow-up the UA and if there is any active urine sediment we will consider sending serologies but seems like this is a prerenal kidney injury as of now.    Hyperkalemia  Severe with a potassium over 7  Coming down with medical treatment  Follow-up last potassium was 5.9  [facility]    History of hypertension  Blood pressure low on admission with prior to admission he was on losartan 25 mg daily and metoprolol 25 mg daily  Hold antihypertensives giving IV fluids for low blood pressure      [facility] would like to thank you for the opportunity to serve this patient. Please call with any questions or concerns.    Tomasa Jimenez MD  [facility] 
                 Summary:   Ant Carrasquillo is being seen by nephrology for WOLF.     Reason for admission: Vomiting      Interval Hx:     He has hematuria but also has So  Will need to check/follow hematuria as an outpatient and if persist will need cystoscopy  Otherwise blood pressure is under contro  l made 4.5 L of urine yesterday and creatinine coming down significantly since yesterday    Rheumatoid factor was very high more than > 650  on 3/5/2024, hepatitis B   And C- from the past, C3 and C4 normal from this admission    Has RA- followed rheumatology         Assessment/Plan:     There was a concern about possible glomerulonephritis   Especially with the fact that he was having rheumatoid arthritis  However he is behaving like ATN with faster recovery of renal function now and making a lot of urine  The recovery trend is suggestive of polyuric phase of ATN  Therefore biopsy was canceled today because of the risks of biopsy would be higher than the benefit in presence of ATN\  However will follow labs    Will need appointment with PCP within a week upon DC  Follow up with me in few weeks   Seen in room with wife by bedside         Acute kidney injury-likely acute tubular necrosis  He has some baseline chronic kidney disease his last creatinine was 1.5-1.9  He had an WOLF back in December 2023 when his creatinine was in the high threes.  Now his creatinine is 8.7 with a BUN of 100 and he is coming in with signs of volume depletion diarrhea vomiting.  He was also taking Voltaren.  UACR was 92 mg/g, UPCR 0.8 g/g in 1/2024  He does have a history of diabetes and some albuminuria.  CT showed small bilateral effusions but no acute abnormalities of the kidneys no hydronephrosis or hydroureter but he did have bilateral nephrolithiasis.  -UA with 1-3 RBC casts, coarse casts, 12-50 RBC  -check serologies for a possible GN    Rheumatoid factor was very high more than 6.2 on 3/5/2024, hepatitis B   And C- from the 
                 Summary:   Ant Carrasquillo is being seen by nephrology for WOLF.     Reason for admission: Vomiting      Interval Hx:   Feels good  Still has krishna  Creatinine getting much better now  Blood pressure is good  Has no complaints  No edema      Assessment/Plan:   Okay to discharge from renal perspective, when okay with other specialties and primary team  Asked to avoid over-the-counter pain medicine  Also he will be seeing PCP within 7 days  I will also see him in the office        Acute kidney injury-likely acute tubular necrosis  He has some baseline chronic kidney disease his last creatinine was 1.5-1.9  He had an WOLF back in December 2023 when his creatinine was in the high threes.  Now his creatinine is 8.7 with a BUN of 100 and he is coming in with signs of volume depletion diarrhea vomiting.  He was also taking Voltaren.  UACR was 92 mg/g, UPCR 0.8 g/g in 1/2024  He does have a history of diabetes and some albuminuria.  CT showed small bilateral effusions but no acute abnormalities of the kidneys no hydronephrosis or hydroureter but he did have bilateral nephrolithiasis.  -UA with 1-3 RBC casts, coarse casts, 12-50 RBC  -check serologies for a possible GN    Rheumatoid factor was very high more than 6.2 on 3/5/2024, hepatitis B   And C- from the past, C3 and C4 normal from this admission      Also significant CKD at baseline with creatinine of 1.5-EGFR of 50 mL/min  Known to have septic shock in 2023  Also has diabetes mellitus    ANCA-ve, ALEXANDRA -ve   K/L ratio  0.68, nd high L, but not significant  https://www.nature.com/articles/[email or web address]        Hyperkalemia  Severe with a potassium over 7  Coming down with medical treatment  Follow-up last potassium was 5.9  Getting Lokelma    History of hypertension  Blood pressure low on admission with prior to admission he was on losartan 25 mg daily and metoprolol 25 mg daily  Hold antihypertensives giving IV fluids for low blood pressure      [facility]t 
  [facility] Medicine Progress Note  V 10.25      Date of Admission: 2/14/2025  Hospital Day: 6    Chief Admission Complaint:  N/V    Subjective:  Appetite stable. No further N/V. Cancelled renal biopsy.     Presenting Admission History:       69-year-old gentleman past medical history significant for diabetes and chronic kidney disease presented to the hospital with nausea vomiting and not eating and drinking well for 3 days plus.  Patient at home with an angiotensin receptor blocker and NSAIDS  In the ED, he is found to have significant acute kidney injury (Crt 8.7) and severe hyperkalemia (7.3) along with severe acidosis. He was given lokelma by ED physician and nephrology was consulted.  EKG showed no acute ST T wave changes.  Nephrology to begin sodium bicarbonate drip. They requested krishna catheter and CAT scan of the abdomen and they plan to recheck labs in a few hours and they want the patient admitted to the ICU.  Creatinine did improve as well as potassium to 5.9 and pt did not require admission to the ICU. Lactic acid is improving.  Pt does have elevated beta hydroxybutyrate and was vomiting at home - likely elevated from that and starvation rather than DKA.  Hgb is 6.9 likely anemia of CKD - pt agreeable to transfusion.  Pt has hx of CHF chronic diastolic with EF now improved to 50% from 35% in 2023.    Assessment/Plan:      Acute renal failure - improving from admission - creatinine peaked at 8.7, slow to improve. Discussed with Nephrology; as his renal function is now improving more quickly, this most likely indicates ATN and thus renal biopsy no longer indicated.  Monitor.     Hyperkalemia - improved - potassium stable     Anemia of chronic disease and iron deficiency - s/p 1u pRBC. Continue Venofer per Nephrology.     Chronic systolic and diastolic CHF - holding ARB - pt needs volume management closely - now off of IVF. Historically has had EF as low as 35-40% but this improved to 50% on most recent 
  Hospital Medicine Progress Note  V 10.25      Date of Admission: 2/14/2025  Hospital Day: 5    Chief Admission Complaint:  N/V    Subjective:  Appetite improving. No N/V. Agreeable to Renal Biopsy.     Presenting Admission History:       69-year-old gentleman past medical history significant for diabetes and chronic kidney disease presented to the hospital with nausea vomiting and not eating and drinking well for 3 days plus.  Patient at home with an angiotensin receptor blocker and NSAIDS  In the ED, he is found to have significant acute kidney injury (Crt 8.7) and severe hyperkalemia (7.3) along with severe acidosis. He was given lokelma by ED physician and nephrology was consulted.  EKG showed no acute ST T wave changes.  Nephrology to begin sodium bicarbonate drip. They requested krishna catheter and CAT scan of the abdomen and they plan to recheck labs in a few hours and they want the patient admitted to the ICU.  Creatinine did improve as well as potassium to 5.9 and pt did not require admission to the ICU. Lactic acid is improving.  Pt does have elevated beta hydroxybutyrate and was vomiting at home - likely elevated from that and starvation rather than DKA.  Hgb is 6.9 likely anemia of CKD - pt agreeable to transfusion.  Pt has hx of CHF chronic diastolic with EF now improved to 50% from 35% in 2023.    Assessment/Plan:      Acute renal failure - improving from admission - creatinine peaked at 8.7, but only improved to 6.9. Discussed with Nephrology; recommends renal biopsy and patient agreeable. Monitor.     Hyperkalemia - improved - potassium stable     Anemia of chronic disease and iron deficiency - s/p 1u pRBC -hgb is 7.9. Start Venofer per Nephrology.     Chronic systolic and diastolic CHF - holding ARB - pt needs volume management closely - now off of IVF. Historically has had EF as low as 35-40% but this improved to 50% on most recent ECHO 2/27/2024. Stress testing was mildly abnormal but no further 
4 Eyes Skin Assessment     NAME:  Ant Carrasquillo  YOB: 1955  MEDICAL RECORD NUMBER:  [id number]    The patient is being assessed for  Admission    I agree that at least one RN has performed a thorough Head to Toe Skin Assessment on the patient. ALL assessment sites listed below have been assessed.      Areas assessed by both nurses:    Head, Face, Ears, Shoulders, Back, Chest, Arms, Elbows, Hands, Sacrum. Buttock, Coccyx, Ischium, Legs. Feet and Heels, and Under Medical Devices         Does the Patient have a Wound? 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    
69-year-old gentleman past medical history significant for diabetes and chronic kidney disease presented to the hospital with nausea vomiting and not eating and drinking well for 3 days plus.  Patient at home with an angiotensin receptor blocker and nonsteroidals for the med rec.  Patient is making less urine but has made urine even this morning.  Patient at the time of presentation is found to have significant acute kidney injury and hyperkalemia along with severe acidosis.  He does not seem to be uremic or according to the ER physician he is able to mentate well.  EKG does not seem to have much of a changes.  Given the fact that he is making urine we will try to get him to have the electrolyte stabilization with resuscitation and see what he does.  Will give him total of 150 mEq of bicarb.  Place him on bicarb containing fluids.  Check a beta hydroxybutyrate.  Rest of the potassium cocktail has already been given by the ER team.  Will get a So catheter in place to see how much urine he is making.  Will get a CAT scan just to make sure there is no obstruction.  Will get a repeat renal panel and see what the potassium looks like and then make a decision based on that.  Very low threshold for initiation of renal replacement therapy.  Please do not hesitate to contact us with any questions or concerns.  Edwin Parrish MD  
Blood transfusion complete.  Patient tolerated well. No complaints at this time.  
Discontinued contact plus precautions due to patient not meeting requirements of isolation and order expiring.   
Labs reviewed  Creatinine getting better significantly since yesterday and has polyuria  With urine output of 4.5 L this is suggesting that he is on recovery phase of ATN  At this point I do not think that biopsy is needed therefore we will hold off on that  She has the orders  Discussed with RN  Will be seeing patient in the afternoon  Discussed with Dr Goetz .   
Patient had another loose stool but did not meet criteria to send for C.diff.  
Patient had loose stool but did not meet criteria to send for C.diff.   
Pt d/c'd home in private vehicle.  Removed PIV and stopped bleeding.  Catheter intact. Pt tolerated well. No redness noted at site.  Notified [facility] and removed tele box. Reviewed d/c instructions, home meds, and  f/u information utilizing teach-back method.  Scripts for medications filled by outpatient pharmacy and given to patient. Patient verbalized understanding.      
Reviewed biopsy case with mecca Lane to do biopsy after holding SQ heparin for 24 hours. Plan to complete kidney biopsy Wed, please hold heparin and NPO at midnight. Primary RN made aware.   
Shift change, bedside report given to Gee GUDINO. Pt exhibits no s/s of distress. Call light in reach. Care has been transferred at this time. Lab value of 6.9 hemoglobin was relayed. Doctor is aware, by proof of placing orders already for PRBC    
So removed at this time with no complications. 10mL deflated from the balloon.Instructed patient to void within 6 hours. Urinal provided.  
Stool sample obtained from pt.  Sample does not meet criteria to submit for C.diff.  
kidneys no hydronephrosis or hydroureter but he did have bilateral nephrolithiasis.  -UA with 1-3 RBC casts, coarse casts, 12-50 RBC  -check serologies for a possible GN    Rheumatoid factor was very high more than 6.2 on 3/5/2024, hepatitis B   And C- from the past, C3 and C4 normal from this admission      Also significant CKD at baseline with creatinine of 1.5-EGFR of 50 mL/min  Known to have septic shock in 2023  Also has diabetes mellitus      Hyperkalemia  Severe with a potassium over 7  Coming down with medical treatment  Follow-up last potassium was 5.9  Getting [facility]    History of hypertension  Blood pressure low on admission with prior to admission he was on losartan 25 mg daily and metoprolol 25 mg daily  Hold antihypertensives giving IV fluids for low blood pressure      [facility] would like to thank you for the opportunity to serve this patient. Please call with any questions or concerns.    [facility]  [address] [place] [address]  Fax: [phone]  Office: [phone]    [facility]  Ant Carrasquillo is a 69 y.o. male  with  has a past medical history of Diabetes mellitus (HCC), Hyperlipidemia, and Hypertension. who presented with nausea vomiting.  Patient reports for the past 4 days he has been having nausea vomiting and diarrhea.  He has had some lightheadedness and dizziness.  He had low blood pressure at home and said he was not taking his ARB for the past couple days.  He has used NSAIDs.  He was found to have significant renal failure in the ER with a creatinine of 8.7 and hyperkalemia 7.3.  He says he was eating bananas because they were the only thing that would stay down.  He had a So catheter placed and has had some yellow urine return, about 300 cc.  He was started on bicarb drip overnight.  He has had no abdominal pain no flank pain no blood in the urine no foamy frothy urine.  He has not noticed any changes in his urine output over the 
Daily AC & HS    hydroxychloroquine  200 mg Oral Daily    gabapentin  200 mg Oral TID    sodium chloride flush  5-40 mL IntraVENous 2 times per day    heparin (porcine)  5,000 Units SubCUTAneous 3 times per day     PRN Meds: sodium chloride, glucose, dextrose bolus **OR** dextrose bolus, glucagon (rDNA), dextrose, sodium chloride, hydrALAZINE, ondansetron, sodium chloride flush, sodium chloride, ondansetron **OR** ondansetron, polyethylene glycol, acetaminophen **OR** acetaminophen     Labs:  Personally reviewed and interpreted for clinical significance.     CBC:   Recent Labs     02/15/25  0632 02/15/25  1420 02/16/25  0133 02/17/25  0442   WBC 5.5  --  6.6 9.7   HGB 6.9* 7.1* 7.2* 7.9*   HCT 20.3* 20.8* 21.1* 23.1*   MCV 94.1  --  91.2 91.9     --  235 252     BMP, Mag, Phos:    Recent Labs     02/14/25  2030 02/15/25  0302 02/16/25  1841 02/17/25  0108 02/17/25  0442   *   < > 134* 135* 136   K 7.3*  7.3*   < > 4.8 4.9 4.9      < > 98* 100 101   CO2 9*   < > 21 20* 19*   *   < > 92* 91* 89*   CREATININE 8.7*   < > 8.1* 7.9* 7.6*   CALCIUM 8.9   < > 8.0* 8.1* 8.3   PHOS 6.5*  --   --   --   --     < > = values in this interval not displayed.     Pro-BNP, Trop:    Recent Labs     02/14/25 2030 02/14/25  2113   PROBNP 48,151*  --    TROPHS 787* 725*     LFTs:    Recent Labs     02/14/25  2030 02/15/25  0632   AST 27 15   ALT 24 22   BILITOT <0.2 <0.2   ALKPHOS 97 81     PT/INR, Lactic acid, TSH:    Recent Labs     02/15/25  0632   INR 1.14     HgbA1c:    Recent Labs     02/15/25  0632   LABA1C 5.9     Glucose trend:  No results found for: \"GLU\"    UA:  Recent Labs     02/15/25  0840   COLORU Yellow   PHUR 5.5   WBCUA 10-20*   RBCUA 21-50*   CLARITYU Clear   LEUKOCYTESUR SMALL*   UROBILINOGEN 0.2   BILIRUBINUR Negative   BLOODU LARGE*   GLUCOSEU Negative     Urine Cultures: No results found for: \"LABURIN\"  Blood Cultures:   Lab Results   Component Value Date/Time    BC No Growth after 4

## 2025-02-20 NOTE — CARE COORDINATION
Discharge ordered, no needs noted.  Pt from home with spouse, IPTA.  Please notify writer if discharge needs arise.  LAURITA Yao-TERESE

## 2025-02-20 NOTE — TELEPHONE ENCOUNTER
Care Transitions Initial Follow Up Call    Outreach made within 2 business days of discharge: Yes    Patient: Ant Carrasquillo Patient : 1955   MRN: [id number]  Reason for Admission: nausea, vomiting, hyperkalemia, renal failure  Discharge Date: 2025       Spoke with: patient    Discharge department/facility: [facility] Interactive Patient Contact:  Was patient able to fill all prescriptions: Yes  Was patient instructed to bring all medications to the follow-up visit: Yes  Is patient taking all medications as directed in the discharge summary? Yes  Does patient understand their discharge instructions: Yes  Does patient have questions or concerns that need addressed prior to 7-14 day follow up office visit: no    Additional needs identified to be addressed with provider  No needs identified             Scheduled appointment with PCP within 7-14 days    Follow Up  Future Appointments   Date Time Provider Department Center   2025 10:10 AM [facility]       Almaz Zuleta MA  Follow up visit with  today. Virtual

## 2025-02-20 NOTE — PLAN OF CARE
Problem: Chronic Conditions and Co-morbidities  Goal: Patient's chronic conditions and co-morbidity symptoms are monitored and maintained or improved  2/20/2025 1136 by Mariah Gross, RN  Outcome: Progressing  Patient with diagnosis of Diabetes. Active orders for ACHS glucose monitoring, SSI and Lantus. Instructed importance of following carb control diet to maintain stable gluocose levels.         Problem: Safety - Adult  Goal: Free from fall injury  2/20/2025 1136 by Mariah Gross, RN  Outcome: Progressing   Pt high fall risk. Instructed to use call light before getting out of bed. Call light within reach. Bed in low position. Bed alarm on.  Will continue to monitor.

## 2025-02-20 NOTE — PLAN OF CARE
Problem: Chronic Conditions and Co-morbidities  Goal: Patient's chronic conditions and co-morbidity symptoms are monitored and maintained or improved  2/20/2025 0154 by Dejuan Perdomo RN  Outcome: Progressing     Problem: Discharge Planning  Goal: Discharge to home or other facility with appropriate resources  2/20/2025 0154 by Dejuan Perdomo RN  Outcome: Progressing     Problem: Safety - Adult  Goal: Free from fall injury  2/20/2025 0154 by Dejuan Perdomo RN  Outcome: Progressing

## 2025-02-20 NOTE — PLAN OF CARE
Problem: Chronic Conditions and Co-morbidities  Goal: Patient's chronic conditions and co-morbidity symptoms are monitored and maintained or improved  2/20/2025 0152 by Dejuan Perdomo RN  Outcome: Progressing     Problem: Discharge Planning  Goal: Discharge to home or other facility with appropriate resources  Outcome: Progressing     Problem: Safety - Adult  Goal: Free from fall injury  Outcome: Progressing

## 2025-02-20 NOTE — PLAN OF CARE
Problem: Chronic Conditions and Co-morbidities  Goal: Patient's chronic conditions and co-morbidity symptoms are monitored and maintained or improved  2/20/2025 1432 by Mariah Gross RN  Outcome: Adequate for Discharge  2/20/2025 1136 by Mariah Gross RN  Outcome: Progressing  2/20/2025 0154 by Dejuan Perdomo RN  Outcome: Progressing  2/20/2025 0152 by Dejuan Perdomo RN  Outcome: Progressing     Problem: Discharge Planning  Goal: Discharge to home or other facility with appropriate resources  2/20/2025 1432 by Mariah Gross RN  Outcome: Adequate for Discharge  2/20/2025 0154 by Dejuan Perdomo RN  Outcome: Progressing  2/20/2025 0152 by Dejuan Perdomo RN  Outcome: Progressing     Problem: ABCDS Injury Assessment  Goal: Absence of physical injury  Outcome: Adequate for Discharge     Problem: Safety - Adult  Goal: Free from fall injury  2/20/2025 1432 by Mariah Gross RN  Outcome: Adequate for Discharge  2/20/2025 1136 by Mariah Gross RN  Outcome: Progressing  2/20/2025 0154 by Dejuan Perdomo RN  Outcome: Progressing  2/20/2025 0152 by Dejuan Perdomo RN  Outcome: Progressing     Problem: Pain  Goal: Verbalizes/displays adequate comfort level or baseline comfort level  Outcome: Adequate for Discharge

## 2025-02-20 NOTE — PROGRESS NOTES
Post-Discharge Transitional Care Follow Up      Ant Carrasquillo   YOB: 1955    Date of Office Visit:  2/20/2025  Date of Hospital Admission: 2/14/24  Date of Hospital Discharge: 2/14/24  Readmission Risk Score (high >=14%. Medium >=10%):Readmission Risk Score: 15.5      Care management risk score Rising risk (score 2-5) and Complex Care (Scores >=6): No Risk Score On File     Non face to face  following discharge, date last encounter closed (first attempt may have been earlier): *No documented post hospital discharge outreach found in the last 14 days     Call initiated 2 business days of discharge: *No response recorded in the last 14 days     Acute kidney injury superimposed on CKD  Type 2 diabetes mellitus with diabetic autonomic neuropathy, without long-term current use of insulin (HCC)  Start glimepiride (AMARYL) 2 MG tablet; 1/2 po twice a day and if sugar is higher than 150, increase by 1/2 pill every week up to 1 bid, Disp-60 tablet, R-0Normal  -     Continuous Glucose Sensor (DEXCOM G7 SENSOR) MISC; Replace every 10 days, Disp-4 each, R-0Normal  Essential hypertension  Anemia of renal disease  Hospital discharge follow-up  -     [place] DISCHARGE MEDS RECONCILED W/ CURRENT OUTPATIENT MED LIST      Medical Decision Making: moderate complexity  Return Diabetes (NO HgA1C) 3/10.    On this date 2/20/2025 I have spent 30 minutes reviewing previous notes, test results and face to face with the patient discussing the diagnosis and importance of compliance with the treatment plan as well as documenting on the day of the visit.       Subjective:   HPI    Inpatient course: Discharge summary reviewed- see chart.    Interval history/Current status: stable    He had been having diarrhea with nausea vomitting so he went to the ER since he was very weak and couldn't walk.  His creatinine was high and anemic so he had a transfusion in the hospital.  He has follow up with nephrologist coming up.  He's off

## 2025-02-21 ENCOUNTER — CARE COORDINATION (OUTPATIENT)
Dept: CASE MANAGEMENT | Age: 70
End: 2025-02-21

## 2025-02-21 NOTE — CARE COORDINATION
Care Transitions Note    Initial Call - Call within 2 business days of discharge: Yes    Attempted to reach patient for transitions of care follow up. Unable to reach patient.    Outreach Attempts:   HIPAA compliant voicemail left for patient.     Patient: Ant Carrasquillo    Patient : 1955   MRN: [id number]    Reason for Admission: Nausea/Vomiting/Diarrhea  Discharge Date: 25  RURS: Readmission Risk Score: 15.5    Last Discharge Facility       Date Complaint Diagnosis Description Type Department Provider    25 Vomiting Nausea vomiting and diarrhea ... ED to Hosp-Admission (Discharged) (ADMITTED) Mathew Villegas MD; TASHA Cristina.            Patient has hospital follow up appointment scheduled within 7 days of discharge.    Future Appointments         Provider Specialty Dept Phone    3/10/2025 1:10 PM Minnie Coelho MD Internal Medicine [phone]    2025 10:10 AM Minnie Coelho MD Internal Medicine [phone]            Plan for follow-up on next business day.      Bertha Sandoval RN

## 2025-02-24 ENCOUNTER — CARE COORDINATION (OUTPATIENT)
Dept: CASE MANAGEMENT | Age: 70
End: 2025-02-24

## 2025-02-24 NOTE — CARE COORDINATION
Care Transitions Note    Initial Call - Call within 2 business days of discharge: Yes    Patient Current Location:  Home: [address] [place] [id number]    Care Transition Nurse contacted the patient by telephone to perform post hospital discharge assessment, verified name and  as identifiers. Provided introduction to self, and explanation of the Care Transition Nurse role.     Patient: Ant Carrasquillo    Patient : 1955   MRN: [id number]    Reason for Admission: Nausea/Vomiting/Diarrhea   Discharge Date: 25  RURS: Readmission Risk Score: 15.5      Last Discharge Facility       Date Complaint Diagnosis Description Type Department Provider    25 Vomiting Nausea vomiting and diarrhea ... ED to Hosp-Admission (Discharged) (ADMITTED) Mathew Villegas MD; TASHA Cristina.            Was this an external facility discharge? No    Additional needs identified to be addressed with provider   No needs identified         Method of communication with provider: none.    Patients top risk factors for readmission: functional physical ability    Interventions to address risk factors:   Review of patient management of conditions/medications:      Care Summary Note: Patient answered call and verified . Patient agreeable to transition call. Patient is feeling \"pretty good\". Confirmed he has AVS and already spoken to PCP post discharge via virtual visit. Medications were changed. Patient is not taking insulin, but instead taking glimepiride. Checking glucose - CGM dexcom monitoring d/t recent hypoglycemia episode.   Patient stated he has \"Knot where blood was drawn and some soreness\". Denied any drainage or pain. Patient to continue to monitor.   Denied any acute needs at present time.  Agreeable to f/u calls.  Educated on the use of urgent care or physician’s 24 hr access line if assistance is needed after hours.    Care Transition Nurse reviewed discharge instructions, medical action plan, and

## 2025-03-03 ENCOUNTER — CARE COORDINATION (OUTPATIENT)
Dept: CASE MANAGEMENT | Age: 70
End: 2025-03-03

## 2025-03-03 NOTE — CARE COORDINATION
Care Transitions Note    Final Call     Patient Current Location:  Home: [address] [place] [id number]    Care Transition Nurse contacted the patient by telephone. Verified name and  as identifiers.    Patient graduated from the Care Transitions program.  Patient/family verbalizes confidence in the ability to self-manage at this time. has the ability to self manage at this time..      Advance Care Planning:   Does patient have an Advance Directive: reviewed during previous call, see note. .    Handoff:   Patient was not referred to the ACM team due to patient declined services.      Care Summary Note: answered call and verified . Doing \"pretty good\" since last contact. Stated he was seen by PCP and denied any acute needs at present time.  declined any further transition calls needed.  Educated on the use of urgent care or physician’s 24 hr access line if assistance is needed after hours.       Assessments:  Care Transitions Subsequent and Final Call    Subsequent and Final Calls  Do you have any ongoing symptoms?: No  Have your medications changed?: No  Do you have any questions related to your medications?: No  Do you currently have any active services?: No  Do you have any needs or concerns that I can assist you with?: No  Identified Barriers: None  Care Transitions Interventions  Other Interventions:              Upcoming Appointments:    Future Appointments         Provider Specialty Dept Phone    3/10/2025 1:10 PM Minnie Coelho MD Internal Medicine [phone]    2025 10:10 AM Minnie Coelho MD Internal Medicine [phone]            Patient has agreed to contact primary care provider and/or specialist for any further questions, concerns, or needs.    Bertha Sandoval RN

## 2025-03-10 ENCOUNTER — OFFICE VISIT (OUTPATIENT)
Dept: INTERNAL MEDICINE CLINIC | Age: 70
End: 2025-03-10
Payer: MEDICARE

## 2025-03-10 VITALS
SYSTOLIC BLOOD PRESSURE: 130 MMHG | DIASTOLIC BLOOD PRESSURE: 72 MMHG | BODY MASS INDEX: 27.63 KG/M2 | WEIGHT: 193 LBS | HEIGHT: 70 IN | OXYGEN SATURATION: 95 % | HEART RATE: 64 BPM

## 2025-03-10 DIAGNOSIS — N18.30 STAGE 3 CHRONIC KIDNEY DISEASE, UNSPECIFIED WHETHER STAGE 3A OR 3B CKD (HCC): ICD-10-CM

## 2025-03-10 DIAGNOSIS — E11.43 TYPE 2 DIABETES MELLITUS WITH DIABETIC AUTONOMIC NEUROPATHY, WITHOUT LONG-TERM CURRENT USE OF INSULIN (HCC): Primary | ICD-10-CM

## 2025-03-10 PROCEDURE — 3078F DIAST BP <80 MM HG: CPT | Performed by: INTERNAL MEDICINE

## 2025-03-10 PROCEDURE — 2022F DILAT RTA XM EVC RTNOPTHY: CPT | Performed by: INTERNAL MEDICINE

## 2025-03-10 PROCEDURE — G8427 DOCREV CUR MEDS BY ELIG CLIN: HCPCS | Performed by: INTERNAL MEDICINE

## 2025-03-10 PROCEDURE — 3075F SYST BP GE 130 - 139MM HG: CPT | Performed by: INTERNAL MEDICINE

## 2025-03-10 PROCEDURE — 1111F DSCHRG MED/CURRENT MED MERGE: CPT | Performed by: INTERNAL MEDICINE

## 2025-03-10 PROCEDURE — 3017F COLORECTAL CA SCREEN DOC REV: CPT | Performed by: INTERNAL MEDICINE

## 2025-03-10 PROCEDURE — G8417 CALC BMI ABV UP PARAM F/U: HCPCS | Performed by: INTERNAL MEDICINE

## 2025-03-10 PROCEDURE — 99213 OFFICE O/P EST LOW 20 MIN: CPT | Performed by: INTERNAL MEDICINE

## 2025-03-10 PROCEDURE — 3044F HG A1C LEVEL LT 7.0%: CPT | Performed by: INTERNAL MEDICINE

## 2025-03-10 PROCEDURE — 1160F RVW MEDS BY RX/DR IN RCRD: CPT | Performed by: INTERNAL MEDICINE

## 2025-03-10 PROCEDURE — 36415 COLL VENOUS BLD VENIPUNCTURE: CPT | Performed by: INTERNAL MEDICINE

## 2025-03-10 PROCEDURE — 1036F TOBACCO NON-USER: CPT | Performed by: INTERNAL MEDICINE

## 2025-03-10 PROCEDURE — 1159F MED LIST DOCD IN RCRD: CPT | Performed by: INTERNAL MEDICINE

## 2025-03-10 PROCEDURE — 1124F ACP DISCUSS-NO DSCNMKR DOCD: CPT | Performed by: INTERNAL MEDICINE

## 2025-03-10 NOTE — PROGRESS NOTES
Ant Carrasquillo  YOB: 1955    Date of Service:  3/10/2025    Chief Complaint:      Chief Complaint   Patient presents with    Diabetes       Assessment/Plan:  Ant was seen today for diabetes.    Diagnoses and all orders for this visit:    Type 2 diabetes mellitus with diabetic autonomic neuropathy, without long-term current use of insulin ([facility])    Stage 3 chronic kidney disease, unspecified whether stage 3a or 3b CKD ([facility])  -     Renal Function Panel    Stable and continue on current medications.    Return Fasting AWV and f/u 5/5.      HPI:  Ant Carrasquillo is a 69 y.o.    Treatment Adherence:   Medication compliance:  compliant most of the time  Diet compliance:  compliant most of the time  Weight trend: stable  Current exercise: walks 5 time(s) per week  Barriers: none  Diabetes Mellitus Type 2: Current symptoms/problems include Neuropathy on Neurontin 800 mg qhs  Home blood sugar records: fasting range: 100-139 AM and 167-168 and uses Glimepiride 2 mg 1/2 2-3 times a week when he eats carbs for breakfast.  Jardiance was too expensive.  Any episodes of hypoglycemia? yes   Eye exam current (within one year): no  Tobacco history: He  reports that he has never smoked. He has never used smokeless tobacco.   Daily Aspirin? Yes  Known diabetic complications: autonomic neuropathy  Hypertension: resolve. Home blood pressure monitoring: Yes - 130/70-80 on Toprol XL 25 mg daily and off Vasotec 10 mg bid.  He is adherent to a low sodium diet. Patient denies chest pain, shortness of breath, headache, lightheadedness, blurred vision, peripheral edema, palpitations, dry cough, and fatigue.  Antihypertensive medication side effects: no medication side effects noted.  Use of agents associated with hypertension: none.   Hyperlipidemia:  No new myalgias or GI upset on rosuvastatin (Crestor) 5 mg qd.     RA:  stable on Plaquenil and Voltaren 75 mg twice a day, Folic acid per rheumatologist.  H/O nonpainful

## 2025-03-11 ENCOUNTER — RESULTS FOLLOW-UP (OUTPATIENT)
Dept: INTERNAL MEDICINE CLINIC | Age: 70
End: 2025-03-11

## 2025-03-11 LAB
ALBUMIN SERPL-MCNC: 4.3 G/DL (ref 3.4–5)
ANION GAP SERPL CALCULATED.3IONS-SCNC: 13 MMOL/L (ref 3–16)
BUN SERPL-MCNC: 41 MG/DL (ref 7–20)
CALCIUM SERPL-MCNC: 9.4 MG/DL (ref 8.3–10.6)
CHLORIDE SERPL-SCNC: 104 MMOL/L (ref 99–110)
CO2 SERPL-SCNC: 22 MMOL/L (ref 21–32)
CREAT SERPL-MCNC: 2.9 MG/DL (ref 0.8–1.3)
GFR SERPLBLD CREATININE-BSD FMLA CKD-EPI: 23 ML/MIN/{1.73_M2}
GLUCOSE SERPL-MCNC: 133 MG/DL (ref 70–99)
PHOSPHATE SERPL-MCNC: 4.9 MG/DL (ref 2.5–4.9)
POTASSIUM SERPL-SCNC: 5.4 MMOL/L (ref 3.5–5.1)
SODIUM SERPL-SCNC: 139 MMOL/L (ref 136–145)

## 2025-03-19 DIAGNOSIS — E11.43 TYPE 2 DIABETES MELLITUS WITH DIABETIC AUTONOMIC NEUROPATHY, WITHOUT LONG-TERM CURRENT USE OF INSULIN (HCC): ICD-10-CM

## 2025-03-19 RX ORDER — GLIMEPIRIDE 2 MG/1
TABLET ORAL
Qty: 60 TABLET | Refills: 0 | OUTPATIENT
Start: 2025-03-19

## 2025-04-08 ENCOUNTER — APPOINTMENT (OUTPATIENT)
Dept: CT IMAGING | Age: 70
End: 2025-04-08
Payer: MEDICARE

## 2025-04-08 ENCOUNTER — HOSPITAL ENCOUNTER (INPATIENT)
Age: 70
LOS: 3 days | Discharge: HOME | End: 2025-04-11
Attending: EMERGENCY MEDICINE | Admitting: INTERNAL MEDICINE
Payer: MEDICARE

## 2025-04-08 ENCOUNTER — APPOINTMENT (OUTPATIENT)
Dept: GENERAL RADIOLOGY | Age: 70
End: 2025-04-08
Payer: MEDICARE

## 2025-04-08 DIAGNOSIS — N18.9 CHRONIC KIDNEY DISEASE, UNSPECIFIED CKD STAGE: ICD-10-CM

## 2025-04-08 DIAGNOSIS — A41.9 SEPSIS, DUE TO UNSPECIFIED ORGANISM, UNSPECIFIED WHETHER ACUTE ORGAN DYSFUNCTION PRESENT (HCC): ICD-10-CM

## 2025-04-08 DIAGNOSIS — N39.0 URINARY TRACT INFECTION WITHOUT HEMATURIA, SITE UNSPECIFIED: Primary | ICD-10-CM

## 2025-04-08 DIAGNOSIS — R65.10 SIRS (SYSTEMIC INFLAMMATORY RESPONSE SYNDROME) (HCC): ICD-10-CM

## 2025-04-08 DIAGNOSIS — N30.01 ACUTE CYSTITIS WITH HEMATURIA: ICD-10-CM

## 2025-04-08 LAB
ALBUMIN SERPL-MCNC: 3.6 G/DL (ref 3.4–5)
ALBUMIN/GLOB SERPL: 1.1 {RATIO} (ref 1.1–2.2)
ALP SERPL-CCNC: 96 U/L (ref 40–129)
ALT SERPL-CCNC: 17 U/L (ref 10–40)
ANION GAP SERPL CALCULATED.3IONS-SCNC: 16 MMOL/L (ref 3–16)
AST SERPL-CCNC: 22 U/L (ref 15–37)
BACTERIA URNS QL MICRO: ABNORMAL /HPF
BASE EXCESS BLDV CALC-SCNC: -4.7 MMOL/L (ref -3–3)
BASOPHILS # BLD: 0 K/UL (ref 0–0.2)
BASOPHILS NFR BLD: 0.1 %
BILIRUB SERPL-MCNC: 0.5 MG/DL (ref 0–1)
BILIRUB UR QL STRIP.AUTO: NEGATIVE
BUN SERPL-MCNC: 42 MG/DL (ref 7–20)
CALCIUM SERPL-MCNC: 9 MG/DL (ref 8.3–10.6)
CHLORIDE SERPL-SCNC: 103 MMOL/L (ref 99–110)
CLARITY UR: ABNORMAL
CO2 BLDV-SCNC: 19 MMOL/L
CO2 SERPL-SCNC: 19 MMOL/L (ref 21–32)
COHGB MFR BLDV: 6.9 % (ref 0–1.5)
COLOR UR: YELLOW
CREAT SERPL-MCNC: 3.1 MG/DL (ref 0.8–1.3)
DEPRECATED RDW RBC AUTO: 14 % (ref 12.4–15.4)
EOSINOPHIL # BLD: 0 K/UL (ref 0–0.6)
EOSINOPHIL NFR BLD: 0 %
EPI CELLS #/AREA URNS HPF: ABNORMAL /HPF (ref 0–5)
FLUAV RNA RESP QL NAA+PROBE: NOT DETECTED
FLUBV RNA RESP QL NAA+PROBE: NOT DETECTED
GFR SERPLBLD CREATININE-BSD FMLA CKD-EPI: 21 ML/MIN/{1.73_M2}
GLUCOSE BLD-MCNC: 192 MG/DL (ref 70–99)
GLUCOSE SERPL-MCNC: 189 MG/DL (ref 70–99)
GLUCOSE UR STRIP.AUTO-MCNC: NEGATIVE MG/DL
HCO3 BLDV-SCNC: 17.7 MMOL/L (ref 23–29)
HCT VFR BLD AUTO: 30.5 % (ref 40.5–52.5)
HGB BLD-MCNC: 9.9 G/DL (ref 13.5–17.5)
HGB UR QL STRIP.AUTO: ABNORMAL
KETONES UR STRIP.AUTO-MCNC: NEGATIVE MG/DL
LACTATE BLDV-SCNC: 1.2 MMOL/L (ref 0.4–2)
LEUKOCYTE ESTERASE UR QL STRIP.AUTO: ABNORMAL
LIPASE SERPL-CCNC: 27 U/L (ref 13–60)
LYMPHOCYTES # BLD: 0.5 K/UL (ref 1–5.1)
LYMPHOCYTES NFR BLD: 2.9 %
MCH RBC QN AUTO: 31.3 PG (ref 26–34)
MCHC RBC AUTO-ENTMCNC: 32.5 G/DL (ref 31–36)
MCV RBC AUTO: 96.1 FL (ref 80–100)
METHGB MFR BLDV: 0.3 %
MONOCYTES # BLD: 1.1 K/UL (ref 0–1.3)
MONOCYTES NFR BLD: 6.5 %
NEUTROPHILS # BLD: 15.2 K/UL (ref 1.7–7.7)
NEUTROPHILS NFR BLD: 90.5 %
NITRITE UR QL STRIP.AUTO: NEGATIVE
NT-PROBNP SERPL-MCNC: ABNORMAL PG/ML (ref 0–124)
O2 THERAPY: ABNORMAL
PCO2 BLDV: 24.2 MMHG (ref 40–50)
PERFORMED ON: ABNORMAL
PH BLDV: 7.48 [PH] (ref 7.35–7.45)
PH UR STRIP.AUTO: 5.5 [PH] (ref 5–8)
PLATELET # BLD AUTO: 252 K/UL (ref 135–450)
PMV BLD AUTO: 8.8 FL (ref 5–10.5)
PO2 BLDV: 61 MMHG (ref 25–40)
POTASSIUM SERPL-SCNC: 4.9 MMOL/L (ref 3.5–5.1)
PROT SERPL-MCNC: 6.9 G/DL (ref 6.4–8.2)
PROT UR STRIP.AUTO-MCNC: >=300 MG/DL
RBC # BLD AUTO: 3.18 M/UL (ref 4.2–5.9)
RBC #/AREA URNS HPF: ABNORMAL /HPF (ref 0–4)
SAO2 % BLDV: 93 %
SARS-COV-2 RNA RESP QL NAA+PROBE: NOT DETECTED
SODIUM SERPL-SCNC: 138 MMOL/L (ref 136–145)
SP GR UR STRIP.AUTO: 1.02 (ref 1–1.03)
TROPONIN, HIGH SENSITIVITY: 59 NG/L (ref 0–22)
UA COMPLETE W REFLEX CULTURE PNL UR: YES
UA DIPSTICK W REFLEX MICRO PNL UR: YES
URN SPEC COLLECT METH UR: ABNORMAL
UROBILINOGEN UR STRIP-ACNC: 0.2 E.U./DL
WBC # BLD AUTO: 16.8 K/UL (ref 4–11)
WBC #/AREA URNS HPF: >100 /HPF (ref 0–5)

## 2025-04-08 PROCEDURE — 87077 CULTURE AEROBIC IDENTIFY: CPT

## 2025-04-08 PROCEDURE — 1200000000 HC SEMI PRIVATE

## 2025-04-08 PROCEDURE — 96361 HYDRATE IV INFUSION ADD-ON: CPT

## 2025-04-08 PROCEDURE — 84484 ASSAY OF TROPONIN QUANT: CPT

## 2025-04-08 PROCEDURE — 93005 ELECTROCARDIOGRAM TRACING: CPT | Performed by: EMERGENCY MEDICINE

## 2025-04-08 PROCEDURE — 2580000003 HC RX 258: Performed by: INTERNAL MEDICINE

## 2025-04-08 PROCEDURE — 85025 COMPLETE CBC W/AUTO DIFF WBC: CPT

## 2025-04-08 PROCEDURE — 99285 EMERGENCY DEPT VISIT HI MDM: CPT

## 2025-04-08 PROCEDURE — 6360000002 HC RX W HCPCS: Performed by: EMERGENCY MEDICINE

## 2025-04-08 PROCEDURE — 74176 CT ABD & PELVIS W/O CONTRAST: CPT

## 2025-04-08 PROCEDURE — 2580000003 HC RX 258: Performed by: PHYSICIAN ASSISTANT

## 2025-04-08 PROCEDURE — 36415 COLL VENOUS BLD VENIPUNCTURE: CPT

## 2025-04-08 PROCEDURE — 87086 URINE CULTURE/COLONY COUNT: CPT

## 2025-04-08 PROCEDURE — 96374 THER/PROPH/DIAG INJ IV PUSH: CPT

## 2025-04-08 PROCEDURE — 81001 URINALYSIS AUTO W/SCOPE: CPT

## 2025-04-08 PROCEDURE — 80053 COMPREHEN METABOLIC PANEL: CPT

## 2025-04-08 PROCEDURE — 87636 SARSCOV2 & INF A&B AMP PRB: CPT

## 2025-04-08 PROCEDURE — 6360000002 HC RX W HCPCS: Performed by: PHYSICIAN ASSISTANT

## 2025-04-08 PROCEDURE — 83605 ASSAY OF LACTIC ACID: CPT

## 2025-04-08 PROCEDURE — 6370000000 HC RX 637 (ALT 250 FOR IP): Performed by: NURSE PRACTITIONER

## 2025-04-08 PROCEDURE — 6360000002 HC RX W HCPCS: Performed by: INTERNAL MEDICINE

## 2025-04-08 PROCEDURE — 83880 ASSAY OF NATRIURETIC PEPTIDE: CPT

## 2025-04-08 PROCEDURE — 2500000003 HC RX 250 WO HCPCS: Performed by: INTERNAL MEDICINE

## 2025-04-08 PROCEDURE — 87186 SC STD MICRODIL/AGAR DIL: CPT

## 2025-04-08 PROCEDURE — 71046 X-RAY EXAM CHEST 2 VIEWS: CPT

## 2025-04-08 PROCEDURE — 6370000000 HC RX 637 (ALT 250 FOR IP): Performed by: INTERNAL MEDICINE

## 2025-04-08 PROCEDURE — 6360000002 HC RX W HCPCS: Performed by: NURSE PRACTITIONER

## 2025-04-08 PROCEDURE — 2500000003 HC RX 250 WO HCPCS: Performed by: EMERGENCY MEDICINE

## 2025-04-08 PROCEDURE — 87040 BLOOD CULTURE FOR BACTERIA: CPT

## 2025-04-08 PROCEDURE — 6370000000 HC RX 637 (ALT 250 FOR IP): Performed by: PHYSICIAN ASSISTANT

## 2025-04-08 PROCEDURE — 83690 ASSAY OF LIPASE: CPT

## 2025-04-08 PROCEDURE — 82803 BLOOD GASES ANY COMBINATION: CPT

## 2025-04-08 RX ORDER — SODIUM CHLORIDE 9 MG/ML
INJECTION, SOLUTION INTRAVENOUS CONTINUOUS
Status: ACTIVE | OUTPATIENT
Start: 2025-04-08 | End: 2025-04-09

## 2025-04-08 RX ORDER — INSULIN LISPRO 100 [IU]/ML
0-4 INJECTION, SOLUTION INTRAVENOUS; SUBCUTANEOUS
Status: DISCONTINUED | OUTPATIENT
Start: 2025-04-08 | End: 2025-04-11 | Stop reason: HOSPADM

## 2025-04-08 RX ORDER — SODIUM CHLORIDE 0.9 % (FLUSH) 0.9 %
5-40 SYRINGE (ML) INJECTION EVERY 12 HOURS SCHEDULED
Status: DISCONTINUED | OUTPATIENT
Start: 2025-04-08 | End: 2025-04-11 | Stop reason: HOSPADM

## 2025-04-08 RX ORDER — ONDANSETRON 2 MG/ML
4 INJECTION INTRAMUSCULAR; INTRAVENOUS EVERY 6 HOURS PRN
Status: DISCONTINUED | OUTPATIENT
Start: 2025-04-08 | End: 2025-04-11 | Stop reason: HOSPADM

## 2025-04-08 RX ORDER — DEXTROSE MONOHYDRATE 100 MG/ML
INJECTION, SOLUTION INTRAVENOUS CONTINUOUS PRN
Status: DISCONTINUED | OUTPATIENT
Start: 2025-04-08 | End: 2025-04-11 | Stop reason: HOSPADM

## 2025-04-08 RX ORDER — FOLIC ACID 1 MG/1
1 TABLET ORAL DAILY
Status: DISCONTINUED | OUTPATIENT
Start: 2025-04-09 | End: 2025-04-08

## 2025-04-08 RX ORDER — POLYETHYLENE GLYCOL 3350 17 G/17G
17 POWDER, FOR SOLUTION ORAL DAILY PRN
Status: DISCONTINUED | OUTPATIENT
Start: 2025-04-08 | End: 2025-04-11 | Stop reason: HOSPADM

## 2025-04-08 RX ORDER — ONDANSETRON 4 MG/1
4 TABLET, ORALLY DISINTEGRATING ORAL EVERY 8 HOURS PRN
Status: DISCONTINUED | OUTPATIENT
Start: 2025-04-08 | End: 2025-04-11 | Stop reason: HOSPADM

## 2025-04-08 RX ORDER — SODIUM CHLORIDE 9 MG/ML
INJECTION, SOLUTION INTRAVENOUS PRN
Status: DISCONTINUED | OUTPATIENT
Start: 2025-04-08 | End: 2025-04-11 | Stop reason: HOSPADM

## 2025-04-08 RX ORDER — ENOXAPARIN SODIUM 100 MG/ML
30 INJECTION SUBCUTANEOUS EVERY EVENING
Status: DISCONTINUED | OUTPATIENT
Start: 2025-04-08 | End: 2025-04-11 | Stop reason: HOSPADM

## 2025-04-08 RX ORDER — GABAPENTIN 400 MG/1
800 CAPSULE ORAL NIGHTLY
Status: DISCONTINUED | OUTPATIENT
Start: 2025-04-08 | End: 2025-04-11 | Stop reason: HOSPADM

## 2025-04-08 RX ORDER — ACETAMINOPHEN 650 MG/1
650 SUPPOSITORY RECTAL EVERY 6 HOURS PRN
Status: DISCONTINUED | OUTPATIENT
Start: 2025-04-08 | End: 2025-04-11 | Stop reason: HOSPADM

## 2025-04-08 RX ORDER — 0.9 % SODIUM CHLORIDE 0.9 %
1000 INTRAVENOUS SOLUTION INTRAVENOUS ONCE
Status: COMPLETED | OUTPATIENT
Start: 2025-04-08 | End: 2025-04-08

## 2025-04-08 RX ORDER — ONDANSETRON 2 MG/ML
4 INJECTION INTRAMUSCULAR; INTRAVENOUS ONCE
Status: COMPLETED | OUTPATIENT
Start: 2025-04-08 | End: 2025-04-08

## 2025-04-08 RX ORDER — SODIUM CHLORIDE 0.9 % (FLUSH) 0.9 %
5-40 SYRINGE (ML) INJECTION PRN
Status: DISCONTINUED | OUTPATIENT
Start: 2025-04-08 | End: 2025-04-11 | Stop reason: HOSPADM

## 2025-04-08 RX ORDER — GLUCAGON 1 MG/ML
1 KIT INJECTION PRN
Status: DISCONTINUED | OUTPATIENT
Start: 2025-04-08 | End: 2025-04-11 | Stop reason: HOSPADM

## 2025-04-08 RX ORDER — METOPROLOL SUCCINATE 25 MG/1
25 TABLET, EXTENDED RELEASE ORAL DAILY
Status: DISCONTINUED | OUTPATIENT
Start: 2025-04-09 | End: 2025-04-11 | Stop reason: HOSPADM

## 2025-04-08 RX ORDER — ACETAMINOPHEN 325 MG/1
650 TABLET ORAL EVERY 6 HOURS PRN
Status: DISCONTINUED | OUTPATIENT
Start: 2025-04-08 | End: 2025-04-11 | Stop reason: HOSPADM

## 2025-04-08 RX ORDER — PROCHLORPERAZINE EDISYLATE 5 MG/ML
10 INJECTION INTRAMUSCULAR; INTRAVENOUS EVERY 6 HOURS PRN
Status: DISCONTINUED | OUTPATIENT
Start: 2025-04-08 | End: 2025-04-11 | Stop reason: HOSPADM

## 2025-04-08 RX ORDER — ASPIRIN 81 MG/1
81 TABLET ORAL DAILY
Status: DISCONTINUED | OUTPATIENT
Start: 2025-04-09 | End: 2025-04-11 | Stop reason: HOSPADM

## 2025-04-08 RX ORDER — MECOBALAMIN 5000 MCG
10 TABLET,DISINTEGRATING ORAL NIGHTLY
Status: DISCONTINUED | OUTPATIENT
Start: 2025-04-08 | End: 2025-04-11 | Stop reason: HOSPADM

## 2025-04-08 RX ORDER — HYDROXYCHLOROQUINE SULFATE 200 MG/1
200 TABLET, FILM COATED ORAL 2 TIMES DAILY
Status: DISCONTINUED | OUTPATIENT
Start: 2025-04-09 | End: 2025-04-11 | Stop reason: HOSPADM

## 2025-04-08 RX ORDER — ROSUVASTATIN CALCIUM 10 MG/1
5 TABLET, COATED ORAL NIGHTLY
Status: DISCONTINUED | OUTPATIENT
Start: 2025-04-08 | End: 2025-04-08

## 2025-04-08 RX ORDER — ACETAMINOPHEN 325 MG/1
650 TABLET ORAL ONCE
Status: COMPLETED | OUTPATIENT
Start: 2025-04-08 | End: 2025-04-08

## 2025-04-08 RX ORDER — CHOLECALCIFEROL (VITAMIN D3) 1250 MCG
CAPSULE ORAL
COMMUNITY

## 2025-04-08 RX ADMIN — SODIUM CHLORIDE, PRESERVATIVE FREE 10 ML: 5 INJECTION INTRAVENOUS at 20:52

## 2025-04-08 RX ADMIN — ONDANSETRON 4 MG: 2 INJECTION, SOLUTION INTRAMUSCULAR; INTRAVENOUS at 19:13

## 2025-04-08 RX ADMIN — ONDANSETRON 4 MG: 2 INJECTION, SOLUTION INTRAMUSCULAR; INTRAVENOUS at 10:24

## 2025-04-08 RX ADMIN — INSULIN LISPRO 1 UNITS: 100 INJECTION, SOLUTION INTRAVENOUS; SUBCUTANEOUS at 20:54

## 2025-04-08 RX ADMIN — ENOXAPARIN SODIUM 30 MG: 100 INJECTION SUBCUTANEOUS at 20:53

## 2025-04-08 RX ADMIN — PROCHLORPERAZINE EDISYLATE 10 MG: 5 INJECTION INTRAMUSCULAR; INTRAVENOUS at 21:19

## 2025-04-08 RX ADMIN — SODIUM CHLORIDE: 0.9 INJECTION, SOLUTION INTRAVENOUS at 17:49

## 2025-04-08 RX ADMIN — SODIUM CHLORIDE 1000 ML: 0.9 INJECTION, SOLUTION INTRAVENOUS at 10:24

## 2025-04-08 RX ADMIN — WATER 1000 MG: 1 INJECTION INTRAMUSCULAR; INTRAVENOUS; SUBCUTANEOUS at 15:28

## 2025-04-08 RX ADMIN — ACETAMINOPHEN 650 MG: 325 TABLET ORAL at 10:24

## 2025-04-08 ASSESSMENT — PAIN SCALES - GENERAL: PAINLEVEL_OUTOF10: 0

## 2025-04-08 ASSESSMENT — PAIN - FUNCTIONAL ASSESSMENT: PAIN_FUNCTIONAL_ASSESSMENT: 0-10

## 2025-04-08 NOTE — ED NOTES
Ant Carrasquillo is a 69 y.o. male admitted for  Principal Problem:    UTI (urinary tract infection)  Resolved Problems:    * No resolved hospital problems. *  .   Patient Home via family with   Chief Complaint   Patient presents with    Cold Symptoms     Chills and vomiting since yesterday.    .  Patient is alert and Person, Place, Time, and Situation  Patient's baseline mobility: Baseline Mobility: Independent   Code Status: Prior   Cardiac Rhythm:       Is patient on baseline Oxygen: no how many Liters:   Abnormal Assessment Findings:     Isolation: None      NIH Score:    C-SSRS: Risk of Suicide: No Risk  Bedside swallow:        Active LDA's:   Peripheral IV 04/08/25 Left Antecubital (Active)   Site Assessment Clean, dry & intact 04/08/25 0941   Line Status Normal saline locked;Specimen collected 04/08/25 0941   Phlebitis Assessment No symptoms 04/08/25 0941   Infiltration Assessment 0 04/08/25 0941   Alcohol Cap Used Yes 04/08/25 0941   Dressing Status Clean, dry & intact 04/08/25 0941   Dressing Type Transparent 04/08/25 0941       Peripheral IV Left Forearm (Active)     Patient admitted with a krishna: no   Family/Caregiver Present no Any Concerns: no   Restraints no  Sitter no         Vitals: MEWS Score: 3    Vitals:    04/08/25 1400 04/08/25 1431 04/08/25 1446 04/08/25 1501   BP: 98/64 93/69 103/75 102/70   Pulse: 76 78 78 76   Resp: 17 20 13 14   Temp:       SpO2: 96% 96% 98% 96%   Weight:       Height:           Last documented pain score (0-10 scale) Pain Level: 0  Pain medication administered No.    Pertinent or High Risk Medications/Drips: No.    Pending Blood Product Administration: no    Abnormal labs:   Abnormal Labs Reviewed   CBC WITH AUTO DIFFERENTIAL - Abnormal; Notable for the following components:       Result Value    WBC 16.8 (*)     RBC 3.18 (*)     Hemoglobin 9.9 (*)     Hematocrit 30.5 (*)     Neutrophils Absolute 15.2 (*)     Lymphocytes Absolute 0.5 (*)     All other components within  normal limits   COMPREHENSIVE METABOLIC PANEL W/ REFLEX TO MG FOR LOW K - Abnormal; Notable for the following components:    CO2 19 (*)     Glucose 189 (*)     BUN 42 (*)     Creatinine 3.1 (*)     Est, Glom Filt Rate 21 (*)     All other components within normal limits   URINALYSIS WITH REFLEX TO CULTURE - Abnormal; Notable for the following components:    Clarity, UA SL CLOUDY (*)     Blood, Urine MODERATE (*)     Protein, UA >=300 (*)     Leukocyte Esterase, Urine MODERATE (*)     All other components within normal limits   BLOOD GAS, VENOUS - Abnormal; Notable for the following components:    pH, Evangelist 7.483 (*)     pCO2, Evangelist 24.2 (*)     PO2, Evangelist 61.0 (*)     HCO3, Venous 17.7 (*)     Base Excess, Evangelist -4.7 (*)     Carboxyhemoglobin 6.9 (*)     All other components within normal limits   BRAIN NATRIURETIC PEPTIDE - Abnormal; Notable for the following components:    NT Pro-BNP 23,104 (*)     All other components within normal limits   TROPONIN - Abnormal; Notable for the following components:    Troponin, High Sensitivity 59 (*)     All other components within normal limits   MICROSCOPIC URINALYSIS - Abnormal; Notable for the following components:    WBC, UA >100 (*)     RBC, UA 5-10 (*)     Bacteria, UA 4+ (*)     All other components within normal limits     Critical values: no  Intervention for critical value(s):     Abnormal Imaging: no             You may also review the ED PT Care Timeline found under the Summary Tab, ED Encounter Summary, Timeline Reports, ED Patient Care Timeline.     Recommendation    Pending orders/Uncompleted orders to hand off:      Additional Comments:   If any further questions, please call Sending RN at [phone]

## 2025-04-08 NOTE — PROGRESS NOTES
4 Eyes Skin Assessment     NAME:  Ant Carrasquillo  YOB: 1955  MEDICAL RECORD NUMBER:  [id number]    The patient is being assessed for  Admission    I agree that at least one RN has performed a thorough Head to Toe Skin Assessment on the patient. ALL assessment sites listed below have been assessed.      Areas assessed by both nurses:    Head, Face, Ears, Shoulders, Back, Chest, Arms, Elbows, Hands, Sacrum. Buttock, Coccyx, Ischium, Legs. Feet and Heels, and Under Medical Devices         Does the Patient have a Wound? No noted wound(s)       Sai Prevention initiated by RN: No  Wound Care Orders initiated by RN: No    Pressure Injury (Stage 3,4, Unstageable, DTI, NWPT, and Complex wounds) if present, place Wound referral order by RN under : No    New Ostomies, if present place, Ostomy referral order under : No     Nurse 1 eSignature: Electronically signed by Carmelita Cesar RN on 4/8/25 at 6:32 PM EDT    **SHARE this note so that the co-signing nurse can place an eSignature**    Nurse 2 eSignature: Electronically signed by Rosanne Christensen RN on 4/8/25 at 6:33 PM EDT

## 2025-04-08 NOTE — ED PROVIDER NOTES
[facility] Emergency Department    CHIEF COMPLAINT  Cold Symptoms (Chills and vomiting since yesterday. )      SHARED SERVICE VISIT  I have seen and evaluated this patient with my supervising physician, Dr. Indu Alonso.    HISTORY OF PRESENT ILLNESS  Ant Carrasquillo is a 69 y.o. male who presents to the ED complaining of nausea, vomiting, generalized fatigue, weakness, body aches and chills.  Symptoms have been ongoing for the past 24 hours.  Wife at bedside and says that he has been laying around for the past 24 hours and not eating.  She does not think she can take care of him at this point.  He also admits to mild cough.  He is still able to tolerate p.o. solids and liquids though.  Denies any headache, body ache, fevers or chills.  Denies any sneezing.  Denies any sore throat or congestion.  Denies any vision changes or dizziness.  Denies any chest pain, shortness of breath, or dyspnea on exertion. Denies any urinary symptoms.  Denies any diarrhea or bloody stools.  Denies any new onset back pain.  Denies any recent travel or sick contacts.    No other complaints, modifying factors or associated symptoms.     Nursing notes reviewed.   Past Medical History:   Diagnosis Date    Diabetes mellitus (HCC)     Hyperlipidemia     Hypertension      Past Surgical History:   Procedure Laterality Date    APPENDECTOMY      COLONOSCOPY      EYE SURGERY Left     lasik    FOOT DEBRIDEMENT Right 12/29/2023    FOOT DEBRIDEMENT TO BONE WITH BONE BIOPSY  INCISION AND DRAINAGE RIGHT FOOT performed by Scout Camp DPM at [facility] OR    FOOT SURGERY Right 2017    big toe    TOE AMPUTATION Right 3/3/2020    AMPUTATION OF RIGHT GREAT TOE performed by Candi Nixon DPM at [facility] OR    TOE AMPUTATION Right 1/4/2024    RIGHT HALLUX AMPUTATION performed by Scout Camp DPM at [facility] OR     Family History   Problem Relation Age of Onset    Other Mother         complication from hip surgery     Stability: Low Risk  (2/15/2025)    Housing Stability Vital Sign     Unable to Pay for Housing in the Last Year: No     Number of Times Moved in the Last Year: 0     Homeless in the Last Year: No     Current Facility-Administered Medications   Medication Dose Route Frequency Provider Last Rate Last Admin    [START ON 4/9/2025] cefTRIAXone (ROCEPHIN) 1,000 mg in sterile water 10 mL IV syringe  1,000 mg IntraVENous Q24H Irene John MD         Current Outpatient Medications   Medication Sig Dispense Refill    aspirin 81 MG EC tablet Take 1 tablet by mouth daily 30 tablet 0    metoprolol succinate (TOPROL XL) 25 MG extended release tablet Take 1 tablet by mouth daily 30 tablet 0    glimepiride (AMARYL) 2 MG tablet 1/2 po twice a day and if sugar is higher than 150, increase by 1/2 pill every week up to 1 bid 60 tablet 0    Continuous Glucose Sensor (DEXCOM G7 SENSOR) MISC Replace every 10 days 4 each 0    gabapentin (NEURONTIN) 800 MG tablet Take 1 tablet by mouth at bedtime for 180 days. 90 tablet 1    turmeric 500 MG CAPS Take 1 capsule by mouth daily      Boswellia-Glucosamine-Vit D (OSTEO BI-FLEX ONE PER DAY PO) Take by mouth      rosuvastatin (CRESTOR) 5 MG tablet Take 1 tablet by mouth nightly (Patient not taking: Reported on 3/10/2025) 90 tablet 2    folic acid (FOLVITE) 1 MG tablet Take 1 tablet by mouth daily (Patient not taking: Reported on 3/10/2025) 30 tablet 0    hydroxychloroquine (PLAQUENIL) 200 MG tablet       ferrous sulfate (IRON 325) 325 (65 Fe) MG tablet Take 1 tablet by mouth every 48 hours (Patient not taking: Reported on 3/10/2025) 30 tablet 3     No Known Allergies    REVIEW OF SYSTEMS  10 systems reviewed, pertinent positives per HPI otherwise noted to be negative    PHYSICAL EXAM  /70   Pulse 76   Temp 99.3 °F (37.4 °C)   Resp 14   Ht 1.778 m (5' 10\")   Wt 89.9 kg (198 lb 3.2 oz)   SpO2 96%   BMI 28.44 kg/m²   GENERAL APPEARANCE: Awake and alert. Cooperative.  No acute distress.

## 2025-04-08 NOTE — ED PROVIDER NOTES
Emergency Department Attending SUPERVISORY Provider Note  Location: [facility] EMERGENCY DEPARTMENT  4/8/2025     Chief Complaint   Patient presents with    Cold Symptoms     Chills and vomiting since yesterday.         PATIENT ID:  Ant Carrasquillo is a 69 y.o. male    Past Medical History:   Diagnosis Date    Diabetes mellitus (HCC)     Hyperlipidemia     Hypertension        Ant Carrasquillo with a past medical history of CKD, diabetes, hypertension, hyperlipidemia, was evaluated in the Emergency Department for concerns of chills and nausea and vomiting.  Denies any real abdominal pain, and denies any frequency and urgency, although nursing has noticed that patient has gone to the bathroom multiple times here in the ER I was asked to evaluate this 1:21 PM EDT after 1 L IV fluids and pressure is still soft.  Patient says his blood pressure is usually on the softer side, \"closer to 112 or so.\"chest x-ray shows no pneumonia, but patient has had cough and congestion over the past couple days.      Last ECHO 2/16/2025:     Image quality is adequate.    Left Ventricle: Severely reduced left ventricular systolic function with a visually estimated EF of 20 - 25%. EF by 2D Simpsons Biplane is 25%. Left ventricle size is normal. Normal wall thickness. Severe global hypokinesis present.    Right Ventricle: Reduced systolic function.    Aortic Valve: Not well visualized. Mild sclerosis of the aortic valve cusps.    Mitral Valve: Mild regurgitation.    Tricuspid Valve: Mild regurgitation.    Aorta: Mildly dilated aortic root. Ao root diameter is 4.2 cm. Dilated sinus of Valsalva. Mildly dilated ascending aorta. Ao ascending diameter is 4.0 cm.    Vitals:    04/08/25 1305   BP: (!) 88/61   Pulse: 76   Resp: 25   Temp:    SpO2: 97%        BASIC EXAM:    Focused exam revealed   General: Alert, no acute distress, patient resting comfortably   Skin: Warm, intact, no pallor noted   Head: Normocephalic  Eye: Normal  decision making. I made and approved the management plan and take responsibility for the patient management.     I, Indu Diaz DO am the primary clinician of record.     I personally saw the patient and I independently provided *** minutes of non-concurrent critical care out of the total shared critical care time provided.    This chart was generated in part by using Dragon Dictation system and may contain errors related to that system including errors in grammar, punctuation, and spelling, as well as words and phrases that may be inappropriate. If there are any questions or concerns please feel free to contact the dictating provider for clarification.     INDU DIAZ DO  [facility]

## 2025-04-08 NOTE — PROGRESS NOTES
Vitals:    04/08/25 1724   BP: 106/63   Pulse: 81   Resp: 16   Temp: 98.1 °F (36.7 °C)   SpO2: 100%     Patient alert and oriented x 4. Denies pain. IVF infusing. Patient up to chair. Okay to ambulate in room. Oriented to room. Will monitor.

## 2025-04-08 NOTE — CONSULTS
Consult Placed   Consult placed via Perfect Serve  Who: Brittany Cevallos MD   Date:4/8/2025  Time:5:26 PM

## 2025-04-09 ENCOUNTER — CARE COORDINATION (OUTPATIENT)
Dept: CASE MANAGEMENT | Age: 70
End: 2025-04-09

## 2025-04-09 LAB
ALBUMIN SERPL-MCNC: 3.6 G/DL (ref 3.4–5)
ALBUMIN/GLOB SERPL: 1.5 {RATIO} (ref 1.1–2.2)
ALP SERPL-CCNC: 84 U/L (ref 40–129)
ALT SERPL-CCNC: 20 U/L (ref 10–40)
ANION GAP SERPL CALCULATED.3IONS-SCNC: 12 MMOL/L (ref 3–16)
AST SERPL-CCNC: 31 U/L (ref 15–37)
BASOPHILS # BLD: 0 K/UL (ref 0–0.2)
BASOPHILS NFR BLD: 0.2 %
BILIRUB SERPL-MCNC: 0.3 MG/DL (ref 0–1)
BUN SERPL-MCNC: 47 MG/DL (ref 7–20)
CALCIUM SERPL-MCNC: 8.8 MG/DL (ref 8.3–10.6)
CHLORIDE SERPL-SCNC: 103 MMOL/L (ref 99–110)
CO2 SERPL-SCNC: 19 MMOL/L (ref 21–32)
CREAT SERPL-MCNC: 3.3 MG/DL (ref 0.8–1.3)
DEPRECATED RDW RBC AUTO: 13.8 % (ref 12.4–15.4)
EKG ATRIAL RATE: 75 BPM
EKG DIAGNOSIS: NORMAL
EKG P AXIS: 1 DEGREES
EKG P-R INTERVAL: 152 MS
EKG Q-T INTERVAL: 404 MS
EKG QRS DURATION: 82 MS
EKG QTC CALCULATION (BAZETT): 451 MS
EKG R AXIS: 4 DEGREES
EKG T AXIS: -47 DEGREES
EKG VENTRICULAR RATE: 75 BPM
EOSINOPHIL # BLD: 0 K/UL (ref 0–0.6)
EOSINOPHIL NFR BLD: 0 %
EST. AVERAGE GLUCOSE BLD GHB EST-MCNC: 128.4 MG/DL
GFR SERPLBLD CREATININE-BSD FMLA CKD-EPI: 19 ML/MIN/{1.73_M2}
GLUCOSE BLD-MCNC: 173 MG/DL (ref 70–99)
GLUCOSE BLD-MCNC: 176 MG/DL (ref 70–99)
GLUCOSE BLD-MCNC: 207 MG/DL (ref 70–99)
GLUCOSE BLD-MCNC: 321 MG/DL (ref 70–99)
GLUCOSE SERPL-MCNC: 200 MG/DL (ref 70–99)
HBA1C MFR BLD: 6.1 %
HCT VFR BLD AUTO: 23.8 % (ref 40.5–52.5)
HGB BLD-MCNC: 7.7 G/DL (ref 13.5–17.5)
LYMPHOCYTES # BLD: 0.6 K/UL (ref 1–5.1)
LYMPHOCYTES NFR BLD: 3.9 %
MCH RBC QN AUTO: 31.2 PG (ref 26–34)
MCHC RBC AUTO-ENTMCNC: 32.5 G/DL (ref 31–36)
MCV RBC AUTO: 96 FL (ref 80–100)
MONOCYTES # BLD: 0.9 K/UL (ref 0–1.3)
MONOCYTES NFR BLD: 5.6 %
NEUTROPHILS # BLD: 14.7 K/UL (ref 1.7–7.7)
NEUTROPHILS NFR BLD: 90.3 %
PERFORMED ON: ABNORMAL
PLATELET # BLD AUTO: 190 K/UL (ref 135–450)
PMV BLD AUTO: 9.2 FL (ref 5–10.5)
POTASSIUM SERPL-SCNC: 5.1 MMOL/L (ref 3.5–5.1)
PROT SERPL-MCNC: 6 G/DL (ref 6.4–8.2)
RBC # BLD AUTO: 2.48 M/UL (ref 4.2–5.9)
SODIUM SERPL-SCNC: 134 MMOL/L (ref 136–145)
WBC # BLD AUTO: 16.3 K/UL (ref 4–11)

## 2025-04-09 PROCEDURE — 97530 THERAPEUTIC ACTIVITIES: CPT

## 2025-04-09 PROCEDURE — 80053 COMPREHEN METABOLIC PANEL: CPT

## 2025-04-09 PROCEDURE — 2580000003 HC RX 258: Performed by: INTERNAL MEDICINE

## 2025-04-09 PROCEDURE — 99222 1ST HOSP IP/OBS MODERATE 55: CPT | Performed by: INTERNAL MEDICINE

## 2025-04-09 PROCEDURE — 1200000000 HC SEMI PRIVATE

## 2025-04-09 PROCEDURE — 6360000002 HC RX W HCPCS: Performed by: NURSE PRACTITIONER

## 2025-04-09 PROCEDURE — 2500000003 HC RX 250 WO HCPCS: Performed by: INTERNAL MEDICINE

## 2025-04-09 PROCEDURE — 6370000000 HC RX 637 (ALT 250 FOR IP): Performed by: INTERNAL MEDICINE

## 2025-04-09 PROCEDURE — 85025 COMPLETE CBC W/AUTO DIFF WBC: CPT

## 2025-04-09 PROCEDURE — 6370000000 HC RX 637 (ALT 250 FOR IP): Performed by: NURSE PRACTITIONER

## 2025-04-09 PROCEDURE — 83036 HEMOGLOBIN GLYCOSYLATED A1C: CPT

## 2025-04-09 PROCEDURE — 6360000002 HC RX W HCPCS: Performed by: INTERNAL MEDICINE

## 2025-04-09 PROCEDURE — 36415 COLL VENOUS BLD VENIPUNCTURE: CPT

## 2025-04-09 PROCEDURE — 97166 OT EVAL MOD COMPLEX 45 MIN: CPT

## 2025-04-09 PROCEDURE — P9045 ALBUMIN (HUMAN), 5%, 250 ML: HCPCS | Performed by: NURSE PRACTITIONER

## 2025-04-09 PROCEDURE — 97162 PT EVAL MOD COMPLEX 30 MIN: CPT

## 2025-04-09 PROCEDURE — 93010 ELECTROCARDIOGRAM REPORT: CPT | Performed by: INTERNAL MEDICINE

## 2025-04-09 PROCEDURE — 97535 SELF CARE MNGMENT TRAINING: CPT

## 2025-04-09 PROCEDURE — 97116 GAIT TRAINING THERAPY: CPT

## 2025-04-09 RX ORDER — ALBUMIN HUMAN 50 G/1000ML
25 SOLUTION INTRAVENOUS ONCE
Status: COMPLETED | OUTPATIENT
Start: 2025-04-09 | End: 2025-04-09

## 2025-04-09 RX ADMIN — SODIUM CHLORIDE: 0.9 INJECTION, SOLUTION INTRAVENOUS at 07:24

## 2025-04-09 RX ADMIN — GABAPENTIN 800 MG: 400 CAPSULE ORAL at 20:47

## 2025-04-09 RX ADMIN — ENOXAPARIN SODIUM 30 MG: 100 INJECTION SUBCUTANEOUS at 17:21

## 2025-04-09 RX ADMIN — INSULIN LISPRO 1 UNITS: 100 INJECTION, SOLUTION INTRAVENOUS; SUBCUTANEOUS at 10:45

## 2025-04-09 RX ADMIN — WATER 1000 MG: 1 INJECTION INTRAMUSCULAR; INTRAVENOUS; SUBCUTANEOUS at 10:46

## 2025-04-09 RX ADMIN — METOPROLOL SUCCINATE 25 MG: 25 TABLET, EXTENDED RELEASE ORAL at 10:42

## 2025-04-09 RX ADMIN — HYDROXYCHLOROQUINE SULFATE 200 MG: 200 TABLET ORAL at 20:44

## 2025-04-09 RX ADMIN — SODIUM CHLORIDE, PRESERVATIVE FREE 10 ML: 5 INJECTION INTRAVENOUS at 20:46

## 2025-04-09 RX ADMIN — INSULIN LISPRO 3 UNITS: 100 INJECTION, SOLUTION INTRAVENOUS; SUBCUTANEOUS at 12:31

## 2025-04-09 RX ADMIN — SODIUM CHLORIDE, PRESERVATIVE FREE 10 ML: 5 INJECTION INTRAVENOUS at 10:46

## 2025-04-09 RX ADMIN — ALBUMIN (HUMAN) 25 G: 12.5 INJECTION, SOLUTION INTRAVENOUS at 00:37

## 2025-04-09 RX ADMIN — Medication 10 MG: at 20:46

## 2025-04-09 RX ADMIN — HYDROXYCHLOROQUINE SULFATE 200 MG: 200 TABLET ORAL at 12:31

## 2025-04-09 RX ADMIN — ASPIRIN 81 MG: 81 TABLET, COATED ORAL at 10:42

## 2025-04-09 NOTE — DISCHARGE INSTR - DIET

## 2025-04-09 NOTE — DISCHARGE INSTRUCTIONS
Heart Failure Resources:  Heart Failure Interactive Workbook:  Go to [email or web address]/publication/?i=260117 for a Free Heart Failure Interactive Workbook provided by The American Heart Association. This interactive workbook will provide information on Healthier Living with Heart Failure. Please copy and paste link into search bar. Use your mouse to scroll through the pages.    HF Newcomb anne:   Heart Failure Free smart phone anne available for iPhone and Android download. Use your phone to track sodium intake, fluid intake, symptoms, and weight.     Low Sodium Diet / Recipes:  Go to www.[employer]YupiCall website for “renal” diet which is Low Sodium! [employer] is a dialysis company, but this website offers free seasonal cookbooks. Each quarter, they will release 25-30 new recipes with a breakdown of calories, sodium, and glucose. You can also go to [email or web address]/recipes website for free recipes.     Home Exercise Program:   Identification of Green/Yellow/Red zones:  You should be able to identify when you feel good (green zone), if you have 1-2 symptoms of HF (yellow zone), or if you are in need of medical attention (red zone).  In your CHF education folder you were provided a “stop light tool” to outline this information.     We want to you to rate your exertion levels:    Our therapy team has discussed means of identification with you such as the \"Carissa scale.\"  The Carissa rating scale ranges from 6 to 20, where 6 means \"no exertion at all\" and 20 means \"maximal exertion.\" The goal is to use this to gauge how much effort it is taking for you to do your normal daily tasks.   You should be able to recognize when too much exertion is being expended.    Elements of Energy Conservation:   Prioritize/Plan: Decide what needs to be done today, and what can wait for a later date, write to do lists, plan ahead to avoid extra trips, and gather supplies and equipment needed before starting an activity.   Position:  Avoid tiring and awkward posture that may impair breathing.  Pace: Slow and steady pace, never rushing!  Pursed lip breathing.  Pursed Lip Breathing: \"Smell the roses, blow out the candles\".

## 2025-04-09 NOTE — PLAN OF CARE
Problem: Chronic Conditions and Co-morbidities  Goal: Patient's chronic conditions and co-morbidity symptoms are monitored and maintained or improved  Outcome: Progressing  Flowsheets (Taken 4/8/2025 1726 by Carmelita Cesar, RN)  Care Plan - Patient's Chronic Conditions and Co-Morbidity Symptoms are Monitored and Maintained or Improved: Monitor and assess patient's chronic conditions and comorbid symptoms for stability, deterioration, or improvement     Problem: Skin/Tissue Integrity  Goal: Skin integrity remains intact  Description: 1.  Monitor for areas of redness and/or skin breakdown  2.  Assess vascular access sites hourly  3.  Every 4-6 hours minimum:  Change oxygen saturation probe site  4.  Every 4-6 hours:  If on nasal continuous positive airway pressure, respiratory therapy assess nares and determine need for appliance change or resting period  Outcome: Progressing  Flowsheets (Taken 4/8/2025 2334)  Skin Integrity Remains Intact:   Every 4-6 hours minimum:  Change oxygen saturation probe site   Assess vascular access sites hourly   Monitor for areas of redness and/or skin breakdown     Problem: Musculoskeletal - Adult  Goal: Maintain proper alignment of affected body part  Outcome: Progressing  Flowsheets (Taken 4/8/2025 2334)  Maintain proper alignment of affected body part: Support and protect limb and body alignment per provider's orders     Problem: Gastrointestinal - Adult  Goal: Minimal or absence of nausea and vomiting  Outcome: Not Progressing  Flowsheets (Taken 4/8/2025 2334)  Minimal or absence of nausea and vomiting: Administer IV fluids as ordered to ensure adequate hydration  Goal: Maintains adequate nutritional intake  Outcome: Not Progressing  Flowsheets (Taken 4/8/2025 2334)  Maintains adequate nutritional intake:   Monitor percentage of each meal consumed   Identify factors contributing to decreased intake, treat as appropriate   Assist with meals as needed

## 2025-04-09 NOTE — CARE COORDINATION
Case Management Assessment  Initial Evaluation    Date/Time of Evaluation: 4/9/2025 3:39 PM  Assessment Completed by: Christine Thompson RN    If patient is discharged prior to next notation, then this note serves as note for discharge by case management.    Patient Name: Ant Carrasquillo                   YOB: 1955  Diagnosis: UTI (urinary tract infection) [N39.0]  SIRS (systemic inflammatory response syndrome) (HCC) [R65.10]  Acute cystitis with hematuria [N30.01]  Urinary tract infection without hematuria, site unspecified [N39.0]  Chronic kidney disease, unspecified CKD stage [N18.9]  Sepsis, due to unspecified organism, unspecified whether acute organ dysfunction present (HCC) [A41.9]                   Date / Time: 4/8/2025  9:31 AM    Patient Admission Status: Inpatient   Readmission Risk (Low < 19, Mod (19-27), High > 27): Readmission Risk Score: 20.9    Current PCP: Minnie Coelho MD  PCP verified by CM? Yes    Chart Reviewed: Yes      History Provided by: Patient  Patient Orientation: Alert and Oriented, Person, Place, Situation, Self    Patient Cognition: Alert    Hospitalization in the last 30 days (Readmission):  No    If yes, Readmission Assessment in CM Navigator will be completed.    Advance Directives:      Code Status: Full Code   Patient's Primary Decision Maker is: Legal Next of Kin    Primary Decision Maker: Janet Carrasquillo - Spouse - [phone]    Discharge Planning:    Patient lives with: Spouse/Significant Other Type of Home: House  Primary Care Giver: Self  Patient Support Systems include: Spouse/Significant Other   Current Financial resources: Medicare  Current community resources: None  Current services prior to admission: None            Current DME:              Type of Home Care services:  None    ADLS  Prior functional level: Independent in ADLs/IADLs  Current functional level: Independent in ADLs/IADLs    PT AM-PAC: 20 /24  OT AM-PAC: 21 /24    Family can provide

## 2025-04-09 NOTE — CONSULTS
CARDIOLOGY CONSULTATION        Patient Name: Ant Carrasquillo  Date of admission: 4/8/2025  9:31 AM  Admission Dx: UTI (urinary tract infection) [N39.0]  SIRS (systemic inflammatory response syndrome) ([facility]) [R65.10]  Acute cystitis with hematuria [N30.01]  Urinary tract infection without hematuria, site unspecified [N39.0]  Chronic kidney disease, unspecified CKD stage [N18.9]  Sepsis, due to unspecified organism, unspecified whether acute organ dysfunction present ([facility]) [A41.9]  Requesting Physician: Irene John MD  Primary Care physician: Minnie Coelho MD    Reason for Consultation/Chief Complaint: Chronic HFrEF, Elevated troponin    History of Present Illness:     Ant Carrasquillo is a 69 y.o. man with chronic HFrEF, HTN, HLP, DM2, kidney stones and chronic anemia admitted for general malaise.  He reports fatigue and chills at home which prompted ED evaluation.  He denies chest pain, palpitations and dizziness.  He also reports he has had several hospital admissions over the last few months which has prevented him from establishing with a cardiologist in the outpatient setting.  Patient is a nonsmoker.  Workup this admission has revealed acute cystitis and multiple bilateral renal calculi.  Labs in ED significant for elevated BNP of 23,104 and mildly elevated troponin with peak of 59 so far, elevated WBC of 16.3, and significant anemia of 7.7.  Echo completed in 02/2025 showed worsening HFrEF with EF 20-25%.  Cardiology consulted to assist with management of CHF.    Past Medical History:   has a past medical history of Diabetes mellitus (HCC), Hyperlipidemia, and Hypertension.    Surgical History:   has a past surgical history that includes Foot surgery (Right, 2017); Colonoscopy; eye surgery (Left); Appendectomy; Toe amputation (Right, 3/3/2020); Foot Debridement (Right, 12/29/2023); and Toe amputation (Right, 1/4/2024).     Social History:   reports that he has never smoked. He has never used  process  --Anemia workup and management per Primary/GI  --Continue supportive care for treatment of kidney stones and WOLF per Urology/Nephrology  --Volume and electrolyte management per Nephrology  --Continue GDMT for CHF as hemodynamics allow, contiue Toprol XL 25mg daily  --Continue ASA 81mg  --Replete electrolytes as needed    No further cardiac intervention indicated at this time.    Cardiology will sign off and recommend clinic FU in 2-4 weeks for outpatient ischemic/coronary evaluation once infectious process has completely resolved and WOLF improved.  Please call back with questions.    I will address the patient's cardiac risk factors and adjusted pharmacologic treatment as needed. In addition, I have reinforced the need for patient directed risk factor modification.  All questions and concerns were addressed to the patient/family. Alternatives to my treatment were discussed.     Thank you for allowing us to participate in the care of Ant Carrasquillo. Please call me with any questions [phone].    Lucio Glover MD [facility]  Cardiovascular Disease  [facility] [place]  [phone] [place] Office  [phone] [place] Office  4/9/2025 12:24 PM

## 2025-04-09 NOTE — PROGRESS NOTES
\"Pt's blood pressure dropped to 79/59mmg. Checked on both arms but it yield the same readings. Pt is asymptomatic as of this writing.\"    Gracie barr sent to NP Liliana Rocha. Albumin IV once dose will be administered.

## 2025-04-09 NOTE — CONSULTS
Ph: [phone], Fax: [phone]           [facility]               Reason for admission:                 UTI/WOLF/nephrolithiasis    Brief Summary :     Ant Carrasquillo is being seen by nephrology for WOLF.  He is known to have recent hospital discharge after treatment for WOLF presented with urinary tract infection lethargy WOLF though his kidney numbers was recovering last hospitalization      Interval History and plan:         Presented with WOLF  BP low  Cr high   Has significant UTI/pyelonephritis  CT scan of the abdomen  Shows 3 mm calculus from lower pole of the right kidney into the region of right pelvis  The calculus has migrated  Does not have hydronephrosis    Plan:  Will consider urology consult or at least usual follow-up closely after discharge                     Assessment :     Acute Kidney Injury on CKD IIIb  Cr 3.3 on consult  Presented with cr of 8.7/ on 2/25 admission, cr was down to 2.9 on last hospital DC  Due to ATN from diarrhea/vomiting  Has history of recurrent WOLF  CKD baseline in the past with creatinine 1.5- 1.9    Pr/cr ratio 92 mg/gm  Has DM   CT abdomen- no kidney  abnormality, but b/l nephrolithiasis  UA 2/25- coarse casts    RF- +ve, C3 CR normal, ANCA-ve, ALEXANDRA-ve, K/L- 0.68, not significant     HFrEF  EF 20-25 %      Hypertension   BP: (78-92)/(55-63)  Pulse:  []   BP goal inpatient 130-140 systolic inpatient  BP low          [facility] would like to thank Mathew Goetz MD   for opportunity to serve this patient      Please call with questions at-   24 Hrs Answering service [phone] or  7 am- 5 pm via Perfect serve or cell phone  Dr.Sudhir Vishnu MD       HPI :     Ant Carrasquillo is a 69 y.o. male presented to   the hospital on 4/8/2025 with fatigue, body ache, fever, poor po  Came to ED, found to have   elevated wbc. Found to have UTI.  Elevated troponin  He is in iv abx.   Also known CHF       PMH/PSH/SH/Family  History:     Past Medical History:   Diagnosis Date    Diabetes mellitus (HCC)     Hyperlipidemia     Hypertension           Medication:     Current Facility-Administered Medications: aspirin EC tablet 81 mg, 81 mg, Oral, Daily  gabapentin (NEURONTIN) capsule 800 mg, 800 mg, Oral, Nightly  hydroxychloroquine (PLAQUENIL) tablet 200 mg, 200 mg, Oral, BID  metoprolol succinate (TOPROL XL) extended release tablet 25 mg, 25 mg, Oral, Daily  sodium chloride flush 0.9 % injection 5-40 mL, 5-40 mL, IntraVENous, 2 times per day  sodium chloride flush 0.9 % injection 5-40 mL, 5-40 mL, IntraVENous, PRN  0.9 % sodium chloride infusion, , IntraVENous, PRN  enoxaparin Sodium (LOVENOX) injection 30 mg, 30 mg, SubCUTAneous, QPM  ondansetron (ZOFRAN-ODT) disintegrating tablet 4 mg, 4 mg, Oral, Q8H PRN **OR** ondansetron (ZOFRAN) injection 4 mg, 4 mg, IntraVENous, Q6H PRN  polyethylene glycol (GLYCOLAX) packet 17 g, 17 g, Oral, Daily PRN  acetaminophen (TYLENOL) tablet 650 mg, 650 mg, Oral, Q6H PRN **OR** acetaminophen (TYLENOL) suppository 650 mg, 650 mg, Rectal, Q6H PRN  0.9 % sodium chloride infusion, , IntraVENous, Continuous  cefTRIAXone (ROCEPHIN) 1,000 mg in sterile water 10 mL IV syringe, 1,000 mg, IntraVENous, Q24H  glucose chewable tablet 16 g, 4 tablet, Oral, PRN  dextrose bolus 10% 125 mL, 125 mL, IntraVENous, PRN **OR** dextrose bolus 10% 250 mL, 250 mL, IntraVENous, PRN  glucagon injection 1 mg, 1 mg, SubCUTAneous, PRN  dextrose 10 % infusion, , IntraVENous, Continuous PRN  insulin lispro (HUMALOG,ADMELOG) injection vial 0-4 Units, 0-4 Units, SubCUTAneous, 4x Daily AC & HS  melatonin disintegrating tablet 10 mg, 10 mg, Oral, Nightly  prochlorperazine (COMPAZINE) injection 10 mg, 10 mg, IntraVENous, Q6H PRN    Vitals :     Vitals:    04/09/25 0549   BP: 92/61   Pulse: 72   Resp:    Temp:    SpO2:           Physical Examination :     appearance: Alert, orientated  Respiratory: no distress  Cardiovascular: no visibly

## 2025-04-09 NOTE — PROGRESS NOTES
Occupational Therapy  Facility/Department: [facility] C3 TELE/MED SURG/ONC  Occupational Therapy Initial Assessment and Treatment Note 1x    Name: Ant Carrasquillo  : 1955  MRN: [id number]  Date of Service: 2025    Discharge Recommendations:  24 hour supervision or assist      Patient Diagnosis(es): The primary encounter diagnosis was Urinary tract infection without hematuria, site unspecified. Diagnoses of Sepsis, due to unspecified organism, unspecified whether acute organ dysfunction present (HCC), Acute cystitis with hematuria, Chronic kidney disease, unspecified CKD stage, and SIRS (systemic inflammatory response syndrome) (HCC) were also pertinent to this visit.  Past Medical History:  has a past medical history of Diabetes mellitus (HCC), Hyperlipidemia, and Hypertension.  Past Surgical History:  has a past surgical history that includes Foot surgery (Right, ); Colonoscopy; eye surgery (Left); Appendectomy; Toe amputation (Right, 3/3/2020); Foot Debridement (Right, 2023); and Toe amputation (Right, 2024).     Assessment  Assessment: Pt seen for OT eval and tx after admission for UTI. He has been hypotensive during hospitalization. BP monitored throughout session. Pt lives spouse and is independent at baseline. During OT, pt completed self-care in bathroom, including 2 grooming tasks and toileting with supervision. He is SBA for bathroom ambulation without device. BP remained stable during activity. Addressed CHF education--pt reported understanding of instructions.  Further OT is not needed d/t pt functioning at baseline.  Recommend initial / at d/c.  Decision Making: Medium Complexity  Activity Tolerance  Activity Tolerance: Patient Tolerated treatment well     Restrictions  Restrictions/Precautions  Restrictions/Precautions: Fall Risk, General Precautions  Activity Level: Up with Assist  Position Activity Restriction  Other Position/Activity Restrictions: IV,  tele    Subjective  General  Chart Reviewed: Yes  Patient assessed for rehabilitation services?: Yes  Additional Pertinent Hx: hx R hallux amputation  Family / Caregiver Present: No  Referring Practitioner: A Alvaro  Diagnosis: UTI  Subjective  Subjective: Pt agreeable to OT.  Reports no pain.  General Comment  Comments: RN approved therapy     Social/Functional History  Social/Functional History  Lives With: Spouse  Type of Home: House  Home Layout: One level  Home Access: Level entry  Bathroom Shower/Tub: Tub/Shower unit, Walk-in shower (One of each)  Bathroom Toilet: Handicap height  Bathroom Equipment: Grab bars in shower  Bathroom Accessibility: Walker accessible  Home Equipment: Walker - Rolling (2WW- thinks he has it somewhere, hasn't used for a long time)  Has the patient had two or more falls in the past year or any fall with injury in the past year?: No  Prior Level of Assist for ADLs: Independent  Prior Level of Assist for Homemaking: Independent  Homemaking Responsibilities: Yes  Prior Level of Assist for Ambulation: Independent household ambulator, with or without device, Independent community ambulator, with or without device  Prior Level of Assist for Transfers: Independent  Active : Yes  Mode of Transportation: Car  Occupation: Retired  Type of Occupation:   Leisure & Hobbies: Projects around the house ([employer], resurfacing deck), fishing    Objective  Temp: 98.4 °F (36.9 °C)  Pulse: 81  Heart Rate Source: Monitor  Respirations: 20  SpO2: 97 %  O2 Device: None (Room air)  BP: 114/84  MAP (Calculated): 94  BP Location: Right upper arm  BP Method: Automatic  Patient Position: Supine  Orthostatic B/P and Pulse?: Yes  Blood Pressure Lyin/67  Blood Pressure Sittin/71  Blood Pressure Standin/67  Pulse Lyin PER MINUTE  Pulse Sittin PER MINUTE  Pulse Standin PER MINUTE  Comment: BP after gait: 113/87,          Safety Devices  Type of Devices: All

## 2025-04-09 NOTE — PROGRESS NOTES
\"Pt is very sick. PRN Zofran was given earlier but it does not make him feel better. He already threw up twice and he is very weak on his knees. Can you please prescribe another PRN for him? HR went up to 140s and RR to 24. Thanks!\"    Perfect serve sent to NP Liliana Rocha.    - PRN Compazine has been ordered. Patient has been placed on telemetry monitoring. Cards was consulted for previous echo EF.

## 2025-04-09 NOTE — CONSULTS
Consult Call Back    Who:Lucio Glover MD   Date:4/9/2025,  Time:2:27 PM    Electronically signed by Nevin Kumari on 4/9/25 at 2:27 PM EDT

## 2025-04-09 NOTE — CARE COORDINATION
CTN contacted [facility] inpatient  on C3 and LVM at [phone] regarding patient eligibility for RPM.     Maren England, MSN, RN, [facility]  Care Transition Nurse  [phone] mobile

## 2025-04-09 NOTE — H&P
[facility] Medicine History & Physical    V 1.6    Date of Admission: 4/8/2025    Date of Service:  Pt seen/examined on 4/8/2025     [x]Admitted to Inpatient with expected LOS greater than two midnights due to medical therapy.  []Placed in Observation status.    Chief Admission Complaint:      Presenting Admission History:      69 y.o. male with PMH significant for DM type II/on oral agents, chronic kidney disease, rheumatoid arthritis on Plaquenil, hypertension, peripheral neuropathy.    He  presented to [facility] ED with c/o feeling weak, tired and general fatigue.  Also has complaint of bodyaches fever and chills for the past 1 to 2 days.  Decreased oral intake and presented to the ED for the above complaints.    Workup in the ED did reveal elevated WBC.  Renal failure with creatinine=3.1  Urine analysis consistent with a UTI.  He did have elevated troponin and elevated BNP but no chest pain or shortness of breath..  EKG reviewed reveals normal sinus rhythm, nonspecific ST and T wave changes.    He is admitted for further evaluation management and IV antibiotics      Assessment/Plan:      Current Principal Problem:  UTI (urinary tract infection)    UTI : Urinalysis reviewed, will follow urine culture.  Started on IV ceftriaxone on 4/8/25 and will continue.  Chest x-ray reviewed and revealed no acute cardiopulmonary issues.    DM type II: he has been on oral agents,.  Will hold at this time, placed on sliding scale insulin adjust as needed to optimize blood sugar control.  Will check A1c     CHFrEF : Echocardiogram completed 2/25 reviewed.  It does reveal severely reduced left systolic function with estimated EF of 20 to 25%  Elevated BNP is noted.  Volume status appears fairly stable at this time.  Chest x-ray did not reveal any pulmonary edema  Do not see any indication for diuresis at this time especially with renal function.  On review of records ARB was held due to renal issues.  Continue  TOE AMPUTATION Right 3/3/2020    AMPUTATION OF RIGHT GREAT TOE performed by Candi Nixon DPM at [facility] OR    TOE AMPUTATION Right 1/4/2024    RIGHT HALLUX AMPUTATION performed by Scout Camp DPM at [facility] OR       Medications Prior to Admission:   Prior to Admission medications    Medication Sig Start Date End Date Taking? Authorizing Provider   Cholecalciferol (VITAMIN D3) 1.25 MG (76172 UT) CAPS Take by mouth   Yes Héctor Navas MD   Apoaequorin (PREVAGEN PO) Take by mouth Unsure of dose   Yes Héctor Navas MD   aspirin 81 MG EC tablet Take 1 tablet by mouth daily 2/20/25  Yes Mathew Goetz MD   metoprolol succinate (TOPROL XL) 25 MG extended release tablet Take 1 tablet by mouth daily 2/20/25  Yes Mathew Goetz MD   glimepiride (AMARYL) 2 MG tablet 1/2 po twice a day and if sugar is higher than 150, increase by 1/2 pill every week up to 1 bid 2/20/25  Yes Minnie Coelho MD   gabapentin (NEURONTIN) 800 MG tablet Take 1 tablet by mouth at bedtime for 180 days. 12/5/24 6/3/25 Yes Minnie Coelho MD   turmeric 500 MG CAPS Take 1 capsule by mouth daily   Yes Héctor Navas MD   hydroxychloroquine (PLAQUENIL) 200 MG tablet  3/1/24  Yes Héctor Navas MD   Continuous Glucose Sensor (DEXCOM G7 SENSOR) MISC Replace every 10 days 2/20/25   Minnie Coelho MD   Boswellia-Glucosamine-Vit D (OSTEO BI-FLEX ONE PER DAY PO) Take by mouth  Patient not taking: Reported on 4/8/2025    Héctor Navas MD   rosuvastatin (CRESTOR) 5 MG tablet Take 1 tablet by mouth nightly  Patient not taking: Reported on 4/8/2025 7/24/24   Minnie Coelho MD   folic acid (FOLVITE) 1 MG tablet Take 1 tablet by mouth daily  Patient not taking: Reported on 3/10/2025 4/2/24   Minnie Coelho MD   ferrous sulfate (IRON 325) 325 (65 Fe) MG tablet Take 1 tablet by mouth every 48 hours  Patient not taking: Reported on 3/10/2025 1/8/24   Mai Rutledge MD       Labs: Personally reviewed and interpreted

## 2025-04-09 NOTE — DISCHARGE INSTR - COC
Continuity of Care Form    Patient Name: Ant Carrasquillo   :  1955  MRN:  [id number]    Admit date:  2025  Discharge date:  ***    Code Status Order: Full Code   Advance Directives:     Admitting Physician:  Irene John MD  PCP: Minnie Coelho MD    Discharging Nurse: ***  Discharging Hospital Unit/Room#: 0348/0348-01  Discharging Unit Phone Number: ***    Emergency Contact:   Extended Emergency Contact Information  Primary Emergency Contact: Janet Carrasquillo  Address: [facility] [employer] [address]  Home Phone: [phone]  Mobile Phone: [phone]  Relation: Spouse    Past Surgical History:  Past Surgical History:   Procedure Laterality Date    APPENDECTOMY      COLONOSCOPY      EYE SURGERY Left     lasik    FOOT DEBRIDEMENT Right 2023    FOOT DEBRIDEMENT TO BONE WITH BONE BIOPSY  INCISION AND DRAINAGE RIGHT FOOT performed by Scout Camp DPM at [facility] OR    FOOT SURGERY Right 2017    big toe    TOE AMPUTATION Right 3/3/2020    AMPUTATION OF RIGHT GREAT TOE performed by Candi Nixon DPM at [facility] OR    TOE AMPUTATION Right 2024    RIGHT HALLUX AMPUTATION performed by Scout Camp DPM at [facility] OR       Immunization History:   Immunization History   Administered Date(s) Administered    COVID-19, PFIZER Bivalent, DO NOT Dilute, (age 12y+), IM, 30 mcg/0.3 mL 2023    COVID-19, PFIZER GRAY top, DO NOT Dilute, (age 12 y+), IM, 30 mcg/0.3 mL 2022    COVID-19, PFIZER PURPLE top, DILUTE for use, (age 12 y+), 30mcg/0.3mL 2021, 2021    Influenza, FLUAD, (age 65 y+), IM, Trivalent PF, 0.5mL 2024    Pneumococcal, PCV20, PREVNAR 20, (age 6w+), IM, 0.5mL 2024       Active Problems:  Patient Active Problem List   Diagnosis Code    Type 2 diabetes mellitus with diabetic autonomic neuropathy, without long-term current use of insulin (HCC) E11.43    Essential hypertension I10    Neuropathy G62.9

## 2025-04-09 NOTE — ACP (ADVANCE CARE PLANNING)
Advance Care Planning     General Advance Care Planning (ACP) Conversation    Date of Conversation: 4/9/2025  Conducted with: Patient with Decision Making Capacity  Other persons present: None    Healthcare Decision Maker:   Primary Decision Maker: NeidaHermesadolfo - Spouse - [phone]       Content/Action Overview:  Has NO ACP documents-Information provided  Reviewed DNR/DNI and patient elects Full Code (Attempt Resuscitation)      Length of Voluntary ACP Conversation in minutes:  <16 minutes (Non-Billable)    Christine Thompson RN

## 2025-04-09 NOTE — CONSULTS
Consult Placed   Consult called to Kari  Who: MMA Card  Date: 4/9/2025  Time: 8:04 AM       Electronically signed by Nevin Kumari on 4/9/2025 at 8:04 AM

## 2025-04-09 NOTE — PROGRESS NOTES
Physical Therapy  Facility/Department: [facility] C3 TELE/MED SURG/ONC  Physical Therapy Initial Assessment/Treatment/Discharge Summary    Name: Ant Carrasquillo  : 1955  MRN: [id number]  Date of Service: 2025    Discharge Recommendations:  24 hour supervision or assist, Home with assist PRN (Initial 24hr supervision (1-2 days))   PT Equipment Recommendations  Equipment Needed: No      Patient Diagnosis(es): The primary encounter diagnosis was Urinary tract infection without hematuria, site unspecified. Diagnoses of Sepsis, due to unspecified organism, unspecified whether acute organ dysfunction present (HCC), Acute cystitis with hematuria, Chronic kidney disease, unspecified CKD stage, and SIRS (systemic inflammatory response syndrome) (HCC) were also pertinent to this visit.  Past Medical History:  has a past medical history of Diabetes mellitus (HCC), Hyperlipidemia, and Hypertension.  Past Surgical History:  has a past surgical history that includes Foot surgery (Right, ); Colonoscopy; eye surgery (Left); Appendectomy; Toe amputation (Right, 3/3/2020); Foot Debridement (Right, 2023); and Toe amputation (Right, 2024).    Assessment  Assessment: Pt admitted to [facility]  w/ dx of UTI. PMHx significant for recent orthostatic hypotension w/ mobility. At baseline pt lives w/ spouse in one-story home w/ level entry, ambulates w/o AD. Pt tolerated bed mobility and transfer training w/o significant changes in BP or report of adverse symptoms. Performed bed mobility w/ supervision, transfer training and gait training w/ SBA. Displayed some furniture surfing when ambulating to bathroom after initial STS, later reported he does that due to chronic L hip pain. No other furniture surfing, LOB, or report of hip pain throughout session. Pt presents w/ good strength and activity tolerance, does not require skilled PT intervention during acute stay. Rec home w/ initial 24hr supervision (1-2 days) when  medically appropriate.  Decision Making: Medium Complexity  Requires PT Follow-Up: No  Activity Tolerance  Activity Tolerance: Patient tolerated evaluation without incident;Patient tolerated treatment well    Plan  Physical Therapy Plan  General Plan: Discharge with evaluation only  Current Treatment Recommendations: Functional mobility training, Transfer training, Endurance training, Gait training, Safety education & training, Patient/Caregiver education & training, Therapeutic activities, Co-Treatment  Safety Devices  Type of Devices: All fall risk precautions in place, Call light within reach, Chair alarm in place, Gait belt, Left in chair, Nurse notified  Restraints  Restraints Initially in Place: No    Restrictions  Restrictions/Precautions  Restrictions/Precautions: Fall Risk, General Precautions  Activity Level: Up with Assist  Position Activity Restriction  Other Position/Activity Restrictions: IV, tele     Subjective  General  Chart Reviewed: Yes  Patient assessed for rehabilitation services?: Yes  Additional Pertinent Hx: CHF  Referring Practitioner: Irene John MD  Referral Date : 04/08/25  Diagnosis: UTI  Follows Commands: Within Functional Limits  General  General Comments: Recent low BP/orthostatic hypotension noted in chart review  Subjective  Subjective: Pt semi-supine upon arrival to therapy, agreeable to working w/ therapy. Reports he's been chilled in recent days but now feels like he's warm/sweating.         Social/Functional History  Social/Functional History  Lives With: Spouse  Type of Home: House  Home Layout: One level  Home Access: Level entry  Bathroom Shower/Tub: Tub/Shower unit, Walk-in shower (One of each)  Bathroom Toilet: Handicap height  Bathroom Equipment: Grab bars in shower  Bathroom Accessibility: Walker accessible  Home Equipment: Walker - Rolling (2WW- thinks he has it somewhere, hasn't used for a long time)  Has the patient had two or more falls in the past year or any fall

## 2025-04-09 NOTE — PROGRESS NOTES
Received patient on bed, alert and oriented x4. HR went to 140s and RR to 24- placed on tele. Pt throws up x2 and unable to take anything down- PRN Compazine given- feels so much better as of this writing.    Call bell within reach. Bed alarm on. Assist x1 to the bathroom. Maintained a calm and comfortable environment. Shift assessment updated and documented.

## 2025-04-10 LAB
ANION GAP SERPL CALCULATED.3IONS-SCNC: 13 MMOL/L (ref 3–16)
BACTERIA UR CULT: ABNORMAL
BASOPHILS # BLD: 0 K/UL (ref 0–0.2)
BASOPHILS NFR BLD: 0.3 %
BUN SERPL-MCNC: 56 MG/DL (ref 7–20)
CALCIUM SERPL-MCNC: 8.6 MG/DL (ref 8.3–10.6)
CHLORIDE SERPL-SCNC: 103 MMOL/L (ref 99–110)
CO2 SERPL-SCNC: 19 MMOL/L (ref 21–32)
CREAT SERPL-MCNC: 3.1 MG/DL (ref 0.8–1.3)
DEPRECATED RDW RBC AUTO: 14 % (ref 12.4–15.4)
EOSINOPHIL # BLD: 0.1 K/UL (ref 0–0.6)
EOSINOPHIL NFR BLD: 0.8 %
GFR SERPLBLD CREATININE-BSD FMLA CKD-EPI: 21 ML/MIN/{1.73_M2}
GLUCOSE BLD-MCNC: 171 MG/DL (ref 70–99)
GLUCOSE BLD-MCNC: 172 MG/DL (ref 70–99)
GLUCOSE BLD-MCNC: 235 MG/DL (ref 70–99)
GLUCOSE BLD-MCNC: 242 MG/DL (ref 70–99)
GLUCOSE SERPL-MCNC: 165 MG/DL (ref 70–99)
HCT VFR BLD AUTO: 24.6 % (ref 40.5–52.5)
HGB BLD-MCNC: 8.2 G/DL (ref 13.5–17.5)
LYMPHOCYTES # BLD: 0.7 K/UL (ref 1–5.1)
LYMPHOCYTES NFR BLD: 7.9 %
MCH RBC QN AUTO: 32 PG (ref 26–34)
MCHC RBC AUTO-ENTMCNC: 33.4 G/DL (ref 31–36)
MCV RBC AUTO: 95.8 FL (ref 80–100)
MONOCYTES # BLD: 0.8 K/UL (ref 0–1.3)
MONOCYTES NFR BLD: 8.9 %
NEUTROPHILS # BLD: 7.7 K/UL (ref 1.7–7.7)
NEUTROPHILS NFR BLD: 82.1 %
ORGANISM: ABNORMAL
PERFORMED ON: ABNORMAL
PLATELET # BLD AUTO: 195 K/UL (ref 135–450)
PMV BLD AUTO: 9.2 FL (ref 5–10.5)
POTASSIUM SERPL-SCNC: 4.6 MMOL/L (ref 3.5–5.1)
RBC # BLD AUTO: 2.57 M/UL (ref 4.2–5.9)
SODIUM SERPL-SCNC: 135 MMOL/L (ref 136–145)
WBC # BLD AUTO: 9.4 K/UL (ref 4–11)

## 2025-04-10 PROCEDURE — 1200000000 HC SEMI PRIVATE

## 2025-04-10 PROCEDURE — 6370000000 HC RX 637 (ALT 250 FOR IP): Performed by: NURSE PRACTITIONER

## 2025-04-10 PROCEDURE — 6370000000 HC RX 637 (ALT 250 FOR IP): Performed by: INTERNAL MEDICINE

## 2025-04-10 PROCEDURE — 6360000002 HC RX W HCPCS: Performed by: INTERNAL MEDICINE

## 2025-04-10 PROCEDURE — 36415 COLL VENOUS BLD VENIPUNCTURE: CPT

## 2025-04-10 PROCEDURE — 2500000003 HC RX 250 WO HCPCS: Performed by: INTERNAL MEDICINE

## 2025-04-10 PROCEDURE — 85025 COMPLETE CBC W/AUTO DIFF WBC: CPT

## 2025-04-10 PROCEDURE — 80048 BASIC METABOLIC PNL TOTAL CA: CPT

## 2025-04-10 RX ADMIN — HYDROXYCHLOROQUINE SULFATE 200 MG: 200 TABLET ORAL at 08:38

## 2025-04-10 RX ADMIN — METOPROLOL SUCCINATE 25 MG: 25 TABLET, EXTENDED RELEASE ORAL at 08:38

## 2025-04-10 RX ADMIN — Medication 10 MG: at 20:14

## 2025-04-10 RX ADMIN — WATER 1000 MG: 1 INJECTION INTRAMUSCULAR; INTRAVENOUS; SUBCUTANEOUS at 08:38

## 2025-04-10 RX ADMIN — SODIUM CHLORIDE, PRESERVATIVE FREE 10 ML: 5 INJECTION INTRAVENOUS at 08:38

## 2025-04-10 RX ADMIN — INSULIN LISPRO 1 UNITS: 100 INJECTION, SOLUTION INTRAVENOUS; SUBCUTANEOUS at 11:51

## 2025-04-10 RX ADMIN — INSULIN LISPRO 1 UNITS: 100 INJECTION, SOLUTION INTRAVENOUS; SUBCUTANEOUS at 20:14

## 2025-04-10 RX ADMIN — GABAPENTIN 800 MG: 400 CAPSULE ORAL at 20:14

## 2025-04-10 RX ADMIN — HYDROXYCHLOROQUINE SULFATE 200 MG: 200 TABLET ORAL at 20:15

## 2025-04-10 RX ADMIN — SODIUM CHLORIDE, PRESERVATIVE FREE 10 ML: 5 INJECTION INTRAVENOUS at 20:15

## 2025-04-10 RX ADMIN — ENOXAPARIN SODIUM 30 MG: 100 INJECTION SUBCUTANEOUS at 18:52

## 2025-04-10 RX ADMIN — ASPIRIN 81 MG: 81 TABLET, COATED ORAL at 08:38

## 2025-04-10 ASSESSMENT — PAIN SCALES - GENERAL
PAINLEVEL_OUTOF10: 0
PAINLEVEL_OUTOF10: 0

## 2025-04-10 NOTE — PROGRESS NOTES
Hospital Medicine Progress Note  V 1.6      Date of Admission: 4/8/2025    Hospital Day: 3      Chief Admission Complaint:  Weakness, fatigue    Subjective:  He reports some initial improvement in fevers and chills.     Presenting Admission History:       69 y.o. male with PMH significant for DM type II/on oral agents, chronic kidney disease, rheumatoid arthritis on Plaquenil, hypertension, peripheral neuropathy.     He presented to [facility] ED with c/o feeling weak, tired and general fatigue.  Also has complaint of bodyaches fever and chills for the past 1 to 2 days.  Decreased oral intake and presented to the ED for the above complaints.     Workup in the ED did reveal elevated WBC.  Renal failure with creatinine=3.1  Urine analysis consistent with a UTI.  He did have elevated troponin and elevated BNP but no chest pain or shortness of breath..  EKG reviewed reveals normal sinus rhythm, nonspecific ST and T wave changes.   He is admitted for further evaluation management and IV antibiotics        Assessment/Plan:       E coli UTI: UCx positive for E.coli, blood cx NGTD, continue IV Ceftriaxone for at least another 24 hours, can convert to po abx at discharge to complete course. He is improving on tx.      Diabetes Mellitus, Type 2: Controlled. Continue bolus Insulin regimen. Monitor serial finger stick blood sugars given risk for toxicity/hypoglycemia due to Insulin and adjust regimen as needed.      Chronic Systolic and Diastolic CHF (HFrEF): This was noted during recent admission, with severely reduced left systolic function EF of 20 to 25%. Ischemic evaluation was likely to be needed but due to severity of WOLF, Nephrology advised deferring any Angiography until further recovery. He had not yet established care with a Cardiologist between admissions. Cardiology consulted her and recommend ongoing medical management for now, with office visit in 2-4 weeks to consider ischemic evaluation if  glycol, acetaminophen **OR** acetaminophen, glucose, dextrose bolus **OR** dextrose bolus, glucagon (rDNA), dextrose, prochlorperazine      Physical Exam Performed:      General appearance:  No apparent distress  Respiratory:  Normal respiratory effort.   Cardiovascular:  Regular rate and rhythm.  Abdomen:  Soft, non-tender, non-distended. No krishna present   Musculoskeletal:  No CVAT. No edema  Neurologic:  Non-focal  Psychiatric:  Alert and oriented    /69   Pulse 94   Temp 99 °F (37.2 °C) (Oral)   Resp 18   Ht 1.778 m (5' 10\")   Wt 91.6 kg (202 lb)   SpO2 97%   BMI 28.98 kg/m²     Telemetry:      Personally reviewed and interpreted telemetry (Rhythm Strip) on 4/10/2025 with the following findings: SR, HR 80s    Diet: ADULT DIET; Regular; 5 carb choices (75 gm/meal)    DVT Prophylaxis: [x]PPx LMWH  []SQ Heparin  []IPC/SCDs  []Eliquis  []Xarelto  []Coumadin  [] Heparin Drip  []Other -      Code status: Full Code    PT/OT Eval Status:   []NOT yet ordered  []Ordered and Pending   [x]Seen with Recommendations for:   []Home independently  [x]Home w/ assist  []HHC  []SNF  []Acute Rehab    Multi-Disciplinary Rounds with Case Management completed on 4/10/2025 with the following recommendations:  Anticipated Discharge Location: [x]Home w/ []HHC vs []SNF  []Acute Rehab  []LTAC  []Hospice  []Other -    Anticipated Discharge Day/Date:  1-2 days  Barriers to Discharge: Final UCx, clinical improvement, Nephrology recommendations  --------------------------------------------------    MDM (any 2 required for High level billing)    A. Problems (any 1)  [x] Acute/Chronic Illness/injury posing ongoing threat to life and/or bodily function without ongoing treatment    [] Severe exacerbation of chronic illness    --------------------------------------------------  B. Risk of Treatment (any 1)    [] Drugs/treatments that require intensive monitoring for toxicity    [] IV ABX (Vancomycin, Aminoglycosides, etc)     []

## 2025-04-10 NOTE — PROGRESS NOTES
Hospital Medicine Progress Note  V 1.6      Date of Admission: 4/8/2025    Hospital Day: 2      Chief Admission Complaint:  Weakness, fatigue    Subjective:  He reports some initial improvement in fevers and chills. Appetite slightly better.     Presenting Admission History:       69 y.o. male with PMH significant for DM type II/on oral agents, chronic kidney disease, rheumatoid arthritis on Plaquenil, hypertension, peripheral neuropathy.     He presented to [facility] ED with c/o feeling weak, tired and general fatigue.  Also has complaint of bodyaches fever and chills for the past 1 to 2 days.  Decreased oral intake and presented to the ED for the above complaints.     Workup in the ED did reveal elevated WBC.  Renal failure with creatinine=3.1  Urine analysis consistent with a UTI.  He did have elevated troponin and elevated BNP but no chest pain or shortness of breath..  EKG reviewed reveals normal sinus rhythm, nonspecific ST and T wave changes.    He is admitted for further evaluation management and IV antibiotics        Assessment/Plan:       UTI: UCx positive for E.coli, continue IV Ceftriaxone pending final UCx.      Diabetes Mellitus, Type 2: Controlled. Continue bolus Insulin regimen. Monitor serial finger stick blood sugars given risk for toxicity/hypoglycemia due to Insulin and adjust regimen as needed.      Chronic Systolic and Diastolic CHF (HFrEF): This was noted during recent admission, with severely reduced left systolic function EF of 20 to 25%. Ischemic evaluation was likely to be needed but due to severity of WOLF, Nephrology advised deferring any Angiography until further recovery. He had not yet established care with a Cardiologist between admissions. Cardiology consulted her and recommend ongoing medical management for now, with office visit in 2-4 weeks to consider ischemic evaluation if Cr continues to improve.   Continue on Toprol-XL. ACEi/ARB/ARNI contraindicated due to  of incubation. 02/14/2024 02:57 PM     Lab Results   Component Value Date/Time    BLOODCULT2  04/08/2025 03:27 PM     No Growth to date.  Any change in status will be called.     Organism:   Lab Results   Component Value Date/Time    ORG Escherichia coli 04/08/2025 02:27 PM         Mathew Goetz MD

## 2025-04-10 NOTE — PROGRESS NOTES
Ph: [phone], Fax: [phone]           [facility]               Reason for admission:                 UTI/WOLF/nephrolithiasis    Brief Summary :     Ant Carrasquillo is being seen by nephrology for WOLF.  He is known to have recent hospital discharge after treatment for WOLF presented with urinary tract infection lethargy WOLF though his kidney numbers was recovering last hospitalization      Interval History and plan:         Presented with WOLF  BP low  Cr high   Has significant UTI/pyelonephritis  CT scan of the abdomen  Shows 3 mm calculus from lower pole of the right kidney into the region of right pelvis  The calculus has migrated  Does not have hydronephrosis  Cr stable    Plan:  Will consider urology consult or at least usual follow-up closely after discharge  P.o. liquid is encouraged  No indication for IV fluids today  Encourage po                        Assessment :     Acute Kidney Injury on CKD IIIb  Cr 3.3 on consult  Presented with cr of 8.7/ on 2/25 admission, cr was down to 2.9 on last hospital DC  Due to ATN from diarrhea/vomiting  Has history of recurrent WOLF  CKD baseline in the past with creatinine 1.5- 1.9    Pr/cr ratio 92 mg/gm  Has DM   CT abdomen- no kidney  abnormality, but b/l nephrolithiasis  UA 2/25- coarse casts    RF- +ve, C3 CR normal, ANCA-ve, ALEXANDRA-ve, K/L- 0.68, not significant     HFrEF  EF 20-25 %      Hypertension   BP: (104-106)/(69-76)  Pulse:  [84-94]   BP goal inpatient 130-140 systolic inpatient  BP low          [facility] would like to thank Amber Saleh MD   for opportunity to serve this patient      Please call with questions at-   24 Hrs Answering service [phone] or  7 am- 5 pm via Perfect serve or cell phone  Dr.Sudhir Vishnu MD       HPI :     Ant Carrasquillo is a 69 y.o. male presented to   the hospital on 4/8/2025 with fatigue, body ache, fever, poor po  Came to ED, found to have   elevated wbc. Found to have

## 2025-04-10 NOTE — PLAN OF CARE
Standard safety measures in place, ambulating to BR with steady gait, standby assist.  Problem: Safety - Adult  Goal: Free from fall injury  Outcome: Progressing

## 2025-04-10 NOTE — CARE COORDINATION
Chart reviewed day 2. Care managed by nephrology and IM. Creat stable. Encouraging po fluid intake. Therapy with recs for home with assist. D/c pending continued improvement. No CM needs identified. Christine Thompson RN

## 2025-04-10 NOTE — PROGRESS NOTES
Pt assessment completed and charted. VSS, pt on RA. Patient is a/o. IV site patent/flushed, saline locked.     Safety Measures in place:   Bed in lowest position and wheels locked.   Call light within reach.   Bedside table within reach.   Non-skid socks in place.   Pt denies any other needs at this time.    Pt calls out appropriately.  Patient in stable condition when RN left room.

## 2025-04-11 VITALS
RESPIRATION RATE: 18 BRPM | TEMPERATURE: 98.2 F | SYSTOLIC BLOOD PRESSURE: 106 MMHG | DIASTOLIC BLOOD PRESSURE: 75 MMHG | OXYGEN SATURATION: 97 % | WEIGHT: 200.2 LBS | HEART RATE: 75 BPM | HEIGHT: 70 IN | BODY MASS INDEX: 28.66 KG/M2

## 2025-04-11 LAB
ANION GAP SERPL CALCULATED.3IONS-SCNC: 11 MMOL/L (ref 3–16)
BUN SERPL-MCNC: 47 MG/DL (ref 7–20)
CALCIUM SERPL-MCNC: 9.2 MG/DL (ref 8.3–10.6)
CHLORIDE SERPL-SCNC: 102 MMOL/L (ref 99–110)
CO2 SERPL-SCNC: 19 MMOL/L (ref 21–32)
CREAT SERPL-MCNC: 2.7 MG/DL (ref 0.8–1.3)
GFR SERPLBLD CREATININE-BSD FMLA CKD-EPI: 25 ML/MIN/{1.73_M2}
GLUCOSE BLD-MCNC: 144 MG/DL (ref 70–99)
GLUCOSE BLD-MCNC: 236 MG/DL (ref 70–99)
GLUCOSE BLD-MCNC: 278 MG/DL (ref 70–99)
GLUCOSE SERPL-MCNC: 235 MG/DL (ref 70–99)
PERFORMED ON: ABNORMAL
POTASSIUM SERPL-SCNC: 4.8 MMOL/L (ref 3.5–5.1)
SODIUM SERPL-SCNC: 132 MMOL/L (ref 136–145)

## 2025-04-11 PROCEDURE — 2500000003 HC RX 250 WO HCPCS: Performed by: STUDENT IN AN ORGANIZED HEALTH CARE EDUCATION/TRAINING PROGRAM

## 2025-04-11 PROCEDURE — 6370000000 HC RX 637 (ALT 250 FOR IP): Performed by: INTERNAL MEDICINE

## 2025-04-11 PROCEDURE — 6360000002 HC RX W HCPCS: Performed by: STUDENT IN AN ORGANIZED HEALTH CARE EDUCATION/TRAINING PROGRAM

## 2025-04-11 PROCEDURE — 80048 BASIC METABOLIC PNL TOTAL CA: CPT

## 2025-04-11 PROCEDURE — 2500000003 HC RX 250 WO HCPCS: Performed by: INTERNAL MEDICINE

## 2025-04-11 PROCEDURE — 36415 COLL VENOUS BLD VENIPUNCTURE: CPT

## 2025-04-11 RX ORDER — CEFDINIR 300 MG/1
300 CAPSULE ORAL 2 TIMES DAILY
Qty: 14 CAPSULE | Refills: 0 | Status: SHIPPED | OUTPATIENT
Start: 2025-04-11 | End: 2025-04-18

## 2025-04-11 RX ADMIN — HYDROXYCHLOROQUINE SULFATE 200 MG: 200 TABLET ORAL at 08:48

## 2025-04-11 RX ADMIN — INSULIN LISPRO 1 UNITS: 100 INJECTION, SOLUTION INTRAVENOUS; SUBCUTANEOUS at 12:38

## 2025-04-11 RX ADMIN — SODIUM CHLORIDE, PRESERVATIVE FREE 10 ML: 5 INJECTION INTRAVENOUS at 08:53

## 2025-04-11 RX ADMIN — WATER 1000 MG: 1 INJECTION INTRAMUSCULAR; INTRAVENOUS; SUBCUTANEOUS at 08:49

## 2025-04-11 RX ADMIN — ASPIRIN 81 MG: 81 TABLET, COATED ORAL at 08:48

## 2025-04-11 RX ADMIN — METOPROLOL SUCCINATE 25 MG: 25 TABLET, EXTENDED RELEASE ORAL at 08:48

## 2025-04-11 NOTE — PROGRESS NOTES
History:     Past Medical History:   Diagnosis Date    Diabetes mellitus (HCC)     Hyperlipidemia     Hypertension           Medication:     Current Facility-Administered Medications: cefTRIAXone (ROCEPHIN) 1,000 mg in sterile water 10 mL IV syringe, 1,000 mg, IntraVENous, Q24H  aspirin EC tablet 81 mg, 81 mg, Oral, Daily  gabapentin (NEURONTIN) capsule 800 mg, 800 mg, Oral, Nightly  hydroxychloroquine (PLAQUENIL) tablet 200 mg, 200 mg, Oral, BID  metoprolol succinate (TOPROL XL) extended release tablet 25 mg, 25 mg, Oral, Daily  sodium chloride flush 0.9 % injection 5-40 mL, 5-40 mL, IntraVENous, 2 times per day  sodium chloride flush 0.9 % injection 5-40 mL, 5-40 mL, IntraVENous, PRN  0.9 % sodium chloride infusion, , IntraVENous, PRN  enoxaparin Sodium (LOVENOX) injection 30 mg, 30 mg, SubCUTAneous, QPM  ondansetron (ZOFRAN-ODT) disintegrating tablet 4 mg, 4 mg, Oral, Q8H PRN **OR** ondansetron (ZOFRAN) injection 4 mg, 4 mg, IntraVENous, Q6H PRN  polyethylene glycol (GLYCOLAX) packet 17 g, 17 g, Oral, Daily PRN  acetaminophen (TYLENOL) tablet 650 mg, 650 mg, Oral, Q6H PRN **OR** acetaminophen (TYLENOL) suppository 650 mg, 650 mg, Rectal, Q6H PRN  glucose chewable tablet 16 g, 4 tablet, Oral, PRN  dextrose bolus 10% 125 mL, 125 mL, IntraVENous, PRN **OR** dextrose bolus 10% 250 mL, 250 mL, IntraVENous, PRN  glucagon injection 1 mg, 1 mg, SubCUTAneous, PRN  dextrose 10 % infusion, , IntraVENous, Continuous PRN  insulin lispro (HUMALOG,ADMELOG) injection vial 0-4 Units, 0-4 Units, SubCUTAneous, 4x Daily AC & HS  melatonin disintegrating tablet 10 mg, 10 mg, Oral, Nightly  prochlorperazine (COMPAZINE) injection 10 mg, 10 mg, IntraVENous, Q6H PRN    Vitals :     Vitals:    04/11/25 0845   BP: 117/77   Pulse: 84   Resp: 18   Temp: 98.6 °F (37 °C)   SpO2: 98%          Physical Examination :     appearance: Alert, orientated  Respiratory: no distress  Cardiovascular: no visibly  raised JVD, Edema none   Abdomen: -   soft  Other relevant findings: -      Labs :     CBC:   Recent Labs     04/08/25  1010 04/09/25  0524 04/10/25  0731   WBC 16.8* 16.3* 9.4   HGB 9.9* 7.7* 8.2*   HCT 30.5* 23.8* 24.6*    190 195     BMP:    Recent Labs     04/09/25  0524 04/10/25  0730 04/11/25  0917   * 135* 132*   K 5.1 4.6 4.8    103 102   CO2 19* 19* 19*   BUN 47* 56* 47*   CREATININE 3.3* 3.1* 2.7*   GLUCOSE 200* 165* 235*     Lab Results   Component Value Date/Time    COLORU Yellow 04/08/2025 01:54 PM    NITRU Negative 04/08/2025 01:54 PM    GLUCOSEU Negative 04/08/2025 01:54 PM    KETUA Negative 04/08/2025 01:54 PM    UROBILINOGEN 0.2 04/08/2025 01:54 PM    BILIRUBINUR Negative 04/08/2025 01:54 PM        ----------------------------------------------------------  Please call with questions at      24 Hrs Answering service [phone]  Perfect serve, or cell phone 7 am - 5pm  Preston Mares MD  [facility]

## 2025-04-11 NOTE — DISCHARGE SUMMARY
Hospital Medicine Discharge Summary    Patient: Ant Carrasquillo   : 1955     Hospital:  [facility]  Admit Date: 2025   Discharge Date:   25   Disposition:  Home   Code status:  Full  Condition at Discharge: Stable  Primary Care Provider: Minnie Coelho MD    Admitting Provider: Irene John MD  Discharge Provider: Amber Saleh MD     Discharge Diagnoses:      Active Hospital Problems    Diagnosis     UTI (urinary tract infection) [N39.0]        Presenting Admission History:      69 y.o. male with PMH significant for DM type II/on oral agents, chronic kidney disease, rheumatoid arthritis on Plaquenil, hypertension, peripheral neuropathy.     He presented to [facility] ED with c/o feeling weak, tired and general fatigue.  Also has complaint of bodyaches fever and chills for the past 1 to 2 days.  Decreased oral intake and presented to the ED for the above complaints.     Workup in the ED did reveal elevated WBC.  Renal failure with creatinine=3.1  Urine analysis consistent with a UTI.  He did have elevated troponin and elevated BNP but no chest pain or shortness of breath..  EKG reviewed reveals normal sinus rhythm, nonspecific ST and T wave changes.   He is admitted for further evaluation management and IV antibiotics     Assessment/Plan:      E coli UTI: UCx positive for E.coli, blood cx NGTD, continue IV Ceftriaxone for at least another 24 hours, can convert to po abx at discharge to complete course. He is improving on tx.      Diabetes Mellitus, Type 2: Controlled. Continue bolus Insulin regimen. Monitor serial finger stick blood sugars given risk for toxicity/hypoglycemia due to Insulin and adjust regimen as needed.      Chronic Systolic and Diastolic CHF (HFrEF): This was noted during recent admission, with severely reduced left systolic function EF of 20 to 25%. Ischemic evaluation was likely to be needed but due to severity of WOLF, Nephrology  Electronically signed by Dominic Go MD      Consults:     IP CONSULT TO HOSPITALIST  IP CONSULT TO NEPHROLOGY  IP CONSULT TO CARDIOLOGY    Labs:     Recent Labs     04/09/25  0524 04/10/25  0731   WBC 16.3* 9.4   HGB 7.7* 8.2*   HCT 23.8* 24.6*    195     Recent Labs     04/09/25  0524 04/10/25  0730 04/11/25  0917   * 135* 132*   K 5.1 4.6 4.8    103 102   CO2 19* 19* 19*   BUN 47* 56* 47*   CREATININE 3.3* 3.1* 2.7*   CALCIUM 8.8 8.6 9.2     No results for input(s): \"PROBNP\", \"TROPHS\" in the last 72 hours.  Recent Labs     04/09/25  0524   LABA1C 6.1     Recent Labs     04/09/25  0524   AST 31   ALT 20   BILITOT 0.3   ALKPHOS 84     Recent Labs     04/08/25  1203   LACTA 1.2       Urine Cultures:   Lab Results   Component Value Date/Time    LABURIN >100,000 CFU/ml 04/08/2025 02:27 PM     Blood Cultures:   Lab Results   Component Value Date/Time    BC  04/08/2025 03:27 PM     No Growth to date.  Any change in status will be called.     Lab Results   Component Value Date/Time    BLOODCULT2  04/08/2025 03:27 PM     No Growth to date.  Any change in status will be called.     Organism:   Lab Results   Component Value Date/Time    ORG Escherichia coli 04/08/2025 02:27 PM       Signed:    Amber Saleh MD

## 2025-04-11 NOTE — PLAN OF CARE
Problem: Chronic Conditions and Co-morbidities  Goal: Patient's chronic conditions and co-morbidity symptoms are monitored and maintained or improved  Outcome: Progressing     Problem: Safety - Adult  Goal: Free from fall injury  Outcome: Progressing     Problem: Skin/Tissue Integrity  Goal: Skin integrity remains intact  Description: 1.  Monitor for areas of redness and/or skin breakdown  2.  Assess vascular access sites hourly  3.  Every 4-6 hours minimum:  Change oxygen saturation probe site  4.  Every 4-6 hours:  If on nasal continuous positive airway pressure, respiratory therapy assess nares and determine need for appliance change or resting period  Outcome: Progressing     Problem: Musculoskeletal - Adult  Goal: Return mobility to safest level of function  Outcome: Progressing  Goal: Maintain proper alignment of affected body part  Outcome: Progressing  Goal: Return ADL status to a safe level of function  Outcome: Progressing     Problem: Gastrointestinal - Adult  Goal: Minimal or absence of nausea and vomiting  Outcome: Progressing  Goal: Maintains or returns to baseline bowel function  Outcome: Progressing  Goal: Maintains adequate nutritional intake  Outcome: Progressing  Goal: Establish and maintain optimal ostomy function  Outcome: Progressing

## 2025-04-12 LAB
BACTERIA BLD CULT ORG #2: NORMAL
BACTERIA BLD CULT: NORMAL

## 2025-04-14 ENCOUNTER — CARE COORDINATION (OUTPATIENT)
Dept: CARE COORDINATION | Age: 70
End: 2025-04-14

## 2025-04-14 DIAGNOSIS — E78.5 HYPERLIPIDEMIA ASSOCIATED WITH TYPE 2 DIABETES MELLITUS: ICD-10-CM

## 2025-04-14 DIAGNOSIS — E11.69 HYPERLIPIDEMIA ASSOCIATED WITH TYPE 2 DIABETES MELLITUS: ICD-10-CM

## 2025-04-14 DIAGNOSIS — D63.1 ANEMIA OF RENAL DISEASE: Primary | ICD-10-CM

## 2025-04-14 DIAGNOSIS — N39.0 URINARY TRACT INFECTION WITHOUT HEMATURIA, SITE UNSPECIFIED: Primary | ICD-10-CM

## 2025-04-14 DIAGNOSIS — N18.9 ANEMIA OF RENAL DISEASE: Primary | ICD-10-CM

## 2025-04-14 PROCEDURE — 1111F DSCHRG MED/CURRENT MED MERGE: CPT | Performed by: INTERNAL MEDICINE

## 2025-04-14 RX ORDER — FOLIC ACID 1 MG/1
1 TABLET ORAL DAILY
Qty: 30 TABLET | Refills: 0 | Status: SHIPPED | OUTPATIENT
Start: 2025-04-14

## 2025-04-14 RX ORDER — ROSUVASTATIN CALCIUM 5 MG/1
5 TABLET, COATED ORAL NIGHTLY
Qty: 90 TABLET | Refills: 1 | Status: SHIPPED | OUTPATIENT
Start: 2025-04-14

## 2025-04-14 RX ORDER — FERROUS SULFATE 325(65) MG
325 TABLET ORAL
Qty: 30 TABLET | Refills: 0 | Status: SHIPPED | OUTPATIENT
Start: 2025-04-14

## 2025-04-14 NOTE — CARE COORDINATION
Care Transitions Note    Initial Call - Call within 2 business days of discharge: Yes    Patient Current Location:  Home: [address] [place] [id number]    Care Transition Nurse contacted the patient by telephone to perform post hospital discharge assessment, verified name and  as identifiers.  Provided introduction to self, and explanation of the Care Transition Nurse role.    Patient: Ant Carrasquillo    Patient : 1955   MRN: [id number]    Reason for Admission: UTI, NN   Discharge Date: 25  RURS: Readmission Risk Score: 19.6      Last Discharge Facility       Date Complaint Diagnosis Description Type Department Provider    25 Cold Symptoms Urinary tract infection without hematuria, site unspecified ... ED to Hosp-Admission (Discharged) (ADMITTED) Amber Moscoso MD; Khang Alonso...            Was this an external facility discharge? No    Additional needs identified to be addressed with provider   High priority: pt states he does not have folic acid, crestor or iron - asking for rx to be sent to [facility] in [facility].  - pt to see you tomorrow for HFU. Pt no longer using dexcom sensors due to expense. No longer taking Vitamin D3 Thank you              Method of communication with provider: chart routing.    Patients top risk factors for readmission: level of motivation and medical condition-UTI    Interventions to address risk factors:   Education: attempted to provide.     Care Summary Note: Spoke with pt who states he is \"feeling ok.\" Pt states he is ok with writer asking questions but is resistant in giving information or answers. States he is having \"irritation\" when urinating. Denies fever or chills. Denies blood or urine color changes. Denies any difficulty getting around. Meds reviewed to writer best ability. Pt missing iron, folic acid and Crestor - will ask PCP to write a script for these and send to [facility]. Pt to see provider tomorrow. Will

## 2025-04-18 ENCOUNTER — CARE COORDINATION (OUTPATIENT)
Dept: CARE COORDINATION | Age: 70
End: 2025-04-18

## 2025-04-18 NOTE — PROGRESS NOTES
Physician Progress Note      PATIENT:               TEE CAMPA  CSN #:                  [id number]  :                       1955  ADMIT DATE:       2025 9:31 AM  DISCH DATE:        2025 2:14 PM  RESPONDING  PROVIDER #:        Amber Saleh MD          QUERY TEXT:    Dear Dr. Saleh,  Sepsis was documented in the medical record in ED .  The diagnosis was not   noted in subsequent documentation. Pt had elevated WBC, tachycardia,   hypotension, elevated creat and a UTI was confirmed on culture.  Please   clarify the status of this condition.    The clinical indicators include:   ED for nausea, vomiting, fatigue, weakness, chills x 24/hrs  per wife \"says that he has been laying around for the past 24 hours and not   eating\"  Hx DM, HTN, HLD, Hx ESRD, RA on Plaquenil  B/P=88/61, 87/59  IU=874, 93  WBC=16.8, Neutrophils=15.2, creat=3.1, (later  Neph consult notes WOLF- from   [facility] from diarrhea and vomiting-CKD baseline in the past with creatinine 1.5-   1.9)  UA+, Urine culture->100,000 E-coli  1L IVF bolus given in ED, Tylenol, Rocephin, blood cultures, urine culture,   Nephrology consult, monitor labs   WBC=16.3, Neutrophils=14.7, Resp=24, HR=98, 117  Options provided:  -- Sepsis due to UTI present on admission confirmed after study  -- Sepsis ruled out after study and UTI only  -- Other - I will add my own diagnosis  -- Disagree - Not applicable / Not valid  -- Disagree - Clinically unable to determine / Unknown  -- Refer to Clinical Documentation Reviewer    PROVIDER RESPONSE TEXT:    Sepsis due to UTI present on admission confirmed after study.    Query created by: Cate Saldana on 4/10/2025 10:10 AM      Electronically signed by:  Amber Saleh MD 2025 8:41 AM

## 2025-04-18 NOTE — CARE COORDINATION
Care Transitions Note    Follow Up Call     Attempted to reach patient for transitions of care follow up.  Unable to reach patient.      Outreach Attempts:   HIPAA compliant voicemail left for patient.     Care Summary Note: Follow up outreach call attempt, no answer.  CTN left VM with contact information and request for return call.  CTN will continue with outreach call attempts.      Follow Up Appointment:   Future Appointments         Provider Specialty Dept Phone    5/6/2025 9:45 AM Alan Lerner DO Internal Medicine [phone]    5/9/2025 11:15 AM Jalen Kaufman MD Cardiology [phone]            Plan for follow-up call in 2-5 days based on severity of symptoms and risk factors. Plan for next call: symptom management-UTI, HFU with PCP 4/15?    STEPHANIE Tee, RN   Care Transition Nurse  Mobile: [phone]'

## 2025-04-22 ENCOUNTER — CARE COORDINATION (OUTPATIENT)
Dept: CASE MANAGEMENT | Age: 70
End: 2025-04-22

## 2025-04-22 NOTE — CARE COORDINATION
Care Transitions Note    Final Call      Reason for Admission: UTI,     Attempted to reach patient for transitions of care follow up.  Unable to reach patient.      Outreach Attempts:   Multiple attempts to contact patient at phone numbers on file.   HIPAA compliant voicemail left for patient.     Patient closed (unable to reach patient) from the Care Transitions program on 4/22.  Patient/family  unable to reach .      Handoff:   Patient was not referred to the ACM team due to unable to contact patient.      Care Summary Note: [facility] 2nd attempted outreach. Left message and contact information for call back. No further outreach will be attempted.     Nancy Kay LPN  Care Coordinator     Upcoming Appointments:    Future Appointments         Provider Specialty Dept Phone    5/6/2025 9:45 AM Alan Lerner DO Internal Medicine [phone]    5/9/2025 11:15 AM Jalen Kaufman MD Cardiology [phone]            Nancy Kay LPN

## 2025-04-30 DIAGNOSIS — E11.43 TYPE 2 DIABETES MELLITUS WITH DIABETIC AUTONOMIC NEUROPATHY, WITHOUT LONG-TERM CURRENT USE OF INSULIN (HCC): ICD-10-CM

## 2025-05-01 RX ORDER — GLIMEPIRIDE 2 MG/1
TABLET ORAL
Qty: 60 TABLET | Refills: 0 | Status: SHIPPED | OUTPATIENT
Start: 2025-05-01

## 2025-05-01 NOTE — TELEPHONE ENCOUNTER
Refill request for  glimepiride (AMARYL) 2 MG tablet medication.     Name of Pharmacy-  [facility] [id number] - [address]      Last visit - 3/10/25     Pending visit - 5/6/25    Last refill -2/20/25

## 2025-05-05 NOTE — PROGRESS NOTES
[facility] - Progress/Follow-up Note        REASON FOR FOLLOW UP  No chief complaint on file.        INTERVAL HISTORY  Mr. Carrasquillo is a 69 y.o. male presenting for follow up. He has a history of HLD, PAF, HTN, cardiomyopathy, DM2.     In 12/2023 he was admitted from home with septic shock due to diabetic ulcer. He developed A-fib with RVR which is a new diagnosis for him. A-fib was confirmed on ECG. He spontaneously converted out of it. Echo 12/28/23 noted EF 35-40%, right to left interatrial shunt consistent with PFO.    LOV, 3/8/2024, He repots since his hospitalization his infection in his foot has healed with antibiotics. He says he is walking and getting around fine with a walker. He reports he is still having trouble controlling his blood pressure.     Echo 2/17/25 noted an LVEF of 20-25%, mild mitral and tricuspid reg, Ao root diameter is 4.2 cm, Ao ascending diameter is 4.0 cm.     Today, 5/9/2025, ***      REVIEW OF SYSTEMS  10 point ROS done and negative other than HPI      Details of patient's medical, family and social history reviewed and updated as necessary.      CURRENT CARDIAC MEDICATIONS  Aspirin 81  Toprol xl 25  Crestor 10      VITALS  There were no vitals taken for this visit.  No intake or output data in the 24 hours ending 05/05/25 0945    PHYSICAL EXAM  General appearance - alert, cooperative, no distress, appears stated age  Neck - Supple, symmetrical, trachea midline, no adenopathy, thyroid: not enlarged, symmetric, no tenderness/mass/nodules, no carotid bruit or JVD  Lungs - Clear to auscultation bilaterally, respirations unlabored  Chest wall - No tenderness or deformity  Heart - Regular rate and rhythm, S1, S2 normal, no murmur, no rub or gallop  Abdomen - Soft, non-tender, bowel sounds active all four quadrants,  no masses, no organomegaly  Extremities - Extremities normal, atraumatic, no cyanosis or edema  Pulses - 2+ and symmetric upper and lower extremities  Skin - 
appearance - alert, cooperative, no distress, appears stated age  Neck - Supple, symmetrical, trachea midline, no adenopathy, thyroid: not enlarged, symmetric, no tenderness/mass/nodules, no carotid bruit or JVD  Lungs - Clear to auscultation bilaterally, respirations unlabored  Chest wall - No tenderness or deformity  Heart - Regular rate and rhythm, S1, S2 normal, no murmur, no rub or gallop  Abdomen - Soft, non-tender, bowel sounds active all four quadrants,  no masses, no organomegaly  Extremities - trace edema, Extremities normal, atraumatic, no cyanosis   Pulses - 2+ and symmetric upper and lower extremities  Skin - Skin color, texture, turgor normal, no rashes or lesions      LABS:  Lab Results   Component Value Date/Time    WBC 5.7 05/06/2025 10:25 AM    RBC 3.39 05/06/2025 10:25 AM    HGB 10.5 05/06/2025 10:25 AM    HCT 31.9 05/06/2025 10:25 AM    MCV 94.0 05/06/2025 10:25 AM    RDW 14.3 05/06/2025 10:25 AM     05/06/2025 10:25 AM     Lab Results   Component Value Date/Time     05/06/2025 10:25 AM    K 4.9 05/06/2025 10:25 AM    K 4.6 04/10/2025 07:30 AM     05/06/2025 10:25 AM    CO2 23 05/06/2025 10:25 AM    BUN 34 05/06/2025 10:25 AM    CREATININE 2.2 05/06/2025 10:25 AM    AGRATIO 1.5 04/09/2025 05:24 AM    LABGLOM 32 05/06/2025 10:25 AM    LABGLOM 47 02/14/2024 02:57 PM    LABGLOM 83 04/04/2023 12:00 AM    GLUCOSE 103 05/06/2025 10:25 AM    CALCIUM 9.6 05/06/2025 10:25 AM    BILITOT 0.3 04/09/2025 05:24 AM    ALKPHOS 84 04/09/2025 05:24 AM    AST 31 04/09/2025 05:24 AM    ALT 20 04/09/2025 05:24 AM     Lab Results   Component Value Date    CHOL 90 07/23/2024    CHOL 103 04/03/2023     Lab Results   Component Value Date    TRIG 82 07/23/2024    TRIG 107 04/03/2023     No components found for: \"LDLCHOLESTEROL\", \"LDLCALC\"        CARDIAC STUDIES    ECG 4/9/2025        Echo 2/17/2025    Image quality is adequate.    Left Ventricle: Severely reduced left ventricular systolic function with

## 2025-05-06 ENCOUNTER — OFFICE VISIT (OUTPATIENT)
Dept: INTERNAL MEDICINE CLINIC | Age: 70
End: 2025-05-06

## 2025-05-06 VITALS
HEART RATE: 74 BPM | BODY MASS INDEX: 28.52 KG/M2 | OXYGEN SATURATION: 96 % | DIASTOLIC BLOOD PRESSURE: 88 MMHG | WEIGHT: 198.8 LBS | TEMPERATURE: 97.7 F | SYSTOLIC BLOOD PRESSURE: 138 MMHG

## 2025-05-06 DIAGNOSIS — M25.552 CHRONIC LEFT HIP PAIN: ICD-10-CM

## 2025-05-06 DIAGNOSIS — M06.9 RHEUMATOID ARTHRITIS INVOLVING MULTIPLE JOINTS (HCC): ICD-10-CM

## 2025-05-06 DIAGNOSIS — I50.20 HEART FAILURE WITH REDUCED EJECTION FRACTION (HCC): ICD-10-CM

## 2025-05-06 DIAGNOSIS — E11.42 TYPE 2 DIABETES MELLITUS WITH DIABETIC POLYNEUROPATHY, WITHOUT LONG-TERM CURRENT USE OF INSULIN (HCC): Primary | ICD-10-CM

## 2025-05-06 DIAGNOSIS — N18.30 STAGE 3 CHRONIC KIDNEY DISEASE, UNSPECIFIED WHETHER STAGE 3A OR 3B CKD (HCC): ICD-10-CM

## 2025-05-06 DIAGNOSIS — G89.29 CHRONIC LEFT HIP PAIN: ICD-10-CM

## 2025-05-06 PROBLEM — E87.1 HYPONATREMIA: Status: RESOLVED | Noted: 2023-12-28 | Resolved: 2025-05-06

## 2025-05-06 PROBLEM — M25.422 EFFUSION OF BURSA OF LEFT ELBOW: Status: RESOLVED | Noted: 2024-05-01 | Resolved: 2025-05-06

## 2025-05-06 PROBLEM — N17.9 ACUTE KIDNEY INJURY SUPERIMPOSED ON CKD: Status: RESOLVED | Noted: 2023-12-28 | Resolved: 2025-05-06

## 2025-05-06 PROBLEM — R65.21 SEPTIC SHOCK (HCC): Status: RESOLVED | Noted: 2023-12-27 | Resolved: 2025-05-06

## 2025-05-06 PROBLEM — E87.5 HYPERKALEMIA: Status: RESOLVED | Noted: 2025-02-14 | Resolved: 2025-05-06

## 2025-05-06 PROBLEM — A41.9 SEPTIC SHOCK (HCC): Status: RESOLVED | Noted: 2023-12-27 | Resolved: 2025-05-06

## 2025-05-06 PROBLEM — N17.9 ACUTE RENAL FAILURE (ARF): Status: RESOLVED | Noted: 2025-02-15 | Resolved: 2025-05-06

## 2025-05-06 PROBLEM — M86.9 OSTEOMYELITIS OF RIGHT FOOT (HCC): Status: RESOLVED | Noted: 2024-01-02 | Resolved: 2025-05-06

## 2025-05-06 PROBLEM — N39.0 UTI (URINARY TRACT INFECTION): Status: RESOLVED | Noted: 2025-04-08 | Resolved: 2025-05-06

## 2025-05-06 PROBLEM — N18.9 ACUTE KIDNEY INJURY SUPERIMPOSED ON CKD: Status: RESOLVED | Noted: 2023-12-28 | Resolved: 2025-05-06

## 2025-05-06 RX ORDER — GABAPENTIN 600 MG/1
600 TABLET ORAL NIGHTLY
Qty: 90 TABLET | Refills: 0 | Status: SHIPPED | OUTPATIENT
Start: 2025-05-06 | End: 2025-08-04

## 2025-05-06 NOTE — PROGRESS NOTES
[facility] Office  [address]  [address]  [place], [place] [address]  Tel: [phone]    Ant Carrasquillo  1955  male    CC:   Chief Complaint   Patient presents with    New Patient       HPI:   Patient is a 69-year-old male presents today to establish care with me.  Does have an acute complaint of pain in his left hip.    Left hip pain for the past year, did see orthopedics completed physical therapy continues to have pain is helpful to walk long distances with his wife again.  Had some improvement with therapy but has not followed up with orthopedics since then.    Recent hospitalizations reviewed both in February as well as April.  Also saw nephrology recently to establish care reviewed office note.    Medical Hx:  CKD - relatively new diagnosis with recent hospitalizations; baseline creatinine around 3 follows with Dr. Vishnu MEEHAN - Dr. Gupta [id number]; on plaquenil, RA for 20-30 years; elbows, shoulders, knees and mostly hands. Needs alternative to hydroxychloroquine to protect kidneys  Neuropathy - gabapentin 800 mg qHS; diabetes as well as work work history frequent periods of standing  T2DM - glimepiride 1 mg as needed; fingersticks fasting qAM, before and after bfast/dinner 4 times a day; last A1c 4/2025 6.1  HFrEF - metoprolol, ASA , statin; to see Dr. Kaufman this Friday    Surgical Hx:  L foot big toe amputation (distal tip) - infection  R foot nonhealing wound with surgery, and hardware    Social Hx:  Used to work in aviation maintenance. No smoking, no recreational drugs or alcohol. , no kids.     Past Medical History:   Diagnosis Date    Diabetes mellitus (HCC)     Hyperlipidemia     Hypertension        Past Surgical History:   Procedure Laterality Date    APPENDECTOMY      COLONOSCOPY      EYE SURGERY Left     lasik    FOOT DEBRIDEMENT Right 12/29/2023    FOOT DEBRIDEMENT TO BONE WITH BONE BIOPSY  INCISION AND DRAINAGE RIGHT FOOT performed by [place] DPM at [facility]

## 2025-05-06 NOTE — PATIENT INSTRUCTIONS
- In regards to your neuropathy, will decrease your gabapentin to 600 mg tablets 1 tablet at night.  Prescription has been sent to your pharmacy  - Continue to check your feet regularly for any wounds.  Always wear shoes including at home to protect your feet and avoid any further wounds or wear and tear    - Follow-up with your heart doctor as scheduled this Friday    - Continue with your glimepiride half a tablet as needed for now.  If you find that you are having hypoglycemic episodes, let me know and we will try another diabetic regimen    - Continue to follow with your nephrologist as scheduled, follow-up with him in 2 months    - Follow-up with me in 3 months or sooner if needed

## 2025-05-09 ENCOUNTER — OFFICE VISIT (OUTPATIENT)
Dept: CARDIOLOGY CLINIC | Age: 70
End: 2025-05-09
Payer: MEDICARE

## 2025-05-09 VITALS
WEIGHT: 198.5 LBS | SYSTOLIC BLOOD PRESSURE: 118 MMHG | BODY MASS INDEX: 28.42 KG/M2 | HEIGHT: 70 IN | OXYGEN SATURATION: 100 % | HEART RATE: 52 BPM | DIASTOLIC BLOOD PRESSURE: 60 MMHG

## 2025-05-09 DIAGNOSIS — I50.20 HEART FAILURE WITH REDUCED EJECTION FRACTION (HCC): ICD-10-CM

## 2025-05-09 DIAGNOSIS — I51.89 LEFT VENTRICULAR DYSFUNCTION WITH REDUCED LEFT VENTRICULAR FUNCTION: ICD-10-CM

## 2025-05-09 DIAGNOSIS — I42.9 CARDIOMYOPATHY, UNSPECIFIED TYPE (HCC): Primary | ICD-10-CM

## 2025-05-09 DIAGNOSIS — I10 ESSENTIAL HYPERTENSION: ICD-10-CM

## 2025-05-09 PROCEDURE — G8417 CALC BMI ABV UP PARAM F/U: HCPCS | Performed by: INTERNAL MEDICINE

## 2025-05-09 PROCEDURE — 1123F ACP DISCUSS/DSCN MKR DOCD: CPT | Performed by: INTERNAL MEDICINE

## 2025-05-09 PROCEDURE — 3074F SYST BP LT 130 MM HG: CPT | Performed by: INTERNAL MEDICINE

## 2025-05-09 PROCEDURE — 3078F DIAST BP <80 MM HG: CPT | Performed by: INTERNAL MEDICINE

## 2025-05-09 PROCEDURE — G8427 DOCREV CUR MEDS BY ELIG CLIN: HCPCS | Performed by: INTERNAL MEDICINE

## 2025-05-09 PROCEDURE — 1111F DSCHRG MED/CURRENT MED MERGE: CPT | Performed by: INTERNAL MEDICINE

## 2025-05-09 PROCEDURE — 1036F TOBACCO NON-USER: CPT | Performed by: INTERNAL MEDICINE

## 2025-05-09 PROCEDURE — 3017F COLORECTAL CA SCREEN DOC REV: CPT | Performed by: INTERNAL MEDICINE

## 2025-05-09 PROCEDURE — G2211 COMPLEX E/M VISIT ADD ON: HCPCS | Performed by: INTERNAL MEDICINE

## 2025-05-09 PROCEDURE — 1159F MED LIST DOCD IN RCRD: CPT | Performed by: INTERNAL MEDICINE

## 2025-05-09 PROCEDURE — 99214 OFFICE O/P EST MOD 30 MIN: CPT | Performed by: INTERNAL MEDICINE

## 2025-05-09 RX ORDER — LANOLIN ALCOHOL/MO/W.PET/CERES
1000 CREAM (GRAM) TOPICAL DAILY
COMMUNITY

## 2025-05-09 NOTE — PATIENT INSTRUCTIONS
Your provider has ordered testing for further evaluation.  An order/prescription has been included in your paper work.   To schedule outpatient testing at [facility] contact Central Scheduling by calling [address] [place] [address] ([phone]).  To schedule outpatient testing at [facility] contact [phone].         PLAN:  Continue current cardiac medications aspirin 81, toprol xl 25, crestor 5   Limited echocardiogram in 6-8 weeks to assess LVEF and wall motion  Follow up in 6 months

## 2025-05-30 DIAGNOSIS — N18.9 ANEMIA OF RENAL DISEASE: ICD-10-CM

## 2025-05-30 DIAGNOSIS — D63.1 ANEMIA OF RENAL DISEASE: ICD-10-CM

## 2025-05-30 DIAGNOSIS — E11.43 TYPE 2 DIABETES MELLITUS WITH DIABETIC AUTONOMIC NEUROPATHY, WITHOUT LONG-TERM CURRENT USE OF INSULIN (HCC): ICD-10-CM

## 2025-05-30 RX ORDER — GLIMEPIRIDE 2 MG/1
TABLET ORAL
Qty: 60 TABLET | Refills: 0 | Status: SHIPPED | OUTPATIENT
Start: 2025-05-30

## 2025-05-30 RX ORDER — FOLIC ACID 1 MG/1
1 TABLET ORAL DAILY
Qty: 30 TABLET | Refills: 2 | Status: SHIPPED | OUTPATIENT
Start: 2025-05-30

## 2025-05-30 NOTE — TELEPHONE ENCOUNTER
Refill request for FOLIC ACID medication.     Name of [facility]- [facility]      Last visit - 5-6-25     Pending visit - 8-8-25    Last refill -4-14-25      Medication Contract signed -   Last Oarrs ran-         Additional Comments

## 2025-05-30 NOTE — TELEPHONE ENCOUNTER
Refill request for glimepiride (AMARYL) 2 MG tablet  medication.     [facility]      Last visit - [id number]     Pending visit - [id number]    Last refill -[id number]      Medication Contract signed -  Last Oarrs ran-         Additional Comments

## 2025-06-10 NOTE — PLAN OF CARE
Electromyography (EMG)/Nerve conduction studies (NCS) Report: Upper Extremities    Name: Bob Dozier   : 1979  Date: 2025   Physician: Sánchez Malhotra MD        INDICATIONS: Bob Dozier is a 46 y.o. male who presents for electrodiagnostic evaluation for bilateral arm pain. He is left-handed. Both limbs are necessary to examine in order to evaluate for any evidence of systemic disease as well as establish normal baseline values from which to compare any abnormal unilateral findings. The study is explained and verbal consent to proceed is obtained.     NERVE CONDUCTION STUDIES:  Sensory nerve conduction studies: Left median sensory nerve conduction study to the second digit demonstrates prolonged peak latency and normal amplitude. Right median sensory nerve conduction study to the second digit demonstrates prolonged peak latency and diminished amplitude. Bilateral ulnar sensory nerve conduction studies to the fifth digit demonstrate normal peak latencies and amplitudes, waveform is limited on the left. Bilateral upper limb temperatures are normal.     Motor nerve conduction studies: Bilateral median motor nerve conduction studies with pickup over the abductor pollicis brevis demonstrate normal distal latencies and amplitudes. Bilateral ulnar motor nerve conduction studies with pickup over the abductor digiti minimi demonstrate normal distal latencies, normal amplitudes, and normal conduction velocities in bilateral forearms and across bilateral elbows.    ELECTROMYOGRAPHY: A disposable monopolar needle is used to evaluate bilateral deltoid, biceps, triceps, brachioradialis, extensor indicis proprius, first dorsal interosseous, and opponens pollicis.  The left flexor carpi ulnaris and left flexor digitorum profundus to digits 4 and 5 were also sampled.  All of the muscles sampled are free of any increased insertional activity or any abnormal spontaneous activity. Motor unit recruitment is  Increase function to baseline.

## 2025-06-20 ENCOUNTER — HOSPITAL ENCOUNTER (OUTPATIENT)
Dept: CARDIOLOGY | Age: 70
Discharge: HOME | End: 2025-06-22
Attending: INTERNAL MEDICINE
Payer: MEDICARE

## 2025-06-20 VITALS
DIASTOLIC BLOOD PRESSURE: 88 MMHG | BODY MASS INDEX: 28.35 KG/M2 | SYSTOLIC BLOOD PRESSURE: 138 MMHG | HEIGHT: 70 IN | WEIGHT: 198 LBS

## 2025-06-20 DIAGNOSIS — I42.9 CARDIOMYOPATHY, UNSPECIFIED TYPE (HCC): ICD-10-CM

## 2025-06-20 DIAGNOSIS — I10 ESSENTIAL HYPERTENSION: ICD-10-CM

## 2025-06-20 DIAGNOSIS — I50.20 HEART FAILURE WITH REDUCED EJECTION FRACTION (HCC): ICD-10-CM

## 2025-06-20 LAB
ECHO AO ASC DIAM: 4.2 CM
ECHO AO ASCENDING AORTA INDEX: 2.02 CM/M2
ECHO AO ROOT DIAM: 3.6 CM
ECHO AO ROOT INDEX: 1.73 CM/M2
ECHO AV AREA PEAK VELOCITY: 1.8 CM2
ECHO AV AREA VTI: 1.9 CM2
ECHO AV AREA/BSA PEAK VELOCITY: 0.9 CM2/M2
ECHO AV AREA/BSA VTI: 0.9 CM2/M2
ECHO AV CUSP MM: 2.1 CM
ECHO AV MEAN GRADIENT: 5 MMHG
ECHO AV MEAN VELOCITY: 1 M/S
ECHO AV PEAK GRADIENT: 9 MMHG
ECHO AV PEAK GRADIENT: 9 MMHG
ECHO AV PEAK VELOCITY: 1.5 M/S
ECHO AV VELOCITY RATIO: 0.47
ECHO AV VTI: 35.2 CM
ECHO BSA: 2.11 M2
ECHO EST RA PRESSURE: 3 MMHG
ECHO LA AREA 2C: 27.3 CM2
ECHO LA AREA 4C: 27 CM2
ECHO LA DIAMETER INDEX: 1.97 CM/M2
ECHO LA DIAMETER: 4.1 CM
ECHO LA MAJOR AXIS: 6.9 CM
ECHO LA MINOR AXIS: 6.7 CM
ECHO LA TO AORTIC ROOT RATIO: 1.14
ECHO LA VOL BP: 88 ML (ref 18–58)
ECHO LA VOL MOD A2C: 89 ML (ref 18–58)
ECHO LA VOL MOD A4C: 84 ML (ref 18–58)
ECHO LA VOL/BSA BIPLANE: 42 ML/M2 (ref 16–34)
ECHO LA VOLUME INDEX MOD A2C: 43 ML/M2 (ref 16–34)
ECHO LA VOLUME INDEX MOD A4C: 40 ML/M2 (ref 16–34)
ECHO LV E' LATERAL VELOCITY: 10.8 CM/S
ECHO LV E' SEPTAL VELOCITY: 7.07 CM/S
ECHO LV EDV 3D: 209 ML
ECHO LV EDV INDEX 3D: 100 ML/M2
ECHO LV EJECTION FRACTION 3D: 31 %
ECHO LV ESV 3D: 144 ML
ECHO LV ESV INDEX 3D: 69 ML/M2
ECHO LV FRACTIONAL SHORTENING: 15 % (ref 28–44)
ECHO LV GLOBAL LONGITUDINAL STRAIN (GLS): -11.4 %
ECHO LV GLOBAL LONGITUDINAL STRAIN (GLS): -12.6 %
ECHO LV GLOBAL LONGITUDINAL STRAIN (GLS): -12.9 %
ECHO LV GLOBAL LONGITUDINAL STRAIN (GLS): -13.5 %
ECHO LV INTERNAL DIMENSION DIASTOLE INDEX: 2.55 CM/M2
ECHO LV INTERNAL DIMENSION DIASTOLIC: 5.3 CM (ref 4.2–5.9)
ECHO LV INTERNAL DIMENSION SYSTOLIC INDEX: 2.16 CM/M2
ECHO LV INTERNAL DIMENSION SYSTOLIC: 4.5 CM
ECHO LV ISOVOLUMETRIC RELAXATION TIME (IVRT): 108 MS
ECHO LV IVSD: 1 CM (ref 0.6–1)
ECHO LV MASS 2D: 187.3 G (ref 88–224)
ECHO LV MASS 3D INDEX: 85.6 G/M2
ECHO LV MASS 3D: 178 G
ECHO LV MASS INDEX 2D: 90 G/M2 (ref 49–115)
ECHO LV POSTERIOR WALL DIASTOLIC: 0.9 CM (ref 0.6–1)
ECHO LV RELATIVE WALL THICKNESS RATIO: 0.34
ECHO LVOT AREA: 4.2 CM2
ECHO LVOT AV VTI INDEX: 0.45
ECHO LVOT DIAM: 2.3 CM
ECHO LVOT MEAN GRADIENT: 1 MMHG
ECHO LVOT PEAK GRADIENT: 2 MMHG
ECHO LVOT PEAK VELOCITY: 0.7 M/S
ECHO LVOT STROKE VOLUME INDEX: 31.3 ML/M2
ECHO LVOT SV: 65.2 ML
ECHO LVOT VTI: 15.7 CM
ECHO MV A VELOCITY: 0.48 M/S
ECHO MV E DECELERATION TIME (DT): 206 MS
ECHO MV E VELOCITY: 0.81 M/S
ECHO MV E/A RATIO: 1.69
ECHO MV E/E' LATERAL: 7.5
ECHO MV E/E' RATIO (AVERAGED): 9.48
ECHO MV E/E' SEPTAL: 11.46
ECHO RA AREA 4C: 16 CM2
ECHO RA END SYSTOLIC VOLUME APICAL 4 CHAMBER INDEX BSA: 20 ML/M2
ECHO RA VOLUME: 42 ML
ECHO RIGHT VENTRICULAR SYSTOLIC PRESSURE (RVSP): 26 MMHG
ECHO RV FREE WALL PEAK S': 11.3 CM/S
ECHO RV TAPSE: 2.1 CM (ref 1.7–?)
ECHO TV REGURGITANT MAX VELOCITY: 2.4 M/S
ECHO TV REGURGITANT PEAK GRADIENT: 23 MMHG
ECHO TV REGURGITANT PEAK GRADIENT: 23 MMHG

## 2025-06-20 PROCEDURE — 93306 TTE W/DOPPLER COMPLETE: CPT | Performed by: INTERNAL MEDICINE

## 2025-06-20 PROCEDURE — 93356 MYOCRD STRAIN IMG SPCKL TRCK: CPT | Performed by: INTERNAL MEDICINE

## 2025-06-20 PROCEDURE — 93308 TTE F-UP OR LMTD: CPT

## 2025-06-22 ENCOUNTER — RESULTS FOLLOW-UP (OUTPATIENT)
Dept: CARDIOLOGY | Age: 70
End: 2025-06-22

## 2025-06-25 ENCOUNTER — TELEPHONE (OUTPATIENT)
Dept: CARDIOLOGY CLINIC | Age: 70
End: 2025-06-25

## 2025-06-25 DIAGNOSIS — I42.9 CARDIOMYOPATHY, UNSPECIFIED TYPE (HCC): Primary | ICD-10-CM

## 2025-06-25 NOTE — TELEPHONE ENCOUNTER
Cardiac Cath orders: Left Heart Catheterization and Possible Percutaneous Coronary Intervention with moderate sedation     Ordering Provider: DENIS  Performing Provider: (If not ordering provider)  Length of time for procedure:  IV Sedation: Y  Reps needed:  Specific equip needed: (or NA)  Medications to hold:   - the morning of hold glimepiride, vitamins   - take other meds with sip of water  Pt aware of meds to hold?: Y  Urgent? (Need time frame):

## 2025-07-02 DIAGNOSIS — E11.43 TYPE 2 DIABETES MELLITUS WITH DIABETIC AUTONOMIC NEUROPATHY, WITHOUT LONG-TERM CURRENT USE OF INSULIN (HCC): ICD-10-CM

## 2025-07-02 RX ORDER — GLIMEPIRIDE 2 MG/1
TABLET ORAL
Qty: 60 TABLET | Refills: 0 | Status: SHIPPED | OUTPATIENT
Start: 2025-07-02

## 2025-07-02 NOTE — TELEPHONE ENCOUNTER
Refill request for Glimeperide medication.     Name of [facility]acy- [facility]      Last visit - 05/06/2025     Pending visit - 08/08/2025    Last refill -05/30/2025

## 2025-08-01 DIAGNOSIS — E11.43 TYPE 2 DIABETES MELLITUS WITH DIABETIC AUTONOMIC NEUROPATHY, WITHOUT LONG-TERM CURRENT USE OF INSULIN (HCC): ICD-10-CM

## 2025-08-01 RX ORDER — GLIMEPIRIDE 2 MG/1
TABLET ORAL
Qty: 60 TABLET | Refills: 1 | Status: SHIPPED | OUTPATIENT
Start: 2025-08-01

## 2025-08-01 NOTE — TELEPHONE ENCOUNTER
Refill request for  medication.     GLIMEPIRIDE 2 MG     Name of [facility]- [facility]      Last visit - 05/06/2025     Pending visit - 08/08/2025    Last refill -07/02/2025              Additional Comments

## 2025-08-08 ENCOUNTER — OFFICE VISIT (OUTPATIENT)
Dept: INTERNAL MEDICINE CLINIC | Age: 70
End: 2025-08-08

## 2025-08-08 VITALS
HEIGHT: 69 IN | WEIGHT: 194.6 LBS | TEMPERATURE: 97.3 F | HEART RATE: 76 BPM | DIASTOLIC BLOOD PRESSURE: 87 MMHG | SYSTOLIC BLOOD PRESSURE: 143 MMHG | BODY MASS INDEX: 28.82 KG/M2 | OXYGEN SATURATION: 100 %

## 2025-08-08 DIAGNOSIS — N18.4 STAGE 4 CHRONIC KIDNEY DISEASE (HCC): ICD-10-CM

## 2025-08-08 DIAGNOSIS — Z13.220 LIPID SCREENING: ICD-10-CM

## 2025-08-08 DIAGNOSIS — I50.20 HEART FAILURE WITH REDUCED EJECTION FRACTION (HCC): ICD-10-CM

## 2025-08-08 DIAGNOSIS — E11.42 TYPE 2 DIABETES MELLITUS WITH DIABETIC POLYNEUROPATHY, WITHOUT LONG-TERM CURRENT USE OF INSULIN (HCC): ICD-10-CM

## 2025-08-08 DIAGNOSIS — Z00.00 MEDICARE ANNUAL WELLNESS VISIT, SUBSEQUENT: Primary | ICD-10-CM

## 2025-08-08 LAB
CHOLEST SERPL-MCNC: 126 MG/DL (ref 0–199)
HBA1C MFR BLD: 6.5 %
HDLC SERPL-MCNC: 39 MG/DL (ref 40–60)
LDLC SERPL CALC-MCNC: 72 MG/DL
TRIGL SERPL-MCNC: 76 MG/DL (ref 0–150)
VLDLC SERPL CALC-MCNC: 15 MG/DL

## 2025-08-08 RX ORDER — DAPAGLIFLOZIN 5 MG/1
5 TABLET, FILM COATED ORAL EVERY MORNING
Qty: 30 TABLET | Refills: 2 | Status: SHIPPED | OUTPATIENT
Start: 2025-08-08

## 2025-08-08 SDOH — ECONOMIC STABILITY: FOOD INSECURITY: WITHIN THE PAST 12 MONTHS, THE FOOD YOU BOUGHT JUST DIDN'T LAST AND YOU DIDN'T HAVE MONEY TO GET MORE.: NEVER TRUE

## 2025-08-08 SDOH — ECONOMIC STABILITY: FOOD INSECURITY: WITHIN THE PAST 12 MONTHS, YOU WORRIED THAT YOUR FOOD WOULD RUN OUT BEFORE YOU GOT MONEY TO BUY MORE.: NEVER TRUE

## 2025-08-08 ASSESSMENT — PATIENT HEALTH QUESTIONNAIRE - PHQ9
SUM OF ALL RESPONSES TO PHQ QUESTIONS 1-9: 0
2. FEELING DOWN, DEPRESSED OR HOPELESS: NOT AT ALL
SUM OF ALL RESPONSES TO PHQ QUESTIONS 1-9: 0
1. LITTLE INTEREST OR PLEASURE IN DOING THINGS: NOT AT ALL

## 2025-08-11 ENCOUNTER — TELEPHONE (OUTPATIENT)
Dept: INTERNAL MEDICINE CLINIC | Age: 70
End: 2025-08-11

## 2025-09-05 ENCOUNTER — TELEPHONE (OUTPATIENT)
Dept: INTERNAL MEDICINE CLINIC | Age: 70
End: 2025-09-05

## 2025-09-05 DIAGNOSIS — D63.1 ANEMIA OF RENAL DISEASE: ICD-10-CM

## 2025-09-05 DIAGNOSIS — N18.9 ANEMIA OF RENAL DISEASE: ICD-10-CM

## 2025-09-05 RX ORDER — FOLIC ACID 1 MG/1
1 TABLET ORAL DAILY
Qty: 30 TABLET | Refills: 2 | Status: SHIPPED | OUTPATIENT
Start: 2025-09-05

## (undated) DEVICE — SUTURE MCRYL SZ 4-0 L18IN ABSRB UD L19MM PS-2 3/8 CIR PRIM Y496G

## (undated) DEVICE — Device

## (undated) DEVICE — GLOVE SURG SZ 65 L12IN FNGR THK94MIL STD WHT LTX FREE

## (undated) DEVICE — STERILE PVP: Brand: MEDLINE INDUSTRIES, INC.

## (undated) DEVICE — COVER XR CASS W20XL41IN UNIV ADH STRP

## (undated) DEVICE — SUTURE VCRL + SZ 4-0 L18IN ABSRB UD L19MM PS-2 3/8 CIR PRIM VCP496H

## (undated) DEVICE — WRAP COMPR W3INXL5YD NONSTERILE TAN SELF ADH COBAN

## (undated) DEVICE — SUTURE NONABSORBABLE MONOFILAMENT 4-0 PS-2 18 IN BLK ETHILON 1667G

## (undated) DEVICE — SET ADMIN PRIMING 7ML L30IN 7.35LB 20 GTT 2ND RLER CLMP

## (undated) DEVICE — GLOVE ORANGE PI 7 1/2   MSG9075

## (undated) DEVICE — 1010 S-DRAPE TOWEL DRAPE 10/BX: Brand: STERI-DRAPE™

## (undated) DEVICE — SUTURE ETHLN SZ 3-0 L30IN NONABSORBABLE BLK FSL L30MM 3/8 1671H

## (undated) DEVICE — LOWER EXTREMITY: Brand: MEDLINE INDUSTRIES, INC.

## (undated) DEVICE — STOCKINETTE,IMPERVIOUS,12X48,STERILE: Brand: MEDLINE

## (undated) DEVICE — X-RAY DETECTABLE SPONGES,16 PLY: Brand: VISTEC

## (undated) DEVICE — SET GRAV VENT NVENT CK VLV 3 NDL FREE PRT 10 GTT

## (undated) DEVICE — CATHETER IV 20GA L1.25IN PNK FEP SFTY STR HUB RADPQ DISP

## (undated) DEVICE — GOWN,SIRUS,NON REINFRCD,LARGE,SET IN SL: Brand: MEDLINE

## (undated) DEVICE — BLADE SAW SM W10XL18.5MM THK4MM OSC

## (undated) DEVICE — BASIC SINGLE BASIN 1-LF: Brand: MEDLINE INDUSTRIES, INC.

## (undated) DEVICE — SUTURE PROL SZ 4-0 L18IN NONABSORBABLE BLU L19MM PS-2 3/8 8682G

## (undated) DEVICE — ZIMMER® STERILE DISPOSABLE TOURNIQUET CUFF WITH PLC, DUAL PORT, SINGLE BLADDER, 18 IN. (46 CM)

## (undated) DEVICE — 3M™ TEGADERM™ TRANSPARENT FILM DRESSING FRAME STYLE, 1624W, 2-3/8 IN X 2-3/4 IN (6 CM X 7 CM), 100/CT 4CT/CASE: Brand: 3M™ TEGADERM™

## (undated) DEVICE — BANDAGE GZ W2XL75IN ST RAYON POLY CNFRM STRTCH LTWT

## (undated) DEVICE — SUTURE VCRL + SZ 3-0 L18IN ABSRB UD SH 1/2 CIR TAPERCUT NDL VCP864D

## (undated) DEVICE — NEEDLE HYPO 27GA L1.25IN GRY POLYPR HUB S STL REG BVL STR

## (undated) DEVICE — CHLORAPREP 26ML ORANGE

## (undated) DEVICE — PREMIUM WET SKIN PREP TRAY: Brand: MEDLINE INDUSTRIES, INC.

## (undated) DEVICE — GLOVE ORANGE PI 8   MSG9080

## (undated) DEVICE — SOLUTION IV IRRIG 500ML 0.9% SODIUM CHL 2F7123

## (undated) DEVICE — GLOVE,SURG,SENSICARE,ALOE,LF,PF,7: Brand: MEDLINE

## (undated) DEVICE — BLADE SAW SAG MIC 25.5X9.5X0.6 MM FN TOOTH FOR MICRO100

## (undated) DEVICE — HANDPIECE SET WITH HIGH FLOW TIP AND SUCTION TUBE: Brand: INTERPULSE

## (undated) DEVICE — SOLUTION IV 1000ML LAC RINGERS PH 6.5 INJ USP VIAFLX PLAS

## (undated) DEVICE — SYRINGE, LUER LOCK, 10ML: Brand: MEDLINE